# Patient Record
Sex: MALE | Race: WHITE
[De-identification: names, ages, dates, MRNs, and addresses within clinical notes are randomized per-mention and may not be internally consistent; named-entity substitution may affect disease eponyms.]

---

## 2017-09-22 PROBLEM — Z00.00 ENCOUNTER FOR PREVENTIVE HEALTH EXAMINATION: Status: ACTIVE | Noted: 2017-09-22

## 2017-09-27 ENCOUNTER — APPOINTMENT (OUTPATIENT)
Age: 52
End: 2017-09-27

## 2017-09-27 ENCOUNTER — OUTPATIENT (OUTPATIENT)
Dept: OUTPATIENT SERVICES | Facility: HOSPITAL | Age: 52
LOS: 1 days | Discharge: HOME | End: 2017-09-27

## 2017-09-27 VITALS — DIASTOLIC BLOOD PRESSURE: 96 MMHG | SYSTOLIC BLOOD PRESSURE: 158 MMHG

## 2017-09-27 DIAGNOSIS — N17.9 ACUTE KIDNEY FAILURE, UNSPECIFIED: ICD-10-CM

## 2017-09-27 DIAGNOSIS — C90.00 MULTIPLE MYELOMA NOT HAVING ACHIEVED REMISSION: ICD-10-CM

## 2017-09-27 DIAGNOSIS — M54.9 DORSALGIA, UNSPECIFIED: ICD-10-CM

## 2017-09-27 DIAGNOSIS — Z02.9 ENCOUNTER FOR ADMINISTRATIVE EXAMINATIONS, UNSPECIFIED: ICD-10-CM

## 2017-09-27 RX ORDER — LIDOCAINE 5% 700 MG/1
5 PATCH TOPICAL
Qty: 30 | Refills: 0 | Status: ACTIVE | COMMUNITY
Start: 2017-09-27 | End: 1900-01-01

## 2017-10-13 ENCOUNTER — RX RENEWAL (OUTPATIENT)
Age: 52
End: 2017-10-13

## 2017-10-20 ENCOUNTER — RX RENEWAL (OUTPATIENT)
Age: 52
End: 2017-10-20

## 2017-10-20 DIAGNOSIS — G89.3 NEOPLASM RELATED PAIN (ACUTE) (CHRONIC): ICD-10-CM

## 2017-10-20 RX ORDER — OXYCODONE 5 MG/1
5 TABLET ORAL EVERY 4 HOURS
Qty: 30 | Refills: 0 | Status: ACTIVE | COMMUNITY
Start: 2017-10-20 | End: 1900-01-01

## 2017-10-24 ENCOUNTER — INPATIENT (INPATIENT)
Facility: HOSPITAL | Age: 52
LOS: 10 days | Discharge: HOME | End: 2017-11-04
Attending: INTERNAL MEDICINE

## 2017-10-24 DIAGNOSIS — N17.9 ACUTE KIDNEY FAILURE, UNSPECIFIED: ICD-10-CM

## 2017-10-24 DIAGNOSIS — M54.9 DORSALGIA, UNSPECIFIED: ICD-10-CM

## 2017-11-06 DIAGNOSIS — Z87.891 PERSONAL HISTORY OF NICOTINE DEPENDENCE: ICD-10-CM

## 2017-11-06 DIAGNOSIS — D69.59 OTHER SECONDARY THROMBOCYTOPENIA: ICD-10-CM

## 2017-11-06 DIAGNOSIS — D64.9 ANEMIA, UNSPECIFIED: ICD-10-CM

## 2017-11-06 DIAGNOSIS — E87.5 HYPERKALEMIA: ICD-10-CM

## 2017-11-06 DIAGNOSIS — M84.48XA PATHOLOGICAL FRACTURE, OTHER SITE, INITIAL ENCOUNTER FOR FRACTURE: ICD-10-CM

## 2017-11-06 DIAGNOSIS — C90.00 MULTIPLE MYELOMA NOT HAVING ACHIEVED REMISSION: ICD-10-CM

## 2017-11-06 DIAGNOSIS — E83.52 HYPERCALCEMIA: ICD-10-CM

## 2017-11-06 DIAGNOSIS — N18.4 CHRONIC KIDNEY DISEASE, STAGE 4 (SEVERE): ICD-10-CM

## 2017-11-06 DIAGNOSIS — N05.9 UNSPECIFIED NEPHRITIC SYNDROME WITH UNSPECIFIED MORPHOLOGIC CHANGES: ICD-10-CM

## 2017-11-06 DIAGNOSIS — I12.9 HYPERTENSIVE CHRONIC KIDNEY DISEASE WITH STAGE 1 THROUGH STAGE 4 CHRONIC KIDNEY DISEASE, OR UNSPECIFIED CHRONIC KIDNEY DISEASE: ICD-10-CM

## 2017-11-06 DIAGNOSIS — Z51.5 ENCOUNTER FOR PALLIATIVE CARE: ICD-10-CM

## 2017-11-06 DIAGNOSIS — N17.9 ACUTE KIDNEY FAILURE, UNSPECIFIED: ICD-10-CM

## 2017-11-08 ENCOUNTER — RX RENEWAL (OUTPATIENT)
Age: 52
End: 2017-11-08

## 2017-11-08 ENCOUNTER — CLINICAL ADVICE (OUTPATIENT)
Age: 52
End: 2017-11-08

## 2017-11-09 ENCOUNTER — RX RENEWAL (OUTPATIENT)
Age: 52
End: 2017-11-09

## 2017-11-09 RX ORDER — HYDROMORPHONE HYDROCHLORIDE 2 MG/1
2 TABLET ORAL EVERY 6 HOURS
Qty: 120 | Refills: 0 | Status: ACTIVE | COMMUNITY
Start: 2017-11-08 | End: 1900-01-01

## 2017-11-10 ENCOUNTER — RX RENEWAL (OUTPATIENT)
Age: 52
End: 2017-11-10

## 2017-11-15 DIAGNOSIS — M84.48XA PATHOLOGICAL FRACTURE, OTHER SITE, INITIAL ENCOUNTER FOR FRACTURE: ICD-10-CM

## 2017-11-15 DIAGNOSIS — M48.54XA COLLAPSED VERTEBRA, NOT ELSEWHERE CLASSIFIED, THORACIC REGION, INITIAL ENCOUNTER FOR FRACTURE: ICD-10-CM

## 2017-11-15 DIAGNOSIS — R63.4 ABNORMAL WEIGHT LOSS: ICD-10-CM

## 2017-11-15 DIAGNOSIS — M54.9 DORSALGIA, UNSPECIFIED: ICD-10-CM

## 2017-11-28 ENCOUNTER — INPATIENT (INPATIENT)
Facility: HOSPITAL | Age: 52
LOS: 0 days | Discharge: HOME | End: 2017-11-28
Attending: EMERGENCY MEDICINE

## 2017-11-28 DIAGNOSIS — M54.9 DORSALGIA, UNSPECIFIED: ICD-10-CM

## 2017-11-28 DIAGNOSIS — N17.9 ACUTE KIDNEY FAILURE, UNSPECIFIED: ICD-10-CM

## 2017-12-04 DIAGNOSIS — C90.00 MULTIPLE MYELOMA NOT HAVING ACHIEVED REMISSION: ICD-10-CM

## 2017-12-04 DIAGNOSIS — Z92.21 PERSONAL HISTORY OF ANTINEOPLASTIC CHEMOTHERAPY: ICD-10-CM

## 2017-12-04 DIAGNOSIS — G89.29 OTHER CHRONIC PAIN: ICD-10-CM

## 2017-12-04 DIAGNOSIS — D64.9 ANEMIA, UNSPECIFIED: ICD-10-CM

## 2017-12-04 DIAGNOSIS — I10 ESSENTIAL (PRIMARY) HYPERTENSION: ICD-10-CM

## 2017-12-04 DIAGNOSIS — M54.9 DORSALGIA, UNSPECIFIED: ICD-10-CM

## 2017-12-14 ENCOUNTER — INPATIENT (INPATIENT)
Facility: HOSPITAL | Age: 52
LOS: 14 days | Discharge: HOME HEALTH | End: 2017-12-29
Attending: INTERNAL MEDICINE

## 2017-12-14 DIAGNOSIS — N17.9 ACUTE KIDNEY FAILURE, UNSPECIFIED: ICD-10-CM

## 2017-12-14 DIAGNOSIS — M54.9 DORSALGIA, UNSPECIFIED: ICD-10-CM

## 2018-01-02 DIAGNOSIS — K59.00 CONSTIPATION, UNSPECIFIED: ICD-10-CM

## 2018-01-02 DIAGNOSIS — N28.9 DISORDER OF KIDNEY AND URETER, UNSPECIFIED: ICD-10-CM

## 2018-01-02 DIAGNOSIS — R64 CACHEXIA: ICD-10-CM

## 2018-01-02 DIAGNOSIS — D64.9 ANEMIA, UNSPECIFIED: ICD-10-CM

## 2018-01-02 DIAGNOSIS — M48.56XA COLLAPSED VERTEBRA, NOT ELSEWHERE CLASSIFIED, LUMBAR REGION, INITIAL ENCOUNTER FOR FRACTURE: ICD-10-CM

## 2018-01-02 DIAGNOSIS — E87.1 HYPO-OSMOLALITY AND HYPONATREMIA: ICD-10-CM

## 2018-01-02 DIAGNOSIS — G89.3 NEOPLASM RELATED PAIN (ACUTE) (CHRONIC): ICD-10-CM

## 2018-01-02 DIAGNOSIS — N18.3 CHRONIC KIDNEY DISEASE, STAGE 3 (MODERATE): ICD-10-CM

## 2018-01-02 DIAGNOSIS — C90.00 MULTIPLE MYELOMA NOT HAVING ACHIEVED REMISSION: ICD-10-CM

## 2018-01-02 DIAGNOSIS — I45.81 LONG QT SYNDROME: ICD-10-CM

## 2018-01-02 DIAGNOSIS — E87.2 ACIDOSIS: ICD-10-CM

## 2018-01-02 DIAGNOSIS — M62.830 MUSCLE SPASM OF BACK: ICD-10-CM

## 2018-01-02 DIAGNOSIS — C79.00 SECONDARY MALIGNANT NEOPLASM OF UNSPECIFIED KIDNEY AND RENAL PELVIS: ICD-10-CM

## 2018-01-02 DIAGNOSIS — N18.4 CHRONIC KIDNEY DISEASE, STAGE 4 (SEVERE): ICD-10-CM

## 2018-01-02 DIAGNOSIS — E86.0 DEHYDRATION: ICD-10-CM

## 2018-01-02 DIAGNOSIS — T45.1X5A ADVERSE EFFECT OF ANTINEOPLASTIC AND IMMUNOSUPPRESSIVE DRUGS, INITIAL ENCOUNTER: ICD-10-CM

## 2018-01-02 DIAGNOSIS — D64.81 ANEMIA DUE TO ANTINEOPLASTIC CHEMOTHERAPY: ICD-10-CM

## 2018-01-02 DIAGNOSIS — I12.9 HYPERTENSIVE CHRONIC KIDNEY DISEASE WITH STAGE 1 THROUGH STAGE 4 CHRONIC KIDNEY DISEASE, OR UNSPECIFIED CHRONIC KIDNEY DISEASE: ICD-10-CM

## 2018-01-02 DIAGNOSIS — K21.9 GASTRO-ESOPHAGEAL REFLUX DISEASE WITHOUT ESOPHAGITIS: ICD-10-CM

## 2018-01-02 DIAGNOSIS — E83.51 HYPOCALCEMIA: ICD-10-CM

## 2018-01-02 DIAGNOSIS — D63.0 ANEMIA IN NEOPLASTIC DISEASE: ICD-10-CM

## 2018-01-02 DIAGNOSIS — N17.9 ACUTE KIDNEY FAILURE, UNSPECIFIED: ICD-10-CM

## 2018-01-02 DIAGNOSIS — Z51.5 ENCOUNTER FOR PALLIATIVE CARE: ICD-10-CM

## 2018-01-04 DIAGNOSIS — E83.51 HYPOCALCEMIA: ICD-10-CM

## 2018-01-04 DIAGNOSIS — M48.56XA COLLAPSED VERTEBRA, NOT ELSEWHERE CLASSIFIED, LUMBAR REGION, INITIAL ENCOUNTER FOR FRACTURE: ICD-10-CM

## 2018-01-09 ENCOUNTER — OUTPATIENT (OUTPATIENT)
Dept: OUTPATIENT SERVICES | Facility: HOSPITAL | Age: 53
LOS: 1 days | Discharge: HOME | End: 2018-01-09

## 2018-01-09 DIAGNOSIS — C90.02 MULTIPLE MYELOMA IN RELAPSE: ICD-10-CM

## 2018-01-24 ENCOUNTER — INPATIENT (INPATIENT)
Facility: HOSPITAL | Age: 53
LOS: 5 days | Discharge: HOME HEALTH | End: 2018-01-30
Attending: INTERNAL MEDICINE

## 2018-02-02 DIAGNOSIS — N17.9 ACUTE KIDNEY FAILURE, UNSPECIFIED: ICD-10-CM

## 2018-02-02 DIAGNOSIS — G21.9 SECONDARY PARKINSONISM, UNSPECIFIED: ICD-10-CM

## 2018-02-02 DIAGNOSIS — I12.9 HYPERTENSIVE CHRONIC KIDNEY DISEASE WITH STAGE 1 THROUGH STAGE 4 CHRONIC KIDNEY DISEASE, OR UNSPECIFIED CHRONIC KIDNEY DISEASE: ICD-10-CM

## 2018-02-02 DIAGNOSIS — N18.4 CHRONIC KIDNEY DISEASE, STAGE 4 (SEVERE): ICD-10-CM

## 2018-02-02 DIAGNOSIS — M48.54XA COLLAPSED VERTEBRA, NOT ELSEWHERE CLASSIFIED, THORACIC REGION, INITIAL ENCOUNTER FOR FRACTURE: ICD-10-CM

## 2018-02-02 DIAGNOSIS — G89.29 OTHER CHRONIC PAIN: ICD-10-CM

## 2018-02-02 DIAGNOSIS — C90.00 MULTIPLE MYELOMA NOT HAVING ACHIEVED REMISSION: ICD-10-CM

## 2018-02-02 DIAGNOSIS — K21.9 GASTRO-ESOPHAGEAL REFLUX DISEASE WITHOUT ESOPHAGITIS: ICD-10-CM

## 2018-02-02 DIAGNOSIS — M84.48XA PATHOLOGICAL FRACTURE, OTHER SITE, INITIAL ENCOUNTER FOR FRACTURE: ICD-10-CM

## 2018-02-02 DIAGNOSIS — M48.56XD COLLAPSED VERTEBRA, NOT ELSEWHERE CLASSIFIED, LUMBAR REGION, SUBSEQUENT ENCOUNTER FOR FRACTURE WITH ROUTINE HEALING: ICD-10-CM

## 2018-02-02 DIAGNOSIS — D63.8 ANEMIA IN OTHER CHRONIC DISEASES CLASSIFIED ELSEWHERE: ICD-10-CM

## 2018-02-02 DIAGNOSIS — K59.00 CONSTIPATION, UNSPECIFIED: ICD-10-CM

## 2018-02-04 DIAGNOSIS — N17.9 ACUTE KIDNEY FAILURE, UNSPECIFIED: ICD-10-CM

## 2018-02-04 DIAGNOSIS — S22.49XA MULTIPLE FRACTURES OF RIBS, UNSPECIFIED SIDE, INITIAL ENCOUNTER FOR CLOSED FRACTURE: ICD-10-CM

## 2018-02-04 DIAGNOSIS — M54.9 DORSALGIA, UNSPECIFIED: ICD-10-CM

## 2018-02-05 ENCOUNTER — RX RENEWAL (OUTPATIENT)
Age: 53
End: 2018-02-05

## 2018-02-05 RX ORDER — FENTANYL 50 UG/H
50 PATCH, EXTENDED RELEASE TRANSDERMAL
Qty: 10 | Refills: 0 | Status: ACTIVE | COMMUNITY
Start: 2018-02-05 | End: 1900-01-01

## 2018-02-13 DIAGNOSIS — E43 UNSPECIFIED SEVERE PROTEIN-CALORIE MALNUTRITION: ICD-10-CM

## 2018-03-14 ENCOUNTER — EMERGENCY (EMERGENCY)
Facility: HOSPITAL | Age: 53
LOS: 0 days | Discharge: HOME | End: 2018-03-14
Attending: EMERGENCY MEDICINE

## 2018-03-14 VITALS
RESPIRATION RATE: 18 BRPM | DIASTOLIC BLOOD PRESSURE: 63 MMHG | SYSTOLIC BLOOD PRESSURE: 140 MMHG | TEMPERATURE: 96 F | OXYGEN SATURATION: 96 % | HEART RATE: 129 BPM

## 2018-03-14 VITALS
DIASTOLIC BLOOD PRESSURE: 72 MMHG | TEMPERATURE: 97 F | SYSTOLIC BLOOD PRESSURE: 139 MMHG | HEART RATE: 80 BPM | OXYGEN SATURATION: 98 % | RESPIRATION RATE: 18 BRPM

## 2018-03-14 DIAGNOSIS — C90.00 MULTIPLE MYELOMA NOT HAVING ACHIEVED REMISSION: ICD-10-CM

## 2018-03-14 DIAGNOSIS — N18.9 CHRONIC KIDNEY DISEASE, UNSPECIFIED: ICD-10-CM

## 2018-03-14 LAB
ALBUMIN SERPL ELPH-MCNC: 3.6 G/DL — SIGNIFICANT CHANGE UP (ref 3–5.5)
ALP SERPL-CCNC: 50 U/L — SIGNIFICANT CHANGE UP (ref 30–115)
ALT FLD-CCNC: 16 U/L — SIGNIFICANT CHANGE UP (ref 0–41)
ANION GAP SERPL CALC-SCNC: 5 MMOL/L — LOW (ref 7–14)
AST SERPL-CCNC: 15 U/L — SIGNIFICANT CHANGE UP (ref 0–41)
BASOPHILS # BLD AUTO: 0.01 K/UL — SIGNIFICANT CHANGE UP (ref 0–0.2)
BASOPHILS NFR BLD AUTO: 0.2 % — SIGNIFICANT CHANGE UP (ref 0–1)
BILIRUB SERPL-MCNC: 0.6 MG/DL — SIGNIFICANT CHANGE UP (ref 0.2–1.2)
BLD GP AB SCN SERPL QL: SIGNIFICANT CHANGE UP
BUN SERPL-MCNC: 27 MG/DL — HIGH (ref 10–20)
CALCIUM SERPL-MCNC: 8.7 MG/DL — SIGNIFICANT CHANGE UP (ref 8.5–10.1)
CHLORIDE SERPL-SCNC: 109 MMOL/L — SIGNIFICANT CHANGE UP (ref 98–110)
CO2 SERPL-SCNC: 21 MMOL/L — SIGNIFICANT CHANGE UP (ref 17–32)
CREAT SERPL-MCNC: 2.4 MG/DL — HIGH (ref 0.7–1.5)
EOSINOPHIL # BLD AUTO: 0.41 K/UL — SIGNIFICANT CHANGE UP (ref 0–0.7)
EOSINOPHIL NFR BLD AUTO: 7.3 % — SIGNIFICANT CHANGE UP (ref 0–8)
GLUCOSE SERPL-MCNC: 82 MG/DL — SIGNIFICANT CHANGE UP (ref 70–110)
HCT VFR BLD CALC: 21.9 % — LOW (ref 42–52)
HGB BLD-MCNC: 7.3 G/DL — CRITICAL LOW (ref 14–18)
IMM GRANULOCYTES NFR BLD AUTO: 2.5 % — HIGH (ref 0.1–0.3)
LYMPHOCYTES # BLD AUTO: 1.03 K/UL — LOW (ref 1.2–3.4)
LYMPHOCYTES # BLD AUTO: 18.3 % — LOW (ref 20.5–51.1)
MCHC RBC-ENTMCNC: 32.4 PG — HIGH (ref 27–31)
MCHC RBC-ENTMCNC: 33.3 G/DL — SIGNIFICANT CHANGE UP (ref 32–37)
MCV RBC AUTO: 97.3 FL — HIGH (ref 80–94)
MONOCYTES # BLD AUTO: 0.77 K/UL — HIGH (ref 0.1–0.6)
MONOCYTES NFR BLD AUTO: 13.7 % — HIGH (ref 1.7–9.3)
NEUTROPHILS # BLD AUTO: 3.26 K/UL — SIGNIFICANT CHANGE UP (ref 1.4–6.5)
NEUTROPHILS NFR BLD AUTO: 58 % — SIGNIFICANT CHANGE UP (ref 42.2–75.2)
NRBC # BLD: 0 /100 WBCS — SIGNIFICANT CHANGE UP (ref 0–0)
PLATELET # BLD AUTO: 228 K/UL — SIGNIFICANT CHANGE UP (ref 130–400)
POTASSIUM SERPL-MCNC: 4.6 MMOL/L — SIGNIFICANT CHANGE UP (ref 3.5–5)
POTASSIUM SERPL-SCNC: 4.6 MMOL/L — SIGNIFICANT CHANGE UP (ref 3.5–5)
PROT SERPL-MCNC: 6 G/DL — SIGNIFICANT CHANGE UP (ref 6–8)
RBC # BLD: 2.25 M/UL — LOW (ref 4.7–6.1)
RBC # FLD: 14.8 % — HIGH (ref 11.5–14.5)
SODIUM SERPL-SCNC: 135 MMOL/L — SIGNIFICANT CHANGE UP (ref 135–146)
TYPE + AB SCN PNL BLD: SIGNIFICANT CHANGE UP
WBC # BLD: 5.62 K/UL — SIGNIFICANT CHANGE UP (ref 4.8–10.8)
WBC # FLD AUTO: 5.62 K/UL — SIGNIFICANT CHANGE UP (ref 4.8–10.8)

## 2018-03-14 NOTE — ED PROVIDER NOTE - PROGRESS NOTE DETAILS
Attending note:  51 y/o M HX of multiple myeloma gets chemo every Monday with Dr. Valadez, chronic kidney disease, sent in for transfusion. Pt received results today from bloodwork he had done this past Monday with Dr. Valadez showing Hgb of 7.5. Pt states that his baseline Hgb is over 8 and his last transfusion was in January. Pt is otherwise well and denies fever, chills, pain, bloody stools.   On exam: Pt is pale-appearing, nontoxic, S1S2, CTAB, abdomen soft NTND, neuro grossly intact.  Plan: Will transfuse. s/o Dr. Partida

## 2018-03-14 NOTE — ED CDU PROVIDER INITIAL DAY NOTE - CONSTITUTIONAL, MLM
normal... + Pale Apearance. Awake, alert, oriented to person, place, time/situation and in no apparent distress.

## 2018-03-14 NOTE — ED PROVIDER NOTE - OBJECTIVE STATEMENT
51 y/o M, PMHx Multiple Myeloma - currently undergoing chemo weekly (last was on Monday 3/12, & CKD, presents to the ED at the request of his oncologist, Dr. Valadez, as recent bloodwork revealed a Hgb of 7.5. Patient admits to a prior hx of requiring blood transfusions. He denies chest pain, dyspnea, weakness, abdominal pain, nausea, vomiting, and black/bloody stool. He denies any blood thinner use.

## 2018-03-14 NOTE — ED CDU PROVIDER INITIAL DAY NOTE - ATTENDING CONTRIBUTION TO CARE
Pt placed into observation for transfusion of red blood cells. No active bleeding. anemia is secondary to chemo .

## 2018-03-14 NOTE — ED CDU PROVIDER INITIAL DAY NOTE - OBJECTIVE STATEMENT
53 y/o male with PMHX of Multiple Myeloma  on chemo, CKD presenting for blood transfusion. Heme/Onc - Dr Valadez. Pt sent from Dr Valadez's office for one unit of PRBC. Outpatient hgb 7.5, in ED hgb 7.3. Pt was last transfused January 2018. Pt denies any symptoms at this time.

## 2018-03-14 NOTE — ED CDU PROVIDER DISPOSITION NOTE - CLINICAL COURSE
Placed into observation for anemia secondary to chemo requiring transfusion. Pt transfused one unit of prbc and felt better. Pt will follow up with his oncologist this week.

## 2018-03-14 NOTE — ED CDU PROVIDER INITIAL DAY NOTE - MEDICAL DECISION MAKING DETAILS
Pt has a history of Multiple myeloma, under the care of Dr. Valadez, chemo once a week since October 2017, presents with anemia requiring transfusion. No complaints of shortness of breath. Only feels week. On exam S1s2 rrr, lungs clear, abdomen is soft nontender. ext neg for edema.

## 2020-07-21 ENCOUNTER — TRANSCRIPTION ENCOUNTER (OUTPATIENT)
Age: 55
End: 2020-07-21

## 2020-09-25 PROBLEM — C90.00 MULTIPLE MYELOMA NOT HAVING ACHIEVED REMISSION: Chronic | Status: ACTIVE | Noted: 2018-03-14

## 2020-10-19 ENCOUNTER — OUTPATIENT (OUTPATIENT)
Dept: OUTPATIENT SERVICES | Facility: HOSPITAL | Age: 55
LOS: 1 days | Discharge: HOME | End: 2020-10-19

## 2020-10-19 DIAGNOSIS — G56.03 CARPAL TUNNEL SYNDROME, BILATERAL UPPER LIMBS: ICD-10-CM

## 2020-10-19 DIAGNOSIS — M25.649 STIFFNESS OF UNSPECIFIED HAND, NOT ELSEWHERE CLASSIFIED: ICD-10-CM

## 2021-02-15 ENCOUNTER — EMERGENCY (EMERGENCY)
Facility: HOSPITAL | Age: 56
LOS: 0 days | Discharge: HOME | End: 2021-02-15
Attending: EMERGENCY MEDICINE | Admitting: EMERGENCY MEDICINE
Payer: COMMERCIAL

## 2021-02-15 VITALS
TEMPERATURE: 98 F | WEIGHT: 134.92 LBS | OXYGEN SATURATION: 100 % | HEIGHT: 71 IN | HEART RATE: 138 BPM | DIASTOLIC BLOOD PRESSURE: 68 MMHG | RESPIRATION RATE: 17 BRPM | SYSTOLIC BLOOD PRESSURE: 147 MMHG

## 2021-02-15 VITALS
HEART RATE: 98 BPM | OXYGEN SATURATION: 100 % | SYSTOLIC BLOOD PRESSURE: 146 MMHG | DIASTOLIC BLOOD PRESSURE: 81 MMHG | TEMPERATURE: 97 F | RESPIRATION RATE: 20 BRPM

## 2021-02-15 DIAGNOSIS — R79.9 ABNORMAL FINDING OF BLOOD CHEMISTRY, UNSPECIFIED: ICD-10-CM

## 2021-02-15 DIAGNOSIS — R06.02 SHORTNESS OF BREATH: ICD-10-CM

## 2021-02-15 DIAGNOSIS — D64.9 ANEMIA, UNSPECIFIED: ICD-10-CM

## 2021-02-15 LAB
ALBUMIN SERPL ELPH-MCNC: 3.2 G/DL — LOW (ref 3.5–5.2)
ALP SERPL-CCNC: 63 U/L — SIGNIFICANT CHANGE UP (ref 30–115)
ALT FLD-CCNC: 15 U/L — SIGNIFICANT CHANGE UP (ref 0–41)
ANION GAP SERPL CALC-SCNC: 10 MMOL/L — SIGNIFICANT CHANGE UP (ref 7–14)
ANISOCYTOSIS BLD QL: SLIGHT — SIGNIFICANT CHANGE UP
AST SERPL-CCNC: 40 U/L — SIGNIFICANT CHANGE UP (ref 0–41)
BASE EXCESS BLDV CALC-SCNC: -2 MMOL/L — SIGNIFICANT CHANGE UP (ref -2–2)
BASOPHILS # BLD AUTO: 0 K/UL — SIGNIFICANT CHANGE UP (ref 0–0.2)
BASOPHILS NFR BLD AUTO: 0 % — SIGNIFICANT CHANGE UP (ref 0–1)
BILIRUB SERPL-MCNC: 0.3 MG/DL — SIGNIFICANT CHANGE UP (ref 0.2–1.2)
BLD GP AB SCN SERPL QL: SIGNIFICANT CHANGE UP
BUN SERPL-MCNC: 19 MG/DL — SIGNIFICANT CHANGE UP (ref 10–20)
CA-I SERPL-SCNC: 1.15 MMOL/L — SIGNIFICANT CHANGE UP (ref 1.12–1.3)
CALCIUM SERPL-MCNC: 8.2 MG/DL — LOW (ref 8.5–10.1)
CHLORIDE SERPL-SCNC: 101 MMOL/L — SIGNIFICANT CHANGE UP (ref 98–110)
CO2 SERPL-SCNC: 20 MMOL/L — SIGNIFICANT CHANGE UP (ref 17–32)
CREAT SERPL-MCNC: 2 MG/DL — HIGH (ref 0.7–1.5)
DACRYOCYTES BLD QL SMEAR: SLIGHT — SIGNIFICANT CHANGE UP
EOSINOPHIL # BLD AUTO: 0.2 K/UL — SIGNIFICANT CHANGE UP (ref 0–0.7)
EOSINOPHIL NFR BLD AUTO: 3.6 % — SIGNIFICANT CHANGE UP (ref 0–8)
GAS PNL BLDV: 136 MMOL/L — SIGNIFICANT CHANGE UP (ref 136–145)
GAS PNL BLDV: SIGNIFICANT CHANGE UP
GIANT PLATELETS BLD QL SMEAR: PRESENT — SIGNIFICANT CHANGE UP
GLUCOSE SERPL-MCNC: 118 MG/DL — HIGH (ref 70–99)
HCO3 BLDV-SCNC: 23 MMOL/L — SIGNIFICANT CHANGE UP (ref 22–29)
HCT VFR BLD CALC: 20.7 % — LOW (ref 42–52)
HCT VFR BLDA CALC: 17.9 % — LOW (ref 34–44)
HGB BLD CALC-MCNC: 5.8 G/DL — LOW (ref 14–18)
HGB BLD-MCNC: 6.9 G/DL — CRITICAL LOW (ref 14–18)
LACTATE BLDV-MCNC: 0.5 MMOL/L — SIGNIFICANT CHANGE UP (ref 0.5–1.6)
LYMPHOCYTES # BLD AUTO: 1.72 K/UL — SIGNIFICANT CHANGE UP (ref 1.2–3.4)
LYMPHOCYTES # BLD AUTO: 30.3 % — SIGNIFICANT CHANGE UP (ref 20.5–51.1)
MANUAL SMEAR VERIFICATION: SIGNIFICANT CHANGE UP
MCHC RBC-ENTMCNC: 31.9 PG — HIGH (ref 27–31)
MCHC RBC-ENTMCNC: 33.3 G/DL — SIGNIFICANT CHANGE UP (ref 32–37)
MCV RBC AUTO: 95.8 FL — HIGH (ref 80–94)
METAMYELOCYTES # FLD: 3.6 % — HIGH (ref 0–0)
MICROCYTES BLD QL: SLIGHT — SIGNIFICANT CHANGE UP
MONOCYTES # BLD AUTO: 0.35 K/UL — SIGNIFICANT CHANGE UP (ref 0.1–0.6)
MONOCYTES NFR BLD AUTO: 6.2 % — SIGNIFICANT CHANGE UP (ref 1.7–9.3)
MYELOCYTES NFR BLD: 1.8 % — HIGH (ref 0–0)
NEUTROPHILS # BLD AUTO: 3.05 K/UL — SIGNIFICANT CHANGE UP (ref 1.4–6.5)
NEUTROPHILS NFR BLD AUTO: 52.7 % — SIGNIFICANT CHANGE UP (ref 42.2–75.2)
NEUTS BAND # BLD: 0.9 % — SIGNIFICANT CHANGE UP (ref 0–6)
NRBC # BLD: 1 /100 — HIGH (ref 0–0)
NRBC # BLD: SIGNIFICANT CHANGE UP /100 WBCS (ref 0–0)
PCO2 BLDV: 36 MMHG — LOW (ref 41–51)
PH BLDV: 7.4 — SIGNIFICANT CHANGE UP (ref 7.26–7.43)
PLAT MORPH BLD: ABNORMAL
PLATELET # BLD AUTO: 372 K/UL — SIGNIFICANT CHANGE UP (ref 130–400)
PO2 BLDV: 25 MMHG — SIGNIFICANT CHANGE UP (ref 20–40)
POIKILOCYTOSIS BLD QL AUTO: SLIGHT — SIGNIFICANT CHANGE UP
POLYCHROMASIA BLD QL SMEAR: SLIGHT — SIGNIFICANT CHANGE UP
POTASSIUM BLDV-SCNC: 4.2 MMOL/L — SIGNIFICANT CHANGE UP (ref 3.3–5.6)
POTASSIUM SERPL-MCNC: 6.9 MMOL/L — CRITICAL HIGH (ref 3.5–5)
POTASSIUM SERPL-SCNC: 6.9 MMOL/L — CRITICAL HIGH (ref 3.5–5)
PROT SERPL-MCNC: 7.2 G/DL — SIGNIFICANT CHANGE UP (ref 6–8)
RBC # BLD: 2.16 M/UL — LOW (ref 4.7–6.1)
RBC # FLD: 15.2 % — HIGH (ref 11.5–14.5)
RBC BLD AUTO: ABNORMAL
SAO2 % BLDV: 47 % — SIGNIFICANT CHANGE UP
SODIUM SERPL-SCNC: 131 MMOL/L — LOW (ref 135–146)
TOXIC GRANULES BLD QL SMEAR: PRESENT — SIGNIFICANT CHANGE UP
VARIANT LYMPHS # BLD: 0.9 % — SIGNIFICANT CHANGE UP (ref 0–5)
WBC # BLD: 5.69 K/UL — SIGNIFICANT CHANGE UP (ref 4.8–10.8)
WBC # FLD AUTO: 5.69 K/UL — SIGNIFICANT CHANGE UP (ref 4.8–10.8)

## 2021-02-15 PROCEDURE — 99218: CPT

## 2021-02-15 PROCEDURE — 93010 ELECTROCARDIOGRAM REPORT: CPT

## 2021-02-15 NOTE — ED PROVIDER NOTE - CLINICAL SUMMARY MEDICAL DECISION MAKING FREE TEXT BOX
Patient presented for low hb as outpatient. Otherwise on arrival afebrile, HD stable. Obtained labs which showed Hb to 6.9 but otherwise grossly unremarkable including no significant leukocytosis, signs of dehydration/MAC, transaminitis or significant electrolyte abnormalities. Consented for blood transfusion. Consulted Dr. Valadez from heme/onc who agrees with 1U PRBC transfusion and discharge with outpatient follow up. Will place in obs for transfusion and monitoring. Patient agreeable with plan.

## 2021-02-15 NOTE — ED PROVIDER NOTE - NS ED ROS FT
Review of Systems:  	•	CONSTITUTIONAL: no fever, no diaphoresis, no chills  	•	SKIN: no rash  	•	HEMATOLOGIC: no bleeding  	•	ENT: no sore throat  	•	RESPIRATORY: +shortness of breath, no cough  	•	CARDIAC: no chest pain, no palpitations  	•	GI: no abd pain, no nausea, no vomiting, no diarrhea  	•	GENITO-URINARY: no discharge, no dysuria; no hematuria, no increased urinary frequency  	•	MUSCULOSKELETAL: no joint paint, no swelling, no redness  	•	NEUROLOGIC: no weakness, no headache, no paresthesias

## 2021-02-15 NOTE — ED CDU PROVIDER INITIAL DAY NOTE - ATTENDING CONTRIBUTION TO CARE
56 y/o m w/ pmhx of CKD, mulitple myeloma s/p chemo ~ 3 times a month, requires blood transfusions at times presents for blood transfusion. symptoms of shortness of breath, worse with exertion. last transfusion 2018. No fever, chills, n/v, cp,  pleuritic cp, palpitations, diaphoresis, cough, ha/lh/dizziness, numbness/tingling, neck pain/ stiffness, abd pain, diarrhea, constipation, melena/brbpr, urinary symptoms, trauma, edema, calf pain/swelling/erythema, sick contacts, recent travel or rash.    Vital Signs: I have reviewed the initial vital signs. Constitutional: Male pt sitting on stretcher speaking full sentences. EYES: (+) Pale conjunctivae. Integumentary: No rash. (+) Pallor. ENT: MMM NECK: Supple, non-tender, no meningeal signs. Cardiovascular: RRR, radial pulses 2/4 b/l. No JVD. Respiratory: BS present b/l, ctabl, no wheezing or crackles, no accessory muscle use, no stridor. Gastrointestinal: BS present throughout all 4 quadrants, soft, nd, nt, no rebound tenderness or guarding, no cvat. Musculoskeletal: FROM, no edema, no calf pain/swelling/erythema. Neurologic: AAOx3, motor 5/5 and sensation intact throughout upper and lowe ext, CN II-XII intact, No facial droop or slurring of speech. No focal deficits.    Hgb in ed 6.9, consented for blood, witnessed, given to  to scan, k 6.9 hemolyzed, on vbg 4.2, cre baseline at 2.0, previous 2.4 in 2018, pt aware of plan for transfusion, will continue to monitor and reassess.

## 2021-02-15 NOTE — ED CDU PROVIDER DISPOSITION NOTE - NSFOLLOWUPINSTRUCTIONS_ED_ALL_ED_FT
Anemia  Anemia is a condition in which you do not have enough red blood cells or hemoglobin. Hemoglobin is a substance in red blood cells that carries oxygen. When you do not have enough red blood cells or hemoglobin (are anemic), your body cannot get enough oxygen and your organs may not work properly. As a result, you may feel very tired or have other problems.    What are the causes?  Common causes of anemia include:  Excessive bleeding. Anemia can be caused by excessive bleeding inside or outside the body, including bleeding from the intestine or from periods in women.  Poor nutrition.  Long-lasting (chronic) kidney, thyroid, and liver disease.  Bone marrow disorders.  Cancer and treatments for cancer.  HIV (human immunodeficiency virus) and AIDS (acquired immunodeficiency syndrome).  Treatments for HIV and AIDS.  Spleen problems.  Blood disorders.  Infections, medicines, and autoimmune disorders that destroy red blood cells.  What are the signs or symptoms?  Symptoms of this condition include:  Minor weakness.  Dizziness.  Headache.  Feeling heartbeats that are irregular or faster than normal (palpitations).  Shortness of breath, especially with exercise.  Paleness.  Cold sensitivity.  Indigestion.  Nausea.  Difficulty sleeping.  Difficulty concentrating.  Symptoms may occur suddenly or develop slowly. If your anemia is mild, you may not have symptoms.    How is this diagnosed?  This condition is diagnosed based on:  Blood tests.  Your medical history.  A physical exam.  Bone marrow biopsy.  Your health care provider may also check your stool (feces) for blood and may do additional testing to look for the cause of your bleeding.    You may also have other tests, including:  Imaging tests, such as a CT scan or MRI.  Endoscopy.  Colonoscopy.  How is this treated?  Treatment for this condition depends on the cause. If you continue to lose a lot of blood, you may need to be treated at a hospital. Treatment may include:  Taking supplements of iron, vitamin B12, or folic acid.  Taking a hormone medicine (erythropoietin) that can help to stimulate red blood cell growth.  Having a blood transfusion. This may be needed if you lose a lot of blood.  Making changes to your diet.  Having surgery to remove your spleen.  Follow these instructions at home:  Take over-the-counter and prescription medicines only as told by your health care provider.  Take supplements only as told by your health care provider.  Follow any diet instructions that you were given.  Keep all follow-up visits as told by your health care provider. This is important.  Contact a health care provider if:  You develop new bleeding anywhere in the body.  Get help right away if:  You are very weak.  You are short of breath.  You have pain in your abdomen or chest.  You are dizzy or feel faint.  You have trouble concentrating.  You have bloody or black, tarry stools.  You vomit repeatedly or you vomit up blood.  Summary  Anemia is a condition in which you do not have enough red blood cells or enough of a substance in your red blood cells that carries oxygen (hemoglobin).  Symptoms may occur suddenly or develop slowly.  If your anemia is mild, you may not have symptoms.  This condition is diagnosed with blood tests as well as a medical history and physical exam. Other tests may be needed.  Treatment for this condition depends on the cause of the anemia.  This information is not intended to replace advice given to you by your health care provider. Make sure you discuss any questions you have with your health care provider.

## 2021-02-15 NOTE — ED CDU PROVIDER DISPOSITION NOTE - CARE PROVIDER_API CALL
Bela [facility]  HEMATOLOGY  [id number]  [place] [address]  Phone: [phone]  Fax: [phone]  Follow Up Time:

## 2021-02-15 NOTE — ED CDU PROVIDER INITIAL DAY NOTE - OBJECTIVE STATEMENT
Pt is a 56y/o male with a pmhx of Multiple Myeloma presents today for generalized weakness, anemia, requires frequent transfusions. Pt follwoed by Dr. Valadez and has upcoming chemo in 3 days. Pt denies CP, N/V/D, fever, chills.

## 2021-02-15 NOTE — ED CDU PROVIDER DISPOSITION NOTE - ATTENDING CONTRIBUTION TO CARE
54 y/o m w/ pmhx of CKD, mulitple myeloma s/p chemo ~ 3 times a month, requires blood transfusions at times presents for blood transfusion. symptoms of shortness of breath, worse with exertion. last transfusion 2018. No fever, chills, n/v, cp,  pleuritic cp, palpitations, diaphoresis, cough, ha/lh/dizziness, numbness/tingling, neck pain/ stiffness, abd pain, diarrhea, constipation, melena/brbpr, urinary symptoms, trauma, edema, calf pain/swelling/erythema, sick contacts, recent travel or rash.    Vital Signs: I have reviewed the initial vital signs. Constitutional: Male pt sitting on stretcher speaking full sentences. EYES: (+) Pale conjunctivae. Integumentary: No rash. (+) Pallor. ENT: MMM NECK: Supple, non-tender, no meningeal signs. Cardiovascular: RRR, radial pulses 2/4 b/l. No JVD. Respiratory: BS present b/l, ctabl, no wheezing or crackles, no accessory muscle use, no stridor. Gastrointestinal: BS present throughout all 4 quadrants, soft, nd, nt, no rebound tenderness or guarding, no cvat. Musculoskeletal: FROM, no edema, no calf pain/swelling/erythema. Neurologic: AAOx3, motor 5/5 and sensation intact throughout upper and lowe ext, CN II-XII intact, No facial droop or slurring of speech. No focal deficits.    Hgb in ed 6.9, consented for blood, witnessed, given to  to scan, k 6.9 hemolyzed, on vbg 4.2, cre baseline at 2.0, previous 2.4 in 2018, received transfusion, feeling better, eager to leave, no symptoms, aware of signs and symptoms to return for, will follow up with his heme onc.

## 2021-02-15 NOTE — ED CDU PROVIDER DISPOSITION NOTE - CLINICAL COURSE
Hgb in ed 6.9, consented for blood, witnessed, given to  to scan, k 6.9 hemolyzed, on vbg 4.2, cre baseline at 2.0, previous 2.4 in 2018, received transfusion, feeling better, eager to leave, no symptoms, aware of signs and symptoms to return for, will follow up with his heme onc.

## 2021-02-15 NOTE — ED PROVIDER NOTE - PHYSICAL EXAMINATION
CONSTITUTIONAL: Well-developed; well-nourished; in no acute distress, nontoxic appearing  SKIN: skin exam is warm and dry,  HEAD: Normocephalic; atraumatic.  EYES: PERRL, 3 mm bilateral, no nystagmus, EOM intact; conjunctiva and sclera clear.  ENT: MMM  NECK: ROM intact.  CARD: S1, S2 normal, no murmur  RESP: No wheezes, rales or rhonchi. Good air movement bilaterally  ABD: soft; non-distended; non-tender.   EXT: Normal ROM.   NEURO: awake, alert, following commands, oriented, grossly unremarkable. No Focal deficits. GCS 15.   PSYCH: Cooperative, appropriate.

## 2021-02-15 NOTE — ED PROVIDER NOTE - ATTENDING CONTRIBUTION TO CARE
55 year old male, pmhx as documented, presenting for blood transfusion for low hb as outpatient. Patient follows with Dr. Valadez for heme/onc and receives chemo 3x/month. States he has had dyspnea on exertion but states this is typical of his prior times requiring a transfusion. Otherwise denies fevers, cough, pain or active bleeding.    Vital Signs: I have reviewed the initial vital signs.  Constitutional: NAD, well-nourished, appears stated age, no acute distress.  HEENT: Airway patent, moist MM, no erythema/swelling/deformity of oral structures. EOMI, PERRLA.  CV: regular rate, regular rhythm, well-perfused extremities, 2+ b/l DP and radial pulses equal.  Lungs: BCTA, no increased WOB.  ABD: NTND, no guarding or rebound, no pulsatile mass, no hernias.   MSK: Neck supple, nontender, nl ROM, no stepoff. Chest nontender. Back nontender in TLS spine or to b/l bony structures or flanks. Ext nontender, nl rom, no deformity.   INTEG: Skin warm, dry, no rash.  NEURO: A&Ox3, normal strength, nl sensation throughout, normal speech.   PSYCH: Calm, cooperative, normal affect and interaction.    No active bleeding on exam. WIll obtain labs, T+S, consent for blood transfusion, re-eval.

## 2021-02-15 NOTE — ED CDU PROVIDER DISPOSITION NOTE - PATIENT PORTAL LINK FT
You can access the FollowMyHealth Patient Portal offered by [facility] by registering at the following website: [email or web address]. By joining [employer]’s FollowMyHealth portal, you will also be able to view your health information using other applications (apps) compatible with our system.

## 2021-02-15 NOTE — ED PROVIDER NOTE - PROGRESS NOTE DETAILS
CK: spoke with Dr Allyson oliveira/onc, aware of patient, agree with plan for 1 unit. will place in obs.

## 2021-02-15 NOTE — ED PROVIDER NOTE - OBJECTIVE STATEMENT
55 year old male, past medical history MM, who presents for blood transfusion. patient follows with heme/onc, dr montgomery, receives chemo 3x/month, due this thursday. patient reports gradual worsening of shortness of breath, worse with exertion. last transfusion 2018. no f/c, URI symptoms, cough, back pain, CP, abd pain, n/v/d, urinary symptoms, skin changes, bloody stools/dark stools.

## 2021-02-15 NOTE — ED CDU PROVIDER INITIAL DAY NOTE - MEDICAL DECISION MAKING DETAILS
Hgb in ed 6.9, consented for blood, witnessed, given to  to scan, k 6.9 hemolyzed, on vbg 4.2, cre baseline at 2.0, previous 2.4 in 2018, pt aware of plan for transfusion, will continue to monitor and reassess.

## 2021-02-15 NOTE — ED CDU PROVIDER INITIAL DAY NOTE - PHYSICAL EXAMINATION
VITAL SIGNS: I have reviewed nursing notes and confirm.  CONSTITUTIONAL: cachectic, chronically ill-appearing  SKIN: pale  HEAD: Normocephalic; atraumatic.  EYES: conjunctiva and sclera clear.  ENT: No nasal discharge; airway clear.  CARD: S1, S2 normal; no murmurs, gallops, or rubs. Regular rate and rhythm.   RESP: No wheezes, rales or rhonchi.  ABD: Normal bowel sounds; soft; non-distended; non-tender  EXT: Normal ROM.  No clubbing, cyanosis or edema.   NEURO: Alert, oriented, grossly unremarkable

## 2021-02-15 NOTE — ED ADULT NURSE NOTE - OBJECTIVE STATEMENT
patient presents to the ed with complaints of low hgb since january. patient states gets sob when walks but denies other discomfort

## 2021-05-19 ENCOUNTER — EMERGENCY (EMERGENCY)
Facility: HOSPITAL | Age: 56
LOS: 0 days | Discharge: LEFT BEFORE DISCHARGE | End: 2021-05-19
Attending: STUDENT IN AN ORGANIZED HEALTH CARE EDUCATION/TRAINING PROGRAM | Admitting: STUDENT IN AN ORGANIZED HEALTH CARE EDUCATION/TRAINING PROGRAM
Payer: COMMERCIAL

## 2021-05-19 VITALS
WEIGHT: 130.07 LBS | OXYGEN SATURATION: 99 % | SYSTOLIC BLOOD PRESSURE: 121 MMHG | DIASTOLIC BLOOD PRESSURE: 57 MMHG | HEART RATE: 100 BPM | HEIGHT: 71 IN | RESPIRATION RATE: 18 BRPM | TEMPERATURE: 98 F

## 2021-05-19 DIAGNOSIS — D64.9 ANEMIA, UNSPECIFIED: ICD-10-CM

## 2021-05-19 DIAGNOSIS — R53.83 OTHER FATIGUE: ICD-10-CM

## 2021-05-19 DIAGNOSIS — C90.00 MULTIPLE MYELOMA NOT HAVING ACHIEVED REMISSION: ICD-10-CM

## 2021-05-19 DIAGNOSIS — R06.02 SHORTNESS OF BREATH: ICD-10-CM

## 2021-05-19 LAB
ALBUMIN SERPL ELPH-MCNC: 3.1 G/DL — LOW (ref 3.5–5.2)
ALP SERPL-CCNC: 82 U/L — SIGNIFICANT CHANGE UP (ref 30–115)
ALT FLD-CCNC: 42 U/L — HIGH (ref 0–41)
ANION GAP SERPL CALC-SCNC: 12 MMOL/L — SIGNIFICANT CHANGE UP (ref 7–14)
AST SERPL-CCNC: 30 U/L — SIGNIFICANT CHANGE UP (ref 0–41)
BILIRUB SERPL-MCNC: <0.2 MG/DL — SIGNIFICANT CHANGE UP (ref 0.2–1.2)
BLD GP AB SCN SERPL QL: SIGNIFICANT CHANGE UP
BUN SERPL-MCNC: 38 MG/DL — HIGH (ref 10–20)
CALCIUM SERPL-MCNC: 8.8 MG/DL — SIGNIFICANT CHANGE UP (ref 8.5–10.1)
CHLORIDE SERPL-SCNC: 102 MMOL/L — SIGNIFICANT CHANGE UP (ref 98–110)
CO2 SERPL-SCNC: 21 MMOL/L — SIGNIFICANT CHANGE UP (ref 17–32)
CREAT SERPL-MCNC: 2.1 MG/DL — HIGH (ref 0.7–1.5)
DIR ANTIGLOB POLYSPECIFIC INTERPRETATION: SIGNIFICANT CHANGE UP
GLUCOSE SERPL-MCNC: 94 MG/DL — SIGNIFICANT CHANGE UP (ref 70–99)
HCT VFR BLD CALC: 22.6 % — LOW (ref 42–52)
HGB BLD-MCNC: 7.3 G/DL — LOW (ref 14–18)
MCHC RBC-ENTMCNC: 31.6 PG — HIGH (ref 27–31)
MCHC RBC-ENTMCNC: 32.3 G/DL — SIGNIFICANT CHANGE UP (ref 32–37)
MCV RBC AUTO: 97.8 FL — HIGH (ref 80–94)
NRBC # BLD: 0 /100 WBCS — SIGNIFICANT CHANGE UP (ref 0–0)
PLATELET # BLD AUTO: 238 K/UL — SIGNIFICANT CHANGE UP (ref 130–400)
POTASSIUM SERPL-MCNC: 5.1 MMOL/L — HIGH (ref 3.5–5)
POTASSIUM SERPL-SCNC: 5.1 MMOL/L — HIGH (ref 3.5–5)
PROT SERPL-MCNC: 5.9 G/DL — LOW (ref 6–8)
RBC # BLD: 2.31 M/UL — LOW (ref 4.7–6.1)
RBC # FLD: 15.7 % — HIGH (ref 11.5–14.5)
SODIUM SERPL-SCNC: 135 MMOL/L — SIGNIFICANT CHANGE UP (ref 135–146)
WBC # BLD: 4.28 K/UL — LOW (ref 4.8–10.8)
WBC # FLD AUTO: 4.28 K/UL — LOW (ref 4.8–10.8)

## 2021-05-19 PROCEDURE — 99284 EMERGENCY DEPT VISIT MOD MDM: CPT

## 2021-05-19 NOTE — ED PROVIDER NOTE - NS ED ROS FT
CONST: No fever, chills or bodyaches  EYES: No pain, redness, drainage or visual changes.  ENT: No ear pain or discharge, nasal discharge or congestion. No sore throat  CARD: No chest pain, palpitations  RESP: No SOB, cough  GI: No abdominal pain, N/V/D  MS: No joint pain, back pain or extremity pain/injury  SKIN: No rashes  NEURO: No headache, dizziness, paresthesias or LOC

## 2021-05-19 NOTE — ED PROVIDER NOTE - OBJECTIVE STATEMENT
56yo male with PMHx Multiple Myeloma, receiving chemo 3x a month with last chemo 2 days prior, follows with heme/onc Dr. Valadez, sent by office for Hgb 7.5 on outpatient labs done yesterday. Dr. Valadez requesting 2 units prbcs and discharge. Pt reports fatigue, mild SOB, typical of when his Hgb is low. He reports baseline around 8.5-9. He denies syncope, CP, blood in stool, dizziness. No recent illness.

## 2021-05-19 NOTE — ED ADULT NURSE NOTE - OBJECTIVE STATEMENT
Patient states he was sent in by his PMD for low hemoglobin. Patient states he has hx of multiple myeloma, denies any other symptoms.

## 2021-05-19 NOTE — ED ADULT NURSE NOTE - NSSEPSISSUSPECTED_ED_A_ED
Initial Anesthesia Post-op Note    Patient: Alberta Gross  Procedure(s) Performed: LAPAROSCOPIC APPENDECTOMY  Anesthesia type: Anesthesia type not filed in the log.    Vital Last Value   Temperature (!) 38.7 °C (101.6 °F) (08/05/18 2025)   Pulse 101 (08/05/18 2025)   Respiratory Rate 20 (08/05/18 2025)   Non-Invasive   Blood Pressure 141/73 (08/05/18 2025)   Arterial  Blood Pressure     Pulse Oximetry 96 % (08/05/18 2025)     Last 24 I/O:   Intake/Output Summary (Last 24 hours) at 08/05/18 2041  Last data filed at 08/05/18 2015   Gross per 24 hour   Intake                0 ml   Output               50 ml   Net              -50 ml       PATIENT LOCATION: PACU Phase 1  LEVEL OF CONSCIOUSNESS: awake, alert and oriented  RESPIRATORY STATUS: spontaneous ventilation and face mask  CARDIOVASCULAR: blood pressure returned to baseline  HYDRATION: euvolemic    PAIN MANAGEMENT: adequately controlled  NAUSEA: None  AIRWAY PATENCY: patent  POST-OP ASSESSMENT: no complications, patient tolerated procedure well with no complications, no evidence of recall and sufficiently recovered from acute administration of anesthesia effects and able to participate in evaluation  COMPLICATIONS: none  HANDOFF:  Handoff to receiving nurse was performed and questions were answered       No

## 2021-05-19 NOTE — ED PROVIDER NOTE - PATIENT PORTAL LINK FT
You can access the FollowMyHealth Patient Portal offered by [facility] by registering at the following website: [email or web address]. By joining [employer]’s FollowMyHealth portal, you will also be able to view your health information using other applications (apps) compatible with our system.

## 2021-05-19 NOTE — ED PROVIDER NOTE - PHYSICAL EXAMINATION
CONST: Chronically ill-appearing  EYES: PERRL, EOMI, Sclera and pale conjunctiva  CARD: Normal S1 S2; Normal rate and rhythm  RESP: Equal BS B/L, No wheezes, rhonchi or rales. No distress  GI: Soft, non-tender, non-distended.  MS: Normal ROM in all extremities. No midline spinal tenderness.  SKIN: Warm, dry, no acute rashes. Good turgor  NEURO: A&Ox3, No focal deficits. Strength 5/5 with no sensory deficits. Steady gait

## 2021-05-19 NOTE — ED PROVIDER NOTE - PROGRESS NOTE DETAILS
ATTENDING NOTE: 54 y/o M PMH multiple myeloma, followed by Dr. Valadez presents to the ED for 2 units. Pt’s hemoglobin was found to be 7.5 so sent to the ED. Pt only complains of dyspnea on exertion and notes that this is a normal symptom for when he has low hemoglobin. Pt has no fevers, CP, n/v/d, black or bloody stools. CONSTITUTIONAL: WA / WN / NAD. HEAD: NCAT. EYES: PERRL; EOMI; anicteric. ENT: Normal pharynx; mucous membranes pink/moist, no erythema. NECK: Supple; no meningeal signs CARD: RRR; nl S1/S2; no M/R/G. Pulses equal bilaterally. RESP: Respiratory rate and effort are normal; breath sounds clear and equal bilaterally. ABD: Soft, NT ND nl bowel sounds; no masses; no rebound. MSK/EXT: No gross deformities; full range of motion. SKIN: Warm and dry; NEURO: AAOx3, PSYCH: Memory Intact, Normal Affect. Received call from blood bank, pt has antibodies and blood will take longer to be ready. 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 This was explained to pt and he is agitated as he has an appt in [place] at 7pm that he does not want to miss Blood coming from [place] blood bank either later this evening or more likely tomorrow morning. Pt not willing to wait. This was also discussed with his heme/onc Dr. Valadez who stated she is ok with patient leaving to attend [facility] and coming back in the AM. Pt will choose to leave and return in the AM. [facility] bank was notified of this circumstance and said to call in the AM upon patient's re-arrival to ensure blood will be ready. The patient wishes to leave against medical advice.  I have discussed the risks, benefits and alternatives (including the possibility of worsening of disease, pain, permanent disability, and/or death) with the patient and his/her family (if available).  The patient voices understanding of these risks, benefits, and alternatives and still wishes to sign out against medical advice.  The patient is awake, alert, oriented  x 3 and has demonstrated capacity to refuse/direct care.  I have advised the patient that they can and should return immediately should they develop any worse/different/additional symptoms, or if they change their mind and want to continue their care.

## 2021-05-19 NOTE — ED PROVIDER NOTE - CARE PLAN
Principal Discharge DX:	Anemia  Secondary Diagnosis:	Multiple myeloma, remission status unspecified

## 2021-05-20 ENCOUNTER — EMERGENCY (EMERGENCY)
Facility: HOSPITAL | Age: 56
LOS: 0 days | Discharge: HOME | End: 2021-05-20
Attending: STUDENT IN AN ORGANIZED HEALTH CARE EDUCATION/TRAINING PROGRAM | Admitting: STUDENT IN AN ORGANIZED HEALTH CARE EDUCATION/TRAINING PROGRAM
Payer: COMMERCIAL

## 2021-05-20 VITALS
WEIGHT: 132.94 LBS | DIASTOLIC BLOOD PRESSURE: 59 MMHG | RESPIRATION RATE: 20 BRPM | HEIGHT: 71 IN | OXYGEN SATURATION: 98 % | HEART RATE: 112 BPM | SYSTOLIC BLOOD PRESSURE: 117 MMHG | TEMPERATURE: 99 F

## 2021-05-20 VITALS
DIASTOLIC BLOOD PRESSURE: 61 MMHG | OXYGEN SATURATION: 100 % | HEART RATE: 74 BPM | RESPIRATION RATE: 16 BRPM | SYSTOLIC BLOOD PRESSURE: 122 MMHG

## 2021-05-20 DIAGNOSIS — D64.9 ANEMIA, UNSPECIFIED: ICD-10-CM

## 2021-05-20 DIAGNOSIS — C90.00 MULTIPLE MYELOMA NOT HAVING ACHIEVED REMISSION: ICD-10-CM

## 2021-05-20 LAB
ALBUMIN SERPL ELPH-MCNC: 3.1 G/DL — LOW (ref 3.5–5.2)
ALP SERPL-CCNC: 68 U/L — SIGNIFICANT CHANGE UP (ref 30–115)
ALT FLD-CCNC: 33 U/L — SIGNIFICANT CHANGE UP (ref 0–41)
ANION GAP SERPL CALC-SCNC: 13 MMOL/L — SIGNIFICANT CHANGE UP (ref 7–14)
ANISOCYTOSIS BLD QL: SLIGHT — SIGNIFICANT CHANGE UP
APTT BLD: 27.3 SEC — SIGNIFICANT CHANGE UP (ref 27–39.2)
AST SERPL-CCNC: 19 U/L — SIGNIFICANT CHANGE UP (ref 0–41)
BASOPHILS # BLD AUTO: 0 K/UL — SIGNIFICANT CHANGE UP (ref 0–0.2)
BASOPHILS NFR BLD AUTO: 0 % — SIGNIFICANT CHANGE UP (ref 0–1)
BILIRUB SERPL-MCNC: <0.2 MG/DL — SIGNIFICANT CHANGE UP (ref 0.2–1.2)
BLD GP AB SCN SERPL QL: SIGNIFICANT CHANGE UP
BUN SERPL-MCNC: 34 MG/DL — HIGH (ref 10–20)
CALCIUM SERPL-MCNC: 9.1 MG/DL — SIGNIFICANT CHANGE UP (ref 8.5–10.1)
CHLORIDE SERPL-SCNC: 102 MMOL/L — SIGNIFICANT CHANGE UP (ref 98–110)
CO2 SERPL-SCNC: 19 MMOL/L — SIGNIFICANT CHANGE UP (ref 17–32)
CREAT SERPL-MCNC: 1.9 MG/DL — HIGH (ref 0.7–1.5)
DACRYOCYTES BLD QL SMEAR: SLIGHT — SIGNIFICANT CHANGE UP
ELLIPTOCYTES BLD QL SMEAR: SLIGHT — SIGNIFICANT CHANGE UP
EOSINOPHIL # BLD AUTO: 0.79 K/UL — HIGH (ref 0–0.7)
EOSINOPHIL NFR BLD AUTO: 19.3 % — HIGH (ref 0–8)
GIANT PLATELETS BLD QL SMEAR: PRESENT — SIGNIFICANT CHANGE UP
GLUCOSE SERPL-MCNC: 101 MG/DL — HIGH (ref 70–99)
HCT VFR BLD CALC: 22.7 % — LOW (ref 42–52)
HGB BLD-MCNC: 7.2 G/DL — LOW (ref 14–18)
INR BLD: 0.97 RATIO — SIGNIFICANT CHANGE UP (ref 0.65–1.3)
LYMPHOCYTES # BLD AUTO: 1.01 K/UL — LOW (ref 1.2–3.4)
LYMPHOCYTES # BLD AUTO: 24.6 % — SIGNIFICANT CHANGE UP (ref 20.5–51.1)
MANUAL SMEAR VERIFICATION: SIGNIFICANT CHANGE UP
MCHC RBC-ENTMCNC: 31.2 PG — HIGH (ref 27–31)
MCHC RBC-ENTMCNC: 31.7 G/DL — LOW (ref 32–37)
MCV RBC AUTO: 98.3 FL — HIGH (ref 80–94)
MICROCYTES BLD QL: SLIGHT — SIGNIFICANT CHANGE UP
MONOCYTES # BLD AUTO: 0.22 K/UL — SIGNIFICANT CHANGE UP (ref 0.1–0.6)
MONOCYTES NFR BLD AUTO: 5.3 % — SIGNIFICANT CHANGE UP (ref 1.7–9.3)
NEUTROPHILS # BLD AUTO: 2.02 K/UL — SIGNIFICANT CHANGE UP (ref 1.4–6.5)
NEUTROPHILS NFR BLD AUTO: 49.1 % — SIGNIFICANT CHANGE UP (ref 42.2–75.2)
NRBC # BLD: 1 /100 — HIGH (ref 0–0)
NRBC # BLD: SIGNIFICANT CHANGE UP /100 WBCS (ref 0–0)
PLAT MORPH BLD: ABNORMAL
PLATELET # BLD AUTO: 236 K/UL — SIGNIFICANT CHANGE UP (ref 130–400)
POIKILOCYTOSIS BLD QL AUTO: SLIGHT — SIGNIFICANT CHANGE UP
POLYCHROMASIA BLD QL SMEAR: SIGNIFICANT CHANGE UP
POTASSIUM SERPL-MCNC: 4.9 MMOL/L — SIGNIFICANT CHANGE UP (ref 3.5–5)
POTASSIUM SERPL-SCNC: 4.9 MMOL/L — SIGNIFICANT CHANGE UP (ref 3.5–5)
PROT SERPL-MCNC: 6 G/DL — SIGNIFICANT CHANGE UP (ref 6–8)
PROTHROM AB SERPL-ACNC: 11.1 SEC — SIGNIFICANT CHANGE UP (ref 9.95–12.87)
RBC # BLD: 2.31 M/UL — LOW (ref 4.7–6.1)
RBC # FLD: 15.5 % — HIGH (ref 11.5–14.5)
RBC BLD AUTO: ABNORMAL
SODIUM SERPL-SCNC: 134 MMOL/L — LOW (ref 135–146)
VARIANT LYMPHS # BLD: 1.7 % — SIGNIFICANT CHANGE UP (ref 0–5)
WBC # BLD: 4.11 K/UL — LOW (ref 4.8–10.8)
WBC # FLD AUTO: 4.11 K/UL — LOW (ref 4.8–10.8)

## 2021-05-20 PROCEDURE — 86077 PHYS BLOOD BANK SERV XMATCH: CPT

## 2021-05-20 PROCEDURE — 99220: CPT

## 2021-05-20 NOTE — ED CDU PROVIDER DISPOSITION NOTE - CLINICAL COURSE
p patient presented with chronic anemia, hemoglobin 7.2. Pt transfused 2 units, no signs of volume overload or chf, pt to f/u with heme onc as outpt.

## 2021-05-20 NOTE — ED PROVIDER NOTE - PROGRESS NOTE DETAILS
ED Attending HILL Fortune  Hgb 7.2, pt aware, consented for blood transfusion, witnessed, given to  to scan. ED Attending HILL Fortune  pt aware of plan for obs for transfusion, two units ordered at this time, obs team aware of pt.

## 2021-05-20 NOTE — ED CDU PROVIDER INITIAL DAY NOTE - OBJECTIVE STATEMENT
54 yo M with pmhx of multiple myeloma presenting today with anemia for blood transfusion. Patient was found to have hgb 7.5 in Dr. Valadez's office and sent in for 2uPRBCs. Pt was here in ED yesterday similar issue however blood has antibodies and so took a prolonged time to procure, resulting in patient leaving AMA yesterday. Patient denies any changes interim, no chest pain/syncope/shortness of breath/leg swelling/hemoptysis/BRBPR/melena.

## 2021-05-20 NOTE — ED CDU PROVIDER INITIAL DAY NOTE - PROGRESS NOTE DETAILS
received signout from Ruel Swanson - pt was in obs for transfusion of 2 units of blood due to chronic anemia(Multiple myeloma); pt completed 2nd unit without issues; will plan discharge and f/u with Dr. Valadez hematology;

## 2021-05-20 NOTE — ED PROVIDER NOTE - OBJECTIVE STATEMENT
55M hx multiple myeloma on chemo presents with anemia. patient was here yesterday for same complaint, was found to have hgb 7.5 in Dr. Valadez's office and sent in for 2uPRBCs, however blood has antibodies and so took a prolonged time to procure, resulting in patient leaving AMA yesterday before receiving transfusions for an evening appointment. Patient denies any changes interim, no chest pain/syncope/shortness of breath/leg swelling/hemoptysis/BRBPR/melena.

## 2021-05-20 NOTE — ED CDU PROVIDER INITIAL DAY NOTE - NS ED ROS FT
Review of Systems:  	•	CONSTITUTIONAL - no fever, no diaphoresis, no chills  	•	SKIN - no rash  	•	HEMATOLOGIC - no bleeding, no bruising  	•	EYES - no eye pain, no blurry vision  	•	ENT - no congestion  	•	RESPIRATORY - no shortness of breath, no cough  	•	CARDIAC - no chest pain, no palpitations  	•	GI - no abd pain, no nausea, no vomiting, no diarrhea, no constipation  	•	GENITO-URINARY - no dysuria; no hematuria, no increased urinary frequency  	•	MUSCULOSKELETAL - no joint paint, no swelling, no redness  	•	NEUROLOGIC - no weakness, no headache, no paresthesias, no LOC  	•	PSYCH - no anxiety, no depression  	All other ROS are negative except as documented in HPI.

## 2021-05-20 NOTE — ED CDU PROVIDER DISPOSITION NOTE - NSFOLLOWUPINSTRUCTIONS_ED_ALL_ED_FT
Blood Transfusion, Adult, Care After  This sheet gives you information about how to care for yourself after your procedure. Your doctor may also give you more specific instructions. If you have problems or questions, contact your doctor.    Follow these instructions at home:  Image   Take over-the-counter and prescription medicines only as told by your doctor.  Go back to your normal activities as told by your doctor.  Follow instructions from your doctor about how to take care of the area where an IV tube was put into your vein (insertion site). Make sure you:  Wash your hands with soap and water before you change your bandage (dressing). If there is no soap and water, use hand .  Change your bandage as told by your doctor.  Check your IV insertion site every day for signs of infection. Check for:  More redness, swelling, or pain.  More fluid or blood.  Warmth.  Pus or a bad smell.  Contact a doctor if:  You have more redness, swelling, or pain around the IV insertion site..  You have more fluid or blood coming from the IV insertion site.  Your IV insertion site feels warm to the touch.  You have pus or a bad smell coming from the IV insertion site.  Your pee (urine) turns pink, red, or brown.  You feel weak after doing your normal activities.  Get help right away if:  You have signs of a serious allergic or body defense (immune) system reaction, including:  Itchiness.  Hives.  Trouble breathing.  Anxiety.  Pain in your chest or lower back.  Fever, flushing, and chills.  Fast pulse.  Rash.  Watery poop (diarrhea).  Throwing up (vomiting).  Dark pee.  Serious headache.  Dizziness.  Stiff neck.  Yellow color in your face or the white parts of your eyes (jaundice).  Summary  After a blood transfusion, return to your normal activities as told by your doctor.  Every day, check for signs of infection where the IV tube was put into your vein.  Some signs of infection are warm skin, more redness and pain, more fluid or blood, and pus or a bad smell where the needle went in.  Contact your doctor if you feel weak or have any unusual symptoms.  This information is not intended to replace advice given to you by your health care provider. Make sure you discuss any questions you have with your health care provider.      Anemia    Anemia is a condition in which the concentration of red blood cells or hemoglobin in the blood is below normal. Hemoglobin is a substance in red blood cells that carries oxygen to the tissues of the body. Anemia results in not enough oxygen reaching these tissues which can cause symptoms such as weakness, dizziness/lightheadedness, shortness of breath, chest pain, paleness, or nausea.    SEEK IMMEDIATE MEDICAL CARE IF YOU HAVE THE FOLLOWING SYMPTOMS: extreme weakness/chest pain/shortness of breath, black or bloody stools, vomiting blood, fainting, fever, or any signs of dehydration.

## 2021-05-20 NOTE — ED PROVIDER NOTE - CLINICAL SUMMARY MEDICAL DECISION MAKING FREE TEXT BOX
pt presented for anemia, seen in ed yesterday but blood products were not available specific to  pt, pt signed out ama, returned today for transfusion as blood products were held for him, pt aware, results reviewed, obs team aware of pt as discussed with pt.

## 2021-05-20 NOTE — ED PROVIDER NOTE - CARE PLAN
Assessment and plan of treatment:	Plan: labs, consent for blood transfusion.   Principal Discharge DX:	Anemia  Assessment and plan of treatment:	Plan: labs, consent for blood transfusion.

## 2021-05-20 NOTE — ED CDU PROVIDER DISPOSITION NOTE - PATIENT PORTAL LINK FT
You can access the FollowMyHealth Patient Portal offered by [facility] by registering at the following website: [email or web address]. By joining [employer]’s FollowMyHealth portal, you will also be able to view your health information using other applications (apps) compatible with our system.

## 2021-05-20 NOTE — ED CDU PROVIDER INITIAL DAY NOTE - ATTENDING CONTRIBUTION TO CARE
55yr old male placed in edou under transfusion protocol. pt with multiple myeloma on chemo had outpt blood work by Dr. Valadez showing hemoglobin 7.5. pt here with no complaints, denies rectal bleeding. On exam pt no distress, s1s2 ctab soft nt/nd, no calf swelling.

## 2021-05-20 NOTE — ED CDU PROVIDER DISPOSITION NOTE - CARE PROVIDER_API CALL
Celia Valadez  HEMATOLOGY  [address] [place] [address]  Phone: [phone]  Fax: [phone]  Established Patient  Follow Up Time: 1-3 Days

## 2021-05-20 NOTE — ED PROVIDER NOTE - ATTENDING CONTRIBUTION TO CARE
56 y/o m w/ pmhx of Multiple Myeloma, receiving chemo 3x a month with last chemo ~ 3 days ago, follows with heme/onc Dr. Valadez, seen yesterday fo low hgb on outpatient labs and Dr. Valadez requested 2 units prbcs. Associated symptoms include, mild SOB and generalized weakness typical of when his Hgb is low. He reports baseline around 8.5-9. Pt was seen in ED yesterday, hgb was 7.3, had to wait for blood to be brought from another facility due to antibodies, pt did not want to wait so signed out AMA to come back this morning for blood transfusion as blood is present today. No fever, chills, n/v, cp,  pleuritic cp, palpitations, diaphoresis, cough, ha/lh/dizziness, numbness/tingling, neck pain/ stiffness, abd pain, diarrhea, constipation, melena/brbpr, urinary symptoms, trauma, calf pain/swelling/erythema, sick contacts, recent travel or rash.    Vital Signs: I have reviewed the initial vital signs. Constitutional: Male pt sitting on stretcher speaking full sentences. Integumentary: No rash. (+) Pallor (+) EYES: Pale conjunctivae. ENT: MMM NECK: Supple, non-tender, no meningeal signs. Cardiovascular: Tachy, radial pulses 2/4 b/l. No JVD. Respiratory: BS present b/l, ctabl, no wheezing or crackles, gno accessory muscle use, no stridor. Gastrointestinal: BS present throughout all 4 quadrants, soft, nd, nt, no rebound tenderness or guarding, no cvat. Musculoskeletal: FROM, no calf pain/swelling/erythema. Neurologic: AAOx3, motor 5/5 and sensation intact throughout upper and lowe ext, CN II-XII intact, No facial droop or slurring of speech. No focal deficits.

## 2021-05-20 NOTE — ED PROVIDER NOTE - PHYSICAL EXAMINATION
Vital Signs: I have reviewed the initial vital signs.  Constitutional: well-nourished, appears stated age, no acute distress.  HEENT: Airway patent, moist MM, no erythema/swelling/deformity of oral structures. EOMI, PERRLA.  CV: regular rate, regular rhythm, well-perfused extremities, 2+ b/l DP and radial pulses equal.  Lungs: BCTA, no increased WOB.  ABD: NTND, no guarding or rebound, no pulsatile mass, no hernias, no flank pain.   MSK: Neck supple, nontender, nl ROM, no stepoff. Chest nontender. Back nontender in TLS spine or to b/l bony structures. Ext nontender, nl rom, no deformity.   INTEG: Skin warm, dry, no rash.  NEURO: A&Ox3, moving all extremities, normal speech  PSYCH: Calm, cooperative, normal affect and interaction.

## 2021-05-20 NOTE — ED ADULT TRIAGE NOTE - CHIEF COMPLAINT QUOTE
c/o low H/H 7.2, patient states was here for blood transfusion yesterday and was told to come back due to shortage.

## 2021-06-11 ENCOUNTER — EMERGENCY (EMERGENCY)
Facility: HOSPITAL | Age: 56
LOS: 0 days | Discharge: HOME | End: 2021-06-11
Attending: EMERGENCY MEDICINE | Admitting: EMERGENCY MEDICINE
Payer: COMMERCIAL

## 2021-06-11 VITALS
HEART RATE: 84 BPM | SYSTOLIC BLOOD PRESSURE: 141 MMHG | TEMPERATURE: 98 F | RESPIRATION RATE: 18 BRPM | DIASTOLIC BLOOD PRESSURE: 62 MMHG | OXYGEN SATURATION: 99 %

## 2021-06-11 VITALS
TEMPERATURE: 98 F | WEIGHT: 145.06 LBS | OXYGEN SATURATION: 100 % | HEIGHT: 71 IN | HEART RATE: 109 BPM | RESPIRATION RATE: 20 BRPM | SYSTOLIC BLOOD PRESSURE: 125 MMHG | DIASTOLIC BLOOD PRESSURE: 61 MMHG

## 2021-06-11 DIAGNOSIS — S90.822A BLISTER (NONTHERMAL), LEFT FOOT, INITIAL ENCOUNTER: ICD-10-CM

## 2021-06-11 DIAGNOSIS — S90.821A BLISTER (NONTHERMAL), RIGHT FOOT, INITIAL ENCOUNTER: ICD-10-CM

## 2021-06-11 DIAGNOSIS — C90.00 MULTIPLE MYELOMA NOT HAVING ACHIEVED REMISSION: ICD-10-CM

## 2021-06-11 DIAGNOSIS — L03.116 CELLULITIS OF LEFT LOWER LIMB: ICD-10-CM

## 2021-06-11 DIAGNOSIS — L03.115 CELLULITIS OF RIGHT LOWER LIMB: ICD-10-CM

## 2021-06-11 DIAGNOSIS — Z20.822 CONTACT WITH AND (SUSPECTED) EXPOSURE TO COVID-19: ICD-10-CM

## 2021-06-11 DIAGNOSIS — M79.89 OTHER SPECIFIED SOFT TISSUE DISORDERS: ICD-10-CM
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
## 2021-06-11 LAB
ALBUMIN SERPL ELPH-MCNC: 3.3 G/DL — LOW (ref 3.5–5.2)
ALP SERPL-CCNC: 72 U/L — SIGNIFICANT CHANGE UP (ref 30–115)
ALT FLD-CCNC: 14 U/L — SIGNIFICANT CHANGE UP (ref 0–41)
ANION GAP SERPL CALC-SCNC: 13 MMOL/L — SIGNIFICANT CHANGE UP (ref 7–14)
AST SERPL-CCNC: 11 U/L — SIGNIFICANT CHANGE UP (ref 0–41)
BASOPHILS # BLD AUTO: 0.01 K/UL — SIGNIFICANT CHANGE UP (ref 0–0.2)
BASOPHILS NFR BLD AUTO: 0.1 % — SIGNIFICANT CHANGE UP (ref 0–1)
BILIRUB SERPL-MCNC: <0.2 MG/DL — SIGNIFICANT CHANGE UP (ref 0.2–1.2)
BUN SERPL-MCNC: 35 MG/DL — HIGH (ref 10–20)
CALCIUM SERPL-MCNC: 9 MG/DL — SIGNIFICANT CHANGE UP (ref 8.5–10.1)
CHLORIDE SERPL-SCNC: 98 MMOL/L — SIGNIFICANT CHANGE UP (ref 98–110)
CO2 SERPL-SCNC: 23 MMOL/L — SIGNIFICANT CHANGE UP (ref 17–32)
CREAT SERPL-MCNC: 1.9 MG/DL — HIGH (ref 0.7–1.5)
EOSINOPHIL # BLD AUTO: 0 K/UL — SIGNIFICANT CHANGE UP (ref 0–0.7)
EOSINOPHIL NFR BLD AUTO: 0 % — SIGNIFICANT CHANGE UP (ref 0–8)
GLUCOSE SERPL-MCNC: 163 MG/DL — HIGH (ref 70–99)
HCT VFR BLD CALC: 31.4 % — LOW (ref 42–52)
HGB BLD-MCNC: 10.4 G/DL — LOW (ref 14–18)
IMM GRANULOCYTES NFR BLD AUTO: 0.3 % — SIGNIFICANT CHANGE UP (ref 0.1–0.3)
LACTATE SERPL-SCNC: 3.1 MMOL/L — HIGH (ref 0.7–2)
LYMPHOCYTES # BLD AUTO: 1.07 K/UL — LOW (ref 1.2–3.4)
LYMPHOCYTES # BLD AUTO: 10.6 % — LOW (ref 20.5–51.1)
MCHC RBC-ENTMCNC: 31.3 PG — HIGH (ref 27–31)
MCHC RBC-ENTMCNC: 33.1 G/DL — SIGNIFICANT CHANGE UP (ref 32–37)
MCV RBC AUTO: 94.6 FL — HIGH (ref 80–94)
MONOCYTES # BLD AUTO: 0.75 K/UL — HIGH (ref 0.1–0.6)
MONOCYTES NFR BLD AUTO: 7.4 % — SIGNIFICANT CHANGE UP (ref 1.7–9.3)
NEUTROPHILS # BLD AUTO: 8.24 K/UL — HIGH (ref 1.4–6.5)
NEUTROPHILS NFR BLD AUTO: 81.6 % — HIGH (ref 42.2–75.2)
NRBC # BLD: 0 /100 WBCS — SIGNIFICANT CHANGE UP (ref 0–0)
PLATELET # BLD AUTO: 327 K/UL — SIGNIFICANT CHANGE UP (ref 130–400)
POTASSIUM SERPL-MCNC: 5.2 MMOL/L — HIGH (ref 3.5–5)
POTASSIUM SERPL-SCNC: 5.2 MMOL/L — HIGH (ref 3.5–5)
PROT SERPL-MCNC: 5.4 G/DL — LOW (ref 6–8)
RAPID RVP RESULT: SIGNIFICANT CHANGE UP
RBC # BLD: 3.32 M/UL — LOW (ref 4.7–6.1)
RBC # FLD: 15.3 % — HIGH (ref 11.5–14.5)
SARS-COV-2 RNA SPEC QL NAA+PROBE: SIGNIFICANT CHANGE UP
SODIUM SERPL-SCNC: 134 MMOL/L — LOW (ref 135–146)
WBC # BLD: 10.1 K/UL — SIGNIFICANT CHANGE UP (ref 4.8–10.8)
WBC # FLD AUTO: 10.1 K/UL — SIGNIFICANT CHANGE UP (ref 4.8–10.8)

## 2021-06-11 PROCEDURE — 73630 X-RAY EXAM OF FOOT: CPT | Mod: 26,RT

## 2021-06-11 PROCEDURE — 99284 EMERGENCY DEPT VISIT MOD MDM: CPT

## 2021-06-11 PROCEDURE — 93010 ELECTROCARDIOGRAM REPORT: CPT | Mod: NC

## 2021-06-11 RX ORDER — AMPICILLIN SODIUM AND SULBACTAM SODIUM 250; 125 MG/ML; MG/ML
3 INJECTION, POWDER, FOR SUSPENSION INTRAMUSCULAR; INTRAVENOUS ONCE
Refills: 0 | Status: COMPLETED | OUTPATIENT
Start: 2021-06-11 | End: 2021-06-11

## 2021-06-11 RX ADMIN — AMPICILLIN SODIUM AND SULBACTAM SODIUM 200 GRAM(S): 250; 125 INJECTION, POWDER, FOR SUSPENSION INTRAMUSCULAR; INTRAVENOUS at 11:31

## 2021-06-11 NOTE — ED ADULT NURSE NOTE - NSIMPLEMENTINTERV_GEN_ALL_ED
Implemented All Universal Safety Interventions:  [place] to call system. Call bell, personal items and telephone within reach. Instruct patient to call for assistance. Room bathroom lighting operational. Non-slip footwear when patient is off stretcher. Physically safe environment: no spills, clutter or unnecessary equipment. Stretcher in lowest position, wheels locked, appropriate side rails in place.

## 2021-06-11 NOTE — ED PROVIDER NOTE - NSFOLLOWUPINSTRUCTIONS_ED_ALL_ED_FT
Cellulitis    Cellulitis is a skin infection caused by bacteria. This condition occurs most often in the arms and lower legs but can occur anywhere over the body. Symptoms include redness, swelling, warm skin, tenderness, and chills/fever. If you were prescribed an antibiotic medicine, take it as told by your health care provider. Do not stop taking the antibiotic even if you start to feel better.    SEEK IMMEDIATE MEDICAL CARE IF YOU HAVE THE FOLLOWING SYMPTOMS: worsening fever, red streaks coming from affected area, vomiting or diarrhea, or dizziness/lightheadedness.     Blisters, Adult    A blister is a raised bubble of skin filled with liquid. Blisters often develop in an area of the skin that repeatedly rubs or presses against another surface (friction blister). Friction blisters can occur on any part of the body, but they usually develop on the hands or feet. Long-term pressure on the same area of the skin can also lead to areas of hardened skin (calluses).    What are the causes?  A blister can be caused by:    An injury.  A burn.  An allergic reaction.  An infection.  Exposure to irritating chemicals.  Friction, especially in an area with a lot of heat and moisture.    Friction blisters often result from:    Sports.  Repetitive activities.  Using tools and doing other activities without wearing gloves.  Shoes that are too tight or too loose.    What are the signs or symptoms?  A blister is often round and looks like a bump. It may:    Itch.  Be painful to the touch.    Before a blister forms, the skin may:    Become red.  Feel warm.  Itch.  Be painful to the touch.    How is this diagnosed?  A blister is diagnosed with a physical exam.    How is this treated?  Treatment usually involves protecting the area where the blister has formed until the skin has healed. Other treatments may include:    A bandage (dressing) to cover the blister.  Extra padding around and over the blister, so that it does not rub on anything.  Antibiotic ointment.    Most blisters break open, dry up, and go away on their own within 1–2 weeks. Blisters that are very painful may be drained before they break open on their own. If the blister is large or painful, it can be drained by:    Sterilizing a small needle with rubbing alcohol.    Washing your hands with soap and water.    Inserting the needle in the edge of the blister to make a small hole. Some fluid will drain out of the hole. Let the top or roof of the blister stay in place. This helps the skin heal.    Washing the blister with mild soap and water.    Covering the blister with antibiotic ointment, if prescribed by your health care provider, and a dressing.      Some blisters may need to be drained by a health care provider.    Follow these instructions at home:  Protect the area where the blister has formed as told by your health care provider.  Keep your blister clean and dry. This helps to prevent infection.  Do not pop your blister. This can cause infection.  ImageIf you were prescribed an antibiotic, use it as told by your health care provider. Do not stop using the antibiotic even if your condition improves.  Wear different shoes until the blister heals.  Avoid the activity that caused the blister until your blister heals.  Check your blister every day for signs of infection. Check for:    More redness, swelling, or pain.  More fluid or blood.  Warmth.  Pus or a bad smell.  The blister getting better and then getting worse.    How is this prevented?  Taking these steps can help to prevent blisters that are caused by friction. Make sure you:    Wear comfortable shoes that fit well.  Always wear socks with shoes.  Wear extra socks or use tape, bandages, or pads over blister-prone areas as needed. You may also apply petroleum jelly under bandages in blister-prone areas.  Wear protective gear, such as gloves, when participating in sports or activities that can cause blisters.  Wear loose-fitting, moisture-wicking clothes when participating in sports or activities.  Use powders as needed to keep your feet dry.    Contact a health care provider if:  You have more redness, swelling, or pain around your blister.  You have more fluid or blood coming from your blister.  Your blister feels warm to the touch.  You have pus or a bad smell coming from your blister.  You have a fever or chills.  Your blister gets better and then it gets worse.  This information is not intended to replace advice given to you by your health care provider. Make sure you discuss any questions you have with your health care provider.

## 2021-06-11 NOTE — ED PROVIDER NOTE - CLINICAL SUMMARY MEDICAL DECISION MAKING FREE TEXT BOX
in view of VS and WBC, In my opinion, out patient treatment and follow up are appropriate.  Adjustments to foot wear d/w pt.  prompt f/u arranged.

## 2021-06-11 NOTE — ED PROVIDER NOTE - OBJECTIVE STATEMENT
56 yo M  hx of multiple myeloma on chemo c/o infection to feet. Patient had blisters to b/l feet x 1 month. 3-4 days ago blisters popped and wounds started getting red. He saw Dr Valadez yesterday and was told to go to ED for evaluation. No fevers. Recently had duplex to r/o dvt.

## 2021-06-11 NOTE — ED PROVIDER NOTE - PROGRESS NOTE DETAILS
ATTENDING NOTE: 54 y/o M presents with gradual onset swelling of both legs and feet related to decrease in diuretic dose. An o/p doppler was negative for DVT. Pt denies any fever or chills. On exam: (+) significant pedal edema with b/l redness and skin tears of the dorsum of the feet which are symmetric. Pt reports skin alterations are noted to rubbing of feet in his sneakers which have increased since the edema has increased. Diagnostic testing reviewed. I do not believe the Pt is septic at this time. 1 dose of IV ABX given in the ED. In my opinion, Pt may be safely d/c on oral ABX. Discussed with Dr Valadez. Agrees with plan for PO abx, NO Lasix, will f/u as outpatient

## 2021-06-11 NOTE — ED ADULT TRIAGE NOTE - NS ED TRIAGE AVPU SCALE
actual/standing Alert-The patient is alert, awake and responds to voice. The patient is oriented to time, place, and person. The triage nurse is able to obtain subjective information.

## 2021-06-11 NOTE — ED PROVIDER NOTE - PHYSICAL EXAMINATION
Gen: Alert, NAD, well appearing  Head: NC, AT, PERRL, EOMI, normal lids/conjunctiva  ENT: normal hearing  Neck: +supple, no tenderness/meningismus,  Pulm: Bilateral BS, normal resp effort, no wheeze/stridor/retractions  CV: S1S2, tachy  Abd: soft, NT/ND  Mskel: no edema/erythema/cyanosis  Skin: no rash, warm/dry. Feet: + opened blisters noted to dorsum of feet by distal metatarsal with surrounding erythema  Neuro: AAOx3, no sensory/motor deficits Gen: Alert, NAD, well appearing  Head: NC, AT, PERRL, EOMI, normal lids/conjunctiva  ENT: normal hearing  Neck: +supple, no tenderness/meningismus,  Pulm: Bilateral BS, normal resp effort, no wheeze/stridor/retractions  CV: S1S2, tachy  Abd: soft, NT/ND  Mskel: +edema to legs. No cyanosis  Skin: no rash, warm/dry. Feet: + opened blisters noted to dorsum of feet by distal metatarsal with surrounding erythema  Neuro: AAOx3, no sensory/motor deficits

## 2021-06-11 NOTE — ED PROVIDER NOTE - NS ED ROS FT
Review of Systems    Constitutional: (-) fever, (-) chills  Eyes/ENT: (-) blurry vision, (-) epistaxis, (-) sore throat  Cardiovascular: (-) chest pain, (-) syncope  Respiratory: (-) cough, (-) shortness of breath  Gastrointestinal: (-) pain, (-) nausea, (-) vomiting, (-) diarrhea  Musculoskeletal: (-) neck pain, (-) back pain, (-) body aches  Integumentary: (-) rash, (-) edema, (+) infected blisters to feet  Neurological: (-) headache, (-) altered mental status  Psychiatric: (-) hallucinations  Allergic/Immunologic: (-) pruritus
Color consistent with ethnicity/race, warm, dry intact, resilient.

## 2021-06-11 NOTE — ED PROVIDER NOTE - CARE PROVIDER_API CALL
Celai Valadez  HEMATOLOGY  [address] [place] [address]  Phone: [phone]  Fax: [phone]  Follow Up Time: 1-3 Days

## 2021-06-11 NOTE — ED PROVIDER NOTE - PATIENT PORTAL LINK FT
You can access the FollowMyHealth Patient Portal offered by [facility] by registering at the following website: [email or web address]. By joining [employer]’s FollowMyHealth portal, you will also be able to view your health information using other applications (apps) compatible with our system.

## 2022-03-15 NOTE — ED PROVIDER NOTE - PMH
LM on patient VM short refill given, patient needs to call office to set up appt for further refills. Yulisa Duenas on 3/15/2022 at 10:52 AM    
Patient called back, she already have up coming appointment for her annual physical.    Jolynn Hicks on 3/15/2022 at 1:06 PM    
Pending Prescriptions:                       Disp   Refills    lidocaine (XYLOCAINE) 5 % external ointmen*50 g   0        Sig: Apply topically as needed for moderate pain    Routing refill request to provider for review/approval because:  Patient needs to be seen because it has been more than 1 year since last office visit.      
Short refill, due for follow-up visit  
Multiple myeloma, remission status unspecified

## 2023-01-01 ENCOUNTER — NON-APPOINTMENT (OUTPATIENT)
Age: 58
End: 2023-01-01

## 2023-01-01 ENCOUNTER — INPATIENT (INPATIENT)
Facility: HOSPITAL | Age: 58
LOS: 1 days | DRG: 640 | End: 2023-11-12
Attending: INTERNAL MEDICINE | Admitting: STUDENT IN AN ORGANIZED HEALTH CARE EDUCATION/TRAINING PROGRAM
Payer: COMMERCIAL

## 2023-01-01 ENCOUNTER — INPATIENT (INPATIENT)
Facility: HOSPITAL | Age: 58
LOS: 9 days | Discharge: HOME | DRG: 840 | End: 2023-10-26
Attending: STUDENT IN AN ORGANIZED HEALTH CARE EDUCATION/TRAINING PROGRAM | Admitting: STUDENT IN AN ORGANIZED HEALTH CARE EDUCATION/TRAINING PROGRAM
Payer: COMMERCIAL

## 2023-01-01 ENCOUNTER — TRANSCRIPTION ENCOUNTER (OUTPATIENT)
Age: 58
End: 2023-01-01

## 2023-01-01 VITALS
HEIGHT: 71 IN | WEIGHT: 125 LBS | SYSTOLIC BLOOD PRESSURE: 146 MMHG | OXYGEN SATURATION: 98 % | RESPIRATION RATE: 16 BRPM | HEART RATE: 100 BPM | DIASTOLIC BLOOD PRESSURE: 82 MMHG | TEMPERATURE: 98 F

## 2023-01-01 VITALS
TEMPERATURE: 98 F | HEIGHT: 71 IN | DIASTOLIC BLOOD PRESSURE: 78 MMHG | WEIGHT: 100.09 LBS | RESPIRATION RATE: 16 BRPM | SYSTOLIC BLOOD PRESSURE: 109 MMHG | HEART RATE: 101 BPM

## 2023-01-01 VITALS
SYSTOLIC BLOOD PRESSURE: 118 MMHG | DIASTOLIC BLOOD PRESSURE: 66 MMHG | RESPIRATION RATE: 18 BRPM | HEART RATE: 90 BPM | TEMPERATURE: 97 F

## 2023-01-01 DIAGNOSIS — L89.899 PRESSURE ULCER OF OTHER SITE, UNSPECIFIED STAGE: ICD-10-CM

## 2023-01-01 DIAGNOSIS — E83.51 HYPOCALCEMIA: ICD-10-CM

## 2023-01-01 DIAGNOSIS — E42 MARASMIC KWASHIORKOR: ICD-10-CM

## 2023-01-01 DIAGNOSIS — N17.9 ACUTE KIDNEY FAILURE, UNSPECIFIED: ICD-10-CM

## 2023-01-01 DIAGNOSIS — M40.204 UNSPECIFIED KYPHOSIS, THORACIC REGION: ICD-10-CM

## 2023-01-01 DIAGNOSIS — G62.9 POLYNEUROPATHY, UNSPECIFIED: ICD-10-CM

## 2023-01-01 DIAGNOSIS — J18.9 PNEUMONIA, UNSPECIFIED ORGANISM: ICD-10-CM

## 2023-01-01 DIAGNOSIS — C90.00 MULTIPLE MYELOMA NOT HAVING ACHIEVED REMISSION: ICD-10-CM

## 2023-01-01 DIAGNOSIS — I51.81 TAKOTSUBO SYNDROME: ICD-10-CM

## 2023-01-01 DIAGNOSIS — E40 KWASHIORKOR: ICD-10-CM

## 2023-01-01 DIAGNOSIS — E83.42 HYPOMAGNESEMIA: ICD-10-CM

## 2023-01-01 DIAGNOSIS — Z87.891 PERSONAL HISTORY OF NICOTINE DEPENDENCE: ICD-10-CM

## 2023-01-01 DIAGNOSIS — Z43.1 ENCOUNTER FOR ATTENTION TO GASTROSTOMY: ICD-10-CM

## 2023-01-01 DIAGNOSIS — E87.1 HYPO-OSMOLALITY AND HYPONATREMIA: ICD-10-CM

## 2023-01-01 DIAGNOSIS — N18.4 CHRONIC KIDNEY DISEASE, STAGE 4 (SEVERE): ICD-10-CM

## 2023-01-01 DIAGNOSIS — I21.A1 MYOCARDIAL INFARCTION TYPE 2: ICD-10-CM

## 2023-01-01 DIAGNOSIS — K29.70 GASTRITIS, UNSPECIFIED, WITHOUT BLEEDING: ICD-10-CM

## 2023-01-01 DIAGNOSIS — E87.20 ACIDOSIS, UNSPECIFIED: ICD-10-CM

## 2023-01-01 DIAGNOSIS — R62.7 ADULT FAILURE TO THRIVE: ICD-10-CM

## 2023-01-01 DIAGNOSIS — N18.32 CHRONIC KIDNEY DISEASE, STAGE 3B: ICD-10-CM

## 2023-01-01 DIAGNOSIS — Z51.5 ENCOUNTER FOR PALLIATIVE CARE: ICD-10-CM

## 2023-01-01 DIAGNOSIS — G56.03 CARPAL TUNNEL SYNDROM,BILATERAL UPPER LIMBS: ICD-10-CM

## 2023-01-01 DIAGNOSIS — J98.11 ATELECTASIS: ICD-10-CM

## 2023-01-01 DIAGNOSIS — E43 UNSPECIFIED SEVERE PROTEIN-CALORIE MALNUTRITION: ICD-10-CM

## 2023-01-01 DIAGNOSIS — E83.52 HYPERCALCEMIA: ICD-10-CM

## 2023-01-01 DIAGNOSIS — R64 CACHEXIA: ICD-10-CM

## 2023-01-01 DIAGNOSIS — D72.829 ELEVATED WHITE BLOOD CELL COUNT, UNSPECIFIED: ICD-10-CM

## 2023-01-01 DIAGNOSIS — Z66 DO NOT RESUSCITATE: ICD-10-CM

## 2023-01-01 DIAGNOSIS — E53.8 DEFICIENCY OF OTHER SPECIFIED B GROUP VITAMINS: ICD-10-CM

## 2023-01-01 DIAGNOSIS — E46 UNSPECIFIED PROTEIN-CALORIE MALNUTRITION: ICD-10-CM

## 2023-01-01 DIAGNOSIS — R13.10 DYSPHAGIA, UNSPECIFIED: ICD-10-CM

## 2023-01-01 DIAGNOSIS — D63.0 ANEMIA IN NEOPLASTIC DISEASE: ICD-10-CM

## 2023-01-01 DIAGNOSIS — R13.13 DYSPHAGIA, PHARYNGEAL PHASE: ICD-10-CM

## 2023-01-01 DIAGNOSIS — J96.01 ACUTE RESPIRATORY FAILURE WITH HYPOXIA: ICD-10-CM

## 2023-01-01 DIAGNOSIS — G56.23 LESION OF ULNAR NERVE, BILATERAL UPPER LIMBS: ICD-10-CM

## 2023-01-01 DIAGNOSIS — E87.5 HYPERKALEMIA: ICD-10-CM

## 2023-01-01 LAB
% ALBUMIN: 47.2 % — SIGNIFICANT CHANGE UP
% ALBUMIN: 47.2 % — SIGNIFICANT CHANGE UP
% ALBUMIN: 48.1 % — SIGNIFICANT CHANGE UP
% ALBUMIN: 48.1 % — SIGNIFICANT CHANGE UP
% ALPHA 1: 6.7 % — SIGNIFICANT CHANGE UP
% ALPHA 1: 6.7 % — SIGNIFICANT CHANGE UP
% ALPHA 1: 6.9 % — SIGNIFICANT CHANGE UP
% ALPHA 1: 6.9 % — SIGNIFICANT CHANGE UP
% ALPHA 2: 12.9 % — SIGNIFICANT CHANGE UP
% ALPHA 2: 12.9 % — SIGNIFICANT CHANGE UP
% ALPHA 2: 13.3 % — SIGNIFICANT CHANGE UP
% ALPHA 2: 13.3 % — SIGNIFICANT CHANGE UP
% BETA: 10.4 % — SIGNIFICANT CHANGE UP
% BETA: 10.4 % — SIGNIFICANT CHANGE UP
% BETA: 9.4 % — SIGNIFICANT CHANGE UP
% BETA: 9.4 % — SIGNIFICANT CHANGE UP
% GAMMA, URINE: 4.7 % — SIGNIFICANT CHANGE UP
% GAMMA, URINE: 4.7 % — SIGNIFICANT CHANGE UP
% GAMMA: 22.5 % — SIGNIFICANT CHANGE UP
% GAMMA: 22.5 % — SIGNIFICANT CHANGE UP
% GAMMA: 22.6 % — SIGNIFICANT CHANGE UP
% GAMMA: 22.6 % — SIGNIFICANT CHANGE UP
% M SPIKE, URINE: 58.3 % — SIGNIFICANT CHANGE UP
% M SPIKE, URINE: 58.3 % — SIGNIFICANT CHANGE UP
% M SPIKE: SIGNIFICANT CHANGE UP
A1C WITH ESTIMATED AVERAGE GLUCOSE RESULT: 5.2 % — SIGNIFICANT CHANGE UP (ref 4–5.6)
A1C WITH ESTIMATED AVERAGE GLUCOSE RESULT: 5.2 % — SIGNIFICANT CHANGE UP (ref 4–5.6)
ALBUMIN 24H MFR UR ELPH: 16.1 % — SIGNIFICANT CHANGE UP
ALBUMIN 24H MFR UR ELPH: 16.1 % — SIGNIFICANT CHANGE UP
ALBUMIN SERPL ELPH-MCNC: 2.7 G/DL — LOW (ref 3.5–5.2)
ALBUMIN SERPL ELPH-MCNC: 2.7 G/DL — LOW (ref 3.5–5.2)
ALBUMIN SERPL ELPH-MCNC: 2.8 G/DL — LOW (ref 3.5–5.2)
ALBUMIN SERPL ELPH-MCNC: 2.8 G/DL — LOW (ref 3.5–5.2)
ALBUMIN SERPL ELPH-MCNC: 2.9 G/DL — LOW (ref 3.6–5.5)
ALBUMIN SERPL ELPH-MCNC: 3.2 G/DL — LOW (ref 3.5–5.2)
ALBUMIN SERPL ELPH-MCNC: 3.2 G/DL — LOW (ref 3.5–5.2)
ALBUMIN SERPL ELPH-MCNC: 3.3 G/DL — LOW (ref 3.5–5.2)
ALBUMIN SERPL ELPH-MCNC: 3.4 G/DL — LOW (ref 3.5–5.2)
ALBUMIN SERPL ELPH-MCNC: 3.6 G/DL — SIGNIFICANT CHANGE UP (ref 3.5–5.2)
ALBUMIN SERPL ELPH-MCNC: 3.9 G/DL — SIGNIFICANT CHANGE UP (ref 3.5–5.2)
ALBUMIN/GLOB SERPL ELPH: 0.9 RATIO — SIGNIFICANT CHANGE UP
ALP SERPL-CCNC: 52 U/L — SIGNIFICANT CHANGE UP (ref 30–115)
ALP SERPL-CCNC: 52 U/L — SIGNIFICANT CHANGE UP (ref 30–115)
ALP SERPL-CCNC: 54 U/L — SIGNIFICANT CHANGE UP (ref 30–115)
ALP SERPL-CCNC: 57 U/L — SIGNIFICANT CHANGE UP (ref 30–115)
ALP SERPL-CCNC: 59 U/L — SIGNIFICANT CHANGE UP (ref 30–115)
ALP SERPL-CCNC: 59 U/L — SIGNIFICANT CHANGE UP (ref 30–115)
ALP SERPL-CCNC: 60 U/L — SIGNIFICANT CHANGE UP (ref 30–115)
ALP SERPL-CCNC: 68 U/L — SIGNIFICANT CHANGE UP (ref 30–115)
ALPHA1 GLOB 24H MFR UR ELPH: 8.8 % — SIGNIFICANT CHANGE UP
ALPHA1 GLOB 24H MFR UR ELPH: 8.8 % — SIGNIFICANT CHANGE UP
ALPHA1 GLOB SERPL ELPH-MCNC: 0.4 G/DL — SIGNIFICANT CHANGE UP (ref 0.1–0.4)
ALPHA2 GLOB 24H MFR UR ELPH: 5.3 % — SIGNIFICANT CHANGE UP
ALPHA2 GLOB 24H MFR UR ELPH: 5.3 % — SIGNIFICANT CHANGE UP
ALPHA2 GLOB SERPL ELPH-MCNC: 0.8 G/DL — SIGNIFICANT CHANGE UP (ref 0.5–1)
ALT FLD-CCNC: 11 U/L — SIGNIFICANT CHANGE UP (ref 0–41)
ALT FLD-CCNC: 12 U/L — SIGNIFICANT CHANGE UP (ref 0–41)
ALT FLD-CCNC: 13 U/L — SIGNIFICANT CHANGE UP (ref 0–41)
ALT FLD-CCNC: 15 U/L — SIGNIFICANT CHANGE UP (ref 0–41)
ALT FLD-CCNC: 36 U/L — SIGNIFICANT CHANGE UP (ref 0–41)
ALT FLD-CCNC: 36 U/L — SIGNIFICANT CHANGE UP (ref 0–41)
ALT FLD-CCNC: 37 U/L — SIGNIFICANT CHANGE UP (ref 0–41)
ALT FLD-CCNC: 37 U/L — SIGNIFICANT CHANGE UP (ref 0–41)
ALT FLD-CCNC: 40 U/L — SIGNIFICANT CHANGE UP (ref 0–41)
ALT FLD-CCNC: 40 U/L — SIGNIFICANT CHANGE UP (ref 0–41)
ANION GAP SERPL CALC-SCNC: 10 MMOL/L — SIGNIFICANT CHANGE UP (ref 7–14)
ANION GAP SERPL CALC-SCNC: 10 MMOL/L — SIGNIFICANT CHANGE UP (ref 7–14)
ANION GAP SERPL CALC-SCNC: 11 MMOL/L — SIGNIFICANT CHANGE UP (ref 7–14)
ANION GAP SERPL CALC-SCNC: 12 MMOL/L — SIGNIFICANT CHANGE UP (ref 7–14)
ANION GAP SERPL CALC-SCNC: 13 MMOL/L — SIGNIFICANT CHANGE UP (ref 7–14)
ANION GAP SERPL CALC-SCNC: 13 MMOL/L — SIGNIFICANT CHANGE UP (ref 7–14)
ANION GAP SERPL CALC-SCNC: 15 MMOL/L — HIGH (ref 7–14)
ANION GAP SERPL CALC-SCNC: 7 MMOL/L — SIGNIFICANT CHANGE UP (ref 7–14)
ANION GAP SERPL CALC-SCNC: 7 MMOL/L — SIGNIFICANT CHANGE UP (ref 7–14)
ANION GAP SERPL CALC-SCNC: 8 MMOL/L — SIGNIFICANT CHANGE UP (ref 7–14)
ANION GAP SERPL CALC-SCNC: 8 MMOL/L — SIGNIFICANT CHANGE UP (ref 7–14)
ANION GAP SERPL CALC-SCNC: 9 MMOL/L — SIGNIFICANT CHANGE UP (ref 7–14)
APPEARANCE UR: CLEAR — SIGNIFICANT CHANGE UP
APPEARANCE UR: CLEAR — SIGNIFICANT CHANGE UP
APTT BLD: 27.4 SEC — SIGNIFICANT CHANGE UP (ref 27–39.2)
APTT BLD: 27.4 SEC — SIGNIFICANT CHANGE UP (ref 27–39.2)
APTT BLD: 31.3 SEC — SIGNIFICANT CHANGE UP (ref 27–39.2)
APTT BLD: 31.3 SEC — SIGNIFICANT CHANGE UP (ref 27–39.2)
AST SERPL-CCNC: 16 U/L — SIGNIFICANT CHANGE UP (ref 0–41)
AST SERPL-CCNC: 16 U/L — SIGNIFICANT CHANGE UP (ref 0–41)
AST SERPL-CCNC: 17 U/L — SIGNIFICANT CHANGE UP (ref 0–41)
AST SERPL-CCNC: 17 U/L — SIGNIFICANT CHANGE UP (ref 0–41)
AST SERPL-CCNC: 18 U/L — SIGNIFICANT CHANGE UP (ref 0–41)
AST SERPL-CCNC: 18 U/L — SIGNIFICANT CHANGE UP (ref 0–41)
AST SERPL-CCNC: 19 U/L — SIGNIFICANT CHANGE UP (ref 0–41)
AST SERPL-CCNC: 21 U/L — SIGNIFICANT CHANGE UP (ref 0–41)
AST SERPL-CCNC: 23 U/L — SIGNIFICANT CHANGE UP (ref 0–41)
AST SERPL-CCNC: 23 U/L — SIGNIFICANT CHANGE UP (ref 0–41)
AST SERPL-CCNC: 25 U/L — SIGNIFICANT CHANGE UP (ref 0–41)
AST SERPL-CCNC: 25 U/L — SIGNIFICANT CHANGE UP (ref 0–41)
AST SERPL-CCNC: 40 U/L — SIGNIFICANT CHANGE UP (ref 0–41)
AST SERPL-CCNC: 40 U/L — SIGNIFICANT CHANGE UP (ref 0–41)
AST SERPL-CCNC: 56 U/L — HIGH (ref 0–41)
AST SERPL-CCNC: 56 U/L — HIGH (ref 0–41)
AST SERPL-CCNC: 70 U/L — HIGH (ref 0–41)
AST SERPL-CCNC: 70 U/L — HIGH (ref 0–41)
B-GLOBULIN 24H MFR UR ELPH: 65.1 % — SIGNIFICANT CHANGE UP
B-GLOBULIN 24H MFR UR ELPH: 65.1 % — SIGNIFICANT CHANGE UP
B-GLOBULIN SERPL ELPH-MCNC: 0.6 G/DL — SIGNIFICANT CHANGE UP (ref 0.5–1)
B2 MICROGLOB SERPL-MCNC: 11.7 MG/L — HIGH (ref 0.8–2.2)
B2 MICROGLOB SERPL-MCNC: 11.7 MG/L — HIGH (ref 0.8–2.2)
BACTERIA # UR AUTO: NEGATIVE /HPF — SIGNIFICANT CHANGE UP
BACTERIA # UR AUTO: NEGATIVE /HPF — SIGNIFICANT CHANGE UP
BASE EXCESS BLDV CALC-SCNC: -11.4 MMOL/L — LOW (ref -2–3)
BASE EXCESS BLDV CALC-SCNC: -11.4 MMOL/L — LOW (ref -2–3)
BASE EXCESS BLDV CALC-SCNC: 0.4 MMOL/L — SIGNIFICANT CHANGE UP (ref -2–3)
BASOPHILS # BLD AUTO: 0.02 K/UL — SIGNIFICANT CHANGE UP (ref 0–0.2)
BASOPHILS # BLD AUTO: 0.03 K/UL — SIGNIFICANT CHANGE UP (ref 0–0.2)
BASOPHILS # BLD AUTO: 0.04 K/UL — SIGNIFICANT CHANGE UP (ref 0–0.2)
BASOPHILS # BLD AUTO: 0.04 K/UL — SIGNIFICANT CHANGE UP (ref 0–0.2)
BASOPHILS # BLD AUTO: 0.05 K/UL — SIGNIFICANT CHANGE UP (ref 0–0.2)
BASOPHILS # BLD AUTO: 0.05 K/UL — SIGNIFICANT CHANGE UP (ref 0–0.2)
BASOPHILS NFR BLD AUTO: 0.2 % — SIGNIFICANT CHANGE UP (ref 0–1)
BASOPHILS NFR BLD AUTO: 0.3 % — SIGNIFICANT CHANGE UP (ref 0–1)
BASOPHILS NFR BLD AUTO: 0.4 % — SIGNIFICANT CHANGE UP (ref 0–1)
BASOPHILS NFR BLD AUTO: 0.5 % — SIGNIFICANT CHANGE UP (ref 0–1)
BILIRUB SERPL-MCNC: 0.2 MG/DL — SIGNIFICANT CHANGE UP (ref 0.2–1.2)
BILIRUB SERPL-MCNC: 0.3 MG/DL — SIGNIFICANT CHANGE UP (ref 0.2–1.2)
BILIRUB SERPL-MCNC: 0.4 MG/DL — SIGNIFICANT CHANGE UP (ref 0.2–1.2)
BILIRUB SERPL-MCNC: 0.4 MG/DL — SIGNIFICANT CHANGE UP (ref 0.2–1.2)
BILIRUB UR-MCNC: NEGATIVE — SIGNIFICANT CHANGE UP
BILIRUB UR-MCNC: NEGATIVE — SIGNIFICANT CHANGE UP
BLD GP AB SCN SERPL QL: SIGNIFICANT CHANGE UP
BUN SERPL-MCNC: 20 MG/DL — SIGNIFICANT CHANGE UP (ref 10–20)
BUN SERPL-MCNC: 21 MG/DL — HIGH (ref 10–20)
BUN SERPL-MCNC: 22 MG/DL — HIGH (ref 10–20)
BUN SERPL-MCNC: 22 MG/DL — HIGH (ref 10–20)
BUN SERPL-MCNC: 23 MG/DL — HIGH (ref 10–20)
BUN SERPL-MCNC: 26 MG/DL — HIGH (ref 10–20)
BUN SERPL-MCNC: 26 MG/DL — HIGH (ref 10–20)
BUN SERPL-MCNC: 27 MG/DL — HIGH (ref 10–20)
BUN SERPL-MCNC: 27 MG/DL — HIGH (ref 10–20)
BUN SERPL-MCNC: 31 MG/DL — HIGH (ref 10–20)
BUN SERPL-MCNC: 31 MG/DL — HIGH (ref 10–20)
BUN SERPL-MCNC: 45 MG/DL — HIGH (ref 10–20)
BUN SERPL-MCNC: 45 MG/DL — HIGH (ref 10–20)
BUN SERPL-MCNC: 47 MG/DL — HIGH (ref 10–20)
BUN SERPL-MCNC: 47 MG/DL — HIGH (ref 10–20)
BUN SERPL-MCNC: 49 MG/DL — HIGH (ref 10–20)
BUN SERPL-MCNC: 49 MG/DL — HIGH (ref 10–20)
BUN SERPL-MCNC: 55 MG/DL — HIGH (ref 10–20)
BUN SERPL-MCNC: 55 MG/DL — HIGH (ref 10–20)
CA-I SERPL-SCNC: 0.77 MMOL/L — LOW (ref 1.15–1.33)
CA-I SERPL-SCNC: 0.77 MMOL/L — LOW (ref 1.15–1.33)
CA-I SERPL-SCNC: 1.41 MMOL/L — HIGH (ref 1.15–1.33)
CALCIUM SERPL-MCNC: 10 MG/DL — SIGNIFICANT CHANGE UP (ref 8.4–10.5)
CALCIUM SERPL-MCNC: 10 MG/DL — SIGNIFICANT CHANGE UP (ref 8.4–10.5)
CALCIUM SERPL-MCNC: 10.4 MG/DL — SIGNIFICANT CHANGE UP (ref 8.4–10.5)
CALCIUM SERPL-MCNC: 10.8 MG/DL — HIGH (ref 8.4–10.5)
CALCIUM SERPL-MCNC: 11 MG/DL — HIGH (ref 8.4–10.5)
CALCIUM SERPL-MCNC: 12 MG/DL — HIGH (ref 8.4–10.4)
CALCIUM SERPL-MCNC: 12 MG/DL — HIGH (ref 8.4–10.4)
CALCIUM SERPL-MCNC: 6.6 MG/DL — LOW (ref 8.4–10.5)
CALCIUM SERPL-MCNC: 6.8 MG/DL — LOW (ref 8.4–10.5)
CALCIUM SERPL-MCNC: 6.8 MG/DL — LOW (ref 8.4–10.5)
CALCIUM SERPL-MCNC: 6.9 MG/DL — LOW (ref 8.4–10.4)
CALCIUM SERPL-MCNC: 6.9 MG/DL — LOW (ref 8.4–10.4)
CALCIUM SERPL-MCNC: 9.6 MG/DL — SIGNIFICANT CHANGE UP (ref 8.4–10.5)
CALCIUM SERPL-MCNC: 9.6 MG/DL — SIGNIFICANT CHANGE UP (ref 8.4–10.5)
CALCIUM SERPL-MCNC: 9.7 MG/DL — SIGNIFICANT CHANGE UP (ref 8.4–10.5)
CALCIUM SERPL-MCNC: 9.7 MG/DL — SIGNIFICANT CHANGE UP (ref 8.4–10.5)
CALCIUM SERPL-MCNC: 9.8 MG/DL — SIGNIFICANT CHANGE UP (ref 8.4–10.5)
CALCIUM SERPL-MCNC: 9.9 MG/DL — SIGNIFICANT CHANGE UP (ref 8.4–10.5)
CALCIUM SERPL-MCNC: 9.9 MG/DL — SIGNIFICANT CHANGE UP (ref 8.4–10.5)
CAST: 3 /LPF — SIGNIFICANT CHANGE UP (ref 0–4)
CAST: 3 /LPF — SIGNIFICANT CHANGE UP (ref 0–4)
CHLORIDE SERPL-SCNC: 100 MMOL/L — SIGNIFICANT CHANGE UP (ref 98–110)
CHLORIDE SERPL-SCNC: 100 MMOL/L — SIGNIFICANT CHANGE UP (ref 98–110)
CHLORIDE SERPL-SCNC: 101 MMOL/L — SIGNIFICANT CHANGE UP (ref 98–110)
CHLORIDE SERPL-SCNC: 102 MMOL/L — SIGNIFICANT CHANGE UP (ref 98–110)
CHLORIDE SERPL-SCNC: 102 MMOL/L — SIGNIFICANT CHANGE UP (ref 98–110)
CHLORIDE SERPL-SCNC: 103 MMOL/L — SIGNIFICANT CHANGE UP (ref 98–110)
CHLORIDE SERPL-SCNC: 103 MMOL/L — SIGNIFICANT CHANGE UP (ref 98–110)
CHLORIDE SERPL-SCNC: 104 MMOL/L — SIGNIFICANT CHANGE UP (ref 98–110)
CHLORIDE SERPL-SCNC: 104 MMOL/L — SIGNIFICANT CHANGE UP (ref 98–110)
CHLORIDE SERPL-SCNC: 93 MMOL/L — LOW (ref 98–110)
CHLORIDE SERPL-SCNC: 93 MMOL/L — LOW (ref 98–110)
CHLORIDE SERPL-SCNC: 94 MMOL/L — LOW (ref 98–110)
CHLORIDE SERPL-SCNC: 94 MMOL/L — LOW (ref 98–110)
CHLORIDE SERPL-SCNC: 98 MMOL/L — SIGNIFICANT CHANGE UP (ref 98–110)
CHLORIDE SERPL-SCNC: 99 MMOL/L — SIGNIFICANT CHANGE UP (ref 98–110)
CHOLEST SERPL-MCNC: 123 MG/DL — SIGNIFICANT CHANGE UP
CHOLEST SERPL-MCNC: 123 MG/DL — SIGNIFICANT CHANGE UP
CO2 SERPL-SCNC: 19 MMOL/L — SIGNIFICANT CHANGE UP (ref 17–32)
CO2 SERPL-SCNC: 19 MMOL/L — SIGNIFICANT CHANGE UP (ref 17–32)
CO2 SERPL-SCNC: 20 MMOL/L — SIGNIFICANT CHANGE UP (ref 17–32)
CO2 SERPL-SCNC: 20 MMOL/L — SIGNIFICANT CHANGE UP (ref 17–32)
CO2 SERPL-SCNC: 23 MMOL/L — SIGNIFICANT CHANGE UP (ref 17–32)
CO2 SERPL-SCNC: 24 MMOL/L — SIGNIFICANT CHANGE UP (ref 17–32)
CO2 SERPL-SCNC: 25 MMOL/L — SIGNIFICANT CHANGE UP (ref 17–32)
CO2 SERPL-SCNC: 27 MMOL/L — SIGNIFICANT CHANGE UP (ref 17–32)
CO2 SERPL-SCNC: 27 MMOL/L — SIGNIFICANT CHANGE UP (ref 17–32)
CO2 SERPL-SCNC: 28 MMOL/L — SIGNIFICANT CHANGE UP (ref 17–32)
CO2 SERPL-SCNC: 28 MMOL/L — SIGNIFICANT CHANGE UP (ref 17–32)
CO2 SERPL-SCNC: 30 MMOL/L — SIGNIFICANT CHANGE UP (ref 17–32)
CO2 SERPL-SCNC: 30 MMOL/L — SIGNIFICANT CHANGE UP (ref 17–32)
COLOR SPEC: YELLOW — SIGNIFICANT CHANGE UP
COLOR SPEC: YELLOW — SIGNIFICANT CHANGE UP
CREAT ?TM UR-MCNC: 83 MG/DL — SIGNIFICANT CHANGE UP
CREAT ?TM UR-MCNC: 83 MG/DL — SIGNIFICANT CHANGE UP
CREAT SERPL-MCNC: 2 MG/DL — HIGH (ref 0.7–1.5)
CREAT SERPL-MCNC: 2 MG/DL — HIGH (ref 0.7–1.5)
CREAT SERPL-MCNC: 2.1 MG/DL — HIGH (ref 0.7–1.5)
CREAT SERPL-MCNC: 2.2 MG/DL — HIGH (ref 0.7–1.5)
CREAT SERPL-MCNC: 2.3 MG/DL — HIGH (ref 0.7–1.5)
CREAT SERPL-MCNC: 2.4 MG/DL — HIGH (ref 0.7–1.5)
CREAT SERPL-MCNC: 2.4 MG/DL — HIGH (ref 0.7–1.5)
CREAT SERPL-MCNC: 2.7 MG/DL — HIGH (ref 0.7–1.5)
CREAT SERPL-MCNC: 2.8 MG/DL — HIGH (ref 0.7–1.5)
CREAT SERPL-MCNC: 2.8 MG/DL — HIGH (ref 0.7–1.5)
CREAT SERPL-MCNC: 3.3 MG/DL — HIGH (ref 0.7–1.5)
CREAT SERPL-MCNC: 3.3 MG/DL — HIGH (ref 0.7–1.5)
D DIMER BLD IA.RAPID-MCNC: 286 NG/ML DDU — HIGH
D DIMER BLD IA.RAPID-MCNC: 286 NG/ML DDU — HIGH
D DIMER BLD IA.RAPID-MCNC: <150 NG/ML DDU — SIGNIFICANT CHANGE UP
D DIMER BLD IA.RAPID-MCNC: <150 NG/ML DDU — SIGNIFICANT CHANGE UP
DIFF PNL FLD: ABNORMAL
DIFF PNL FLD: ABNORMAL
EGFR: 21 ML/MIN/1.73M2 — LOW
EGFR: 21 ML/MIN/1.73M2 — LOW
EGFR: 25 ML/MIN/1.73M2 — LOW
EGFR: 25 ML/MIN/1.73M2 — LOW
EGFR: 26 ML/MIN/1.73M2 — LOW
EGFR: 30 ML/MIN/1.73M2 — LOW
EGFR: 30 ML/MIN/1.73M2 — LOW
EGFR: 32 ML/MIN/1.73M2 — LOW
EGFR: 34 ML/MIN/1.73M2 — LOW
EGFR: 36 ML/MIN/1.73M2 — LOW
EGFR: 38 ML/MIN/1.73M2 — LOW
EGFR: 38 ML/MIN/1.73M2 — LOW
EOSINOPHIL # BLD AUTO: 0 K/UL — SIGNIFICANT CHANGE UP (ref 0–0.7)
EOSINOPHIL # BLD AUTO: 0 K/UL — SIGNIFICANT CHANGE UP (ref 0–0.7)
EOSINOPHIL # BLD AUTO: 0.08 K/UL — SIGNIFICANT CHANGE UP (ref 0–0.7)
EOSINOPHIL # BLD AUTO: 0.1 K/UL — SIGNIFICANT CHANGE UP (ref 0–0.7)
EOSINOPHIL # BLD AUTO: 0.1 K/UL — SIGNIFICANT CHANGE UP (ref 0–0.7)
EOSINOPHIL # BLD AUTO: 0.43 K/UL — SIGNIFICANT CHANGE UP (ref 0–0.7)
EOSINOPHIL # BLD AUTO: 0.43 K/UL — SIGNIFICANT CHANGE UP (ref 0–0.7)
EOSINOPHIL # BLD AUTO: 0.55 K/UL — SIGNIFICANT CHANGE UP (ref 0–0.7)
EOSINOPHIL # BLD AUTO: 0.55 K/UL — SIGNIFICANT CHANGE UP (ref 0–0.7)
EOSINOPHIL # BLD AUTO: 0.56 K/UL — SIGNIFICANT CHANGE UP (ref 0–0.7)
EOSINOPHIL # BLD AUTO: 0.56 K/UL — SIGNIFICANT CHANGE UP (ref 0–0.7)
EOSINOPHIL # BLD AUTO: 0.58 K/UL — SIGNIFICANT CHANGE UP (ref 0–0.7)
EOSINOPHIL # BLD AUTO: 0.58 K/UL — SIGNIFICANT CHANGE UP (ref 0–0.7)
EOSINOPHIL # BLD AUTO: 0.59 K/UL — SIGNIFICANT CHANGE UP (ref 0–0.7)
EOSINOPHIL # BLD AUTO: 0.59 K/UL — SIGNIFICANT CHANGE UP (ref 0–0.7)
EOSINOPHIL # BLD AUTO: 0.6 K/UL — SIGNIFICANT CHANGE UP (ref 0–0.7)
EOSINOPHIL # BLD AUTO: 0.6 K/UL — SIGNIFICANT CHANGE UP (ref 0–0.7)
EOSINOPHIL # BLD AUTO: 0.65 K/UL — SIGNIFICANT CHANGE UP (ref 0–0.7)
EOSINOPHIL # BLD AUTO: 0.65 K/UL — SIGNIFICANT CHANGE UP (ref 0–0.7)
EOSINOPHIL NFR BLD AUTO: 0 % — SIGNIFICANT CHANGE UP (ref 0–8)
EOSINOPHIL NFR BLD AUTO: 0 % — SIGNIFICANT CHANGE UP (ref 0–8)
EOSINOPHIL NFR BLD AUTO: 0.7 % — SIGNIFICANT CHANGE UP (ref 0–8)
EOSINOPHIL NFR BLD AUTO: 0.8 % — SIGNIFICANT CHANGE UP (ref 0–8)
EOSINOPHIL NFR BLD AUTO: 0.8 % — SIGNIFICANT CHANGE UP (ref 0–8)
EOSINOPHIL NFR BLD AUTO: 4 % — SIGNIFICANT CHANGE UP (ref 0–8)
EOSINOPHIL NFR BLD AUTO: 4 % — SIGNIFICANT CHANGE UP (ref 0–8)
EOSINOPHIL NFR BLD AUTO: 5.1 % — SIGNIFICANT CHANGE UP (ref 0–8)
EOSINOPHIL NFR BLD AUTO: 5.1 % — SIGNIFICANT CHANGE UP (ref 0–8)
EOSINOPHIL NFR BLD AUTO: 6.9 % — SIGNIFICANT CHANGE UP (ref 0–8)
EOSINOPHIL NFR BLD AUTO: 7 % — SIGNIFICANT CHANGE UP (ref 0–8)
EOSINOPHIL NFR BLD AUTO: 7 % — SIGNIFICANT CHANGE UP (ref 0–8)
EOSINOPHIL NFR BLD AUTO: 7.5 % — SIGNIFICANT CHANGE UP (ref 0–8)
EOSINOPHIL NFR BLD AUTO: 7.5 % — SIGNIFICANT CHANGE UP (ref 0–8)
EOSINOPHIL NFR BLD AUTO: 8.2 % — HIGH (ref 0–8)
EOSINOPHIL NFR BLD AUTO: 8.2 % — HIGH (ref 0–8)
ESTIMATED AVERAGE GLUCOSE: 103 MG/DL — SIGNIFICANT CHANGE UP (ref 68–114)
ESTIMATED AVERAGE GLUCOSE: 103 MG/DL — SIGNIFICANT CHANGE UP (ref 68–114)
FERRITIN SERPL-MCNC: 528 NG/ML — HIGH (ref 30–400)
FERRITIN SERPL-MCNC: 528 NG/ML — HIGH (ref 30–400)
FOLATE SERPL-MCNC: 3.3 NG/ML — LOW
FOLATE SERPL-MCNC: 3.3 NG/ML — LOW
GAMMA GLOBULIN: 1.4 G/DL — SIGNIFICANT CHANGE UP (ref 0.6–1.6)
GAS PNL BLDA: SIGNIFICANT CHANGE UP
GAS PNL BLDV: 132 MMOL/L — LOW (ref 136–145)
GAS PNL BLDV: SIGNIFICANT CHANGE UP
GLUCOSE BLDC GLUCOMTR-MCNC: 111 MG/DL — HIGH (ref 70–99)
GLUCOSE BLDC GLUCOMTR-MCNC: 111 MG/DL — HIGH (ref 70–99)
GLUCOSE BLDC GLUCOMTR-MCNC: 112 MG/DL — HIGH (ref 70–99)
GLUCOSE BLDC GLUCOMTR-MCNC: 112 MG/DL — HIGH (ref 70–99)
GLUCOSE BLDC GLUCOMTR-MCNC: 126 MG/DL — HIGH (ref 70–99)
GLUCOSE BLDC GLUCOMTR-MCNC: 126 MG/DL — HIGH (ref 70–99)
GLUCOSE BLDC GLUCOMTR-MCNC: 162 MG/DL — HIGH (ref 70–99)
GLUCOSE BLDC GLUCOMTR-MCNC: 162 MG/DL — HIGH (ref 70–99)
GLUCOSE SERPL-MCNC: 105 MG/DL — HIGH (ref 70–99)
GLUCOSE SERPL-MCNC: 105 MG/DL — HIGH (ref 70–99)
GLUCOSE SERPL-MCNC: 108 MG/DL — HIGH (ref 70–99)
GLUCOSE SERPL-MCNC: 108 MG/DL — HIGH (ref 70–99)
GLUCOSE SERPL-MCNC: 113 MG/DL — HIGH (ref 70–99)
GLUCOSE SERPL-MCNC: 113 MG/DL — HIGH (ref 70–99)
GLUCOSE SERPL-MCNC: 126 MG/DL — HIGH (ref 70–99)
GLUCOSE SERPL-MCNC: 126 MG/DL — HIGH (ref 70–99)
GLUCOSE SERPL-MCNC: 51 MG/DL — CRITICAL LOW (ref 70–99)
GLUCOSE SERPL-MCNC: 51 MG/DL — CRITICAL LOW (ref 70–99)
GLUCOSE SERPL-MCNC: 70 MG/DL — SIGNIFICANT CHANGE UP (ref 70–99)
GLUCOSE SERPL-MCNC: 70 MG/DL — SIGNIFICANT CHANGE UP (ref 70–99)
GLUCOSE SERPL-MCNC: 74 MG/DL — SIGNIFICANT CHANGE UP (ref 70–99)
GLUCOSE SERPL-MCNC: 75 MG/DL — SIGNIFICANT CHANGE UP (ref 70–99)
GLUCOSE SERPL-MCNC: 75 MG/DL — SIGNIFICANT CHANGE UP (ref 70–99)
GLUCOSE SERPL-MCNC: 77 MG/DL — SIGNIFICANT CHANGE UP (ref 70–99)
GLUCOSE SERPL-MCNC: 77 MG/DL — SIGNIFICANT CHANGE UP (ref 70–99)
GLUCOSE SERPL-MCNC: 80 MG/DL — SIGNIFICANT CHANGE UP (ref 70–99)
GLUCOSE SERPL-MCNC: 83 MG/DL — SIGNIFICANT CHANGE UP (ref 70–99)
GLUCOSE SERPL-MCNC: 83 MG/DL — SIGNIFICANT CHANGE UP (ref 70–99)
GLUCOSE SERPL-MCNC: 85 MG/DL — SIGNIFICANT CHANGE UP (ref 70–99)
GLUCOSE SERPL-MCNC: 85 MG/DL — SIGNIFICANT CHANGE UP (ref 70–99)
GLUCOSE SERPL-MCNC: 99 MG/DL — SIGNIFICANT CHANGE UP (ref 70–99)
GLUCOSE SERPL-MCNC: 99 MG/DL — SIGNIFICANT CHANGE UP (ref 70–99)
GLUCOSE UR QL: NEGATIVE MG/DL — SIGNIFICANT CHANGE UP
GLUCOSE UR QL: NEGATIVE MG/DL — SIGNIFICANT CHANGE UP
HCO3 BLDV-SCNC: 16 MMOL/L — LOW (ref 22–29)
HCO3 BLDV-SCNC: 16 MMOL/L — LOW (ref 22–29)
HCO3 BLDV-SCNC: 26 MMOL/L — SIGNIFICANT CHANGE UP (ref 22–29)
HCT VFR BLD CALC: 26.9 % — LOW (ref 42–52)
HCT VFR BLD CALC: 26.9 % — LOW (ref 42–52)
HCT VFR BLD CALC: 27.2 % — LOW (ref 42–52)
HCT VFR BLD CALC: 27.2 % — LOW (ref 42–52)
HCT VFR BLD CALC: 27.8 % — LOW (ref 42–52)
HCT VFR BLD CALC: 27.8 % — LOW (ref 42–52)
HCT VFR BLD CALC: 28.7 % — LOW (ref 42–52)
HCT VFR BLD CALC: 28.7 % — LOW (ref 42–52)
HCT VFR BLD CALC: 28.9 % — LOW (ref 42–52)
HCT VFR BLD CALC: 28.9 % — LOW (ref 42–52)
HCT VFR BLD CALC: 29.3 % — LOW (ref 42–52)
HCT VFR BLD CALC: 29.3 % — LOW (ref 42–52)
HCT VFR BLD CALC: 30.5 % — LOW (ref 42–52)
HCT VFR BLD CALC: 30.6 % — LOW (ref 42–52)
HCT VFR BLD CALC: 30.6 % — LOW (ref 42–52)
HCT VFR BLD CALC: 31.2 % — LOW (ref 42–52)
HCT VFR BLD CALC: 31.6 % — LOW (ref 42–52)
HCT VFR BLD CALC: 32.7 % — LOW (ref 42–52)
HCT VFR BLD CALC: 32.7 % — LOW (ref 42–52)
HCT VFR BLDA CALC: 26 % — LOW (ref 39–51)
HCT VFR BLDA CALC: 26 % — LOW (ref 39–51)
HCT VFR BLDA CALC: 33 % — LOW (ref 39–51)
HCV AB S/CO SERPL IA: 0.04 COI — SIGNIFICANT CHANGE UP
HCV AB S/CO SERPL IA: 0.04 COI — SIGNIFICANT CHANGE UP
HCV AB SERPL-IMP: SIGNIFICANT CHANGE UP
HCV AB SERPL-IMP: SIGNIFICANT CHANGE UP
HDLC SERPL-MCNC: 34 MG/DL — LOW
HDLC SERPL-MCNC: 34 MG/DL — LOW
HGB BLD CALC-MCNC: 11 G/DL — LOW (ref 12.6–17.4)
HGB BLD CALC-MCNC: 8.6 G/DL — LOW (ref 12.6–17.4)
HGB BLD CALC-MCNC: 8.6 G/DL — LOW (ref 12.6–17.4)
HGB BLD-MCNC: 10.1 G/DL — LOW (ref 14–18)
HGB BLD-MCNC: 10.1 G/DL — LOW (ref 14–18)
HGB BLD-MCNC: 10.2 G/DL — LOW (ref 14–18)
HGB BLD-MCNC: 10.2 G/DL — LOW (ref 14–18)
HGB BLD-MCNC: 10.3 G/DL — LOW (ref 14–18)
HGB BLD-MCNC: 10.3 G/DL — LOW (ref 14–18)
HGB BLD-MCNC: 10.4 G/DL — LOW (ref 14–18)
HGB BLD-MCNC: 10.4 G/DL — LOW (ref 14–18)
HGB BLD-MCNC: 10.5 G/DL — LOW (ref 14–18)
HGB BLD-MCNC: 10.5 G/DL — LOW (ref 14–18)
HGB BLD-MCNC: 10.6 G/DL — LOW (ref 14–18)
HGB BLD-MCNC: 10.7 G/DL — LOW (ref 14–18)
HGB BLD-MCNC: 10.7 G/DL — LOW (ref 14–18)
HGB BLD-MCNC: 9 G/DL — LOW (ref 14–18)
HGB BLD-MCNC: 9 G/DL — LOW (ref 14–18)
HGB BLD-MCNC: 9.1 G/DL — LOW (ref 14–18)
HGB BLD-MCNC: 9.1 G/DL — LOW (ref 14–18)
HGB BLD-MCNC: 9.2 G/DL — LOW (ref 14–18)
HGB BLD-MCNC: 9.2 G/DL — LOW (ref 14–18)
HGB BLD-MCNC: 9.4 G/DL — LOW (ref 14–18)
HGB BLD-MCNC: 9.4 G/DL — LOW (ref 14–18)
HGB BLD-MCNC: 9.5 G/DL — LOW (ref 14–18)
HGB BLD-MCNC: 9.5 G/DL — LOW (ref 14–18)
HGB BLD-MCNC: 9.7 G/DL — LOW (ref 14–18)
HGB BLD-MCNC: 9.7 G/DL — LOW (ref 14–18)
HGB BLD-MCNC: 9.9 G/DL — LOW (ref 14–18)
HGB BLD-MCNC: 9.9 G/DL — LOW (ref 14–18)
IGA FLD-MCNC: 9 MG/DL — LOW (ref 84–499)
IGA FLD-MCNC: 9 MG/DL — LOW (ref 84–499)
IGG FLD-MCNC: 1717 MG/DL — HIGH (ref 610–1660)
IGG FLD-MCNC: 1717 MG/DL — HIGH (ref 610–1660)
IGG4 SER-MCNC: <0.4 MG/DL — LOW (ref 1–123)
IGG4 SER-MCNC: <0.4 MG/DL — LOW (ref 1–123)
IGM SERPL-MCNC: 15 MG/DL — LOW (ref 35–242)
IGM SERPL-MCNC: 15 MG/DL — LOW (ref 35–242)
IMM GRANULOCYTES NFR BLD AUTO: 0.5 % — HIGH (ref 0.1–0.3)
IMM GRANULOCYTES NFR BLD AUTO: 0.5 % — HIGH (ref 0.1–0.3)
IMM GRANULOCYTES NFR BLD AUTO: 0.6 % — HIGH (ref 0.1–0.3)
IMM GRANULOCYTES NFR BLD AUTO: 0.6 % — HIGH (ref 0.1–0.3)
IMM GRANULOCYTES NFR BLD AUTO: 0.7 % — HIGH (ref 0.1–0.3)
IMM GRANULOCYTES NFR BLD AUTO: 1 % — HIGH (ref 0.1–0.3)
IMM GRANULOCYTES NFR BLD AUTO: 1 % — HIGH (ref 0.1–0.3)
IMM GRANULOCYTES NFR BLD AUTO: 1.4 % — HIGH (ref 0.1–0.3)
IMM GRANULOCYTES NFR BLD AUTO: 1.6 % — HIGH (ref 0.1–0.3)
IMM GRANULOCYTES NFR BLD AUTO: 1.6 % — HIGH (ref 0.1–0.3)
IMM GRANULOCYTES NFR BLD AUTO: 1.9 % — HIGH (ref 0.1–0.3)
IMM GRANULOCYTES NFR BLD AUTO: 1.9 % — HIGH (ref 0.1–0.3)
INR BLD: 1.17 RATIO — SIGNIFICANT CHANGE UP (ref 0.65–1.3)
INR BLD: 1.17 RATIO — SIGNIFICANT CHANGE UP (ref 0.65–1.3)
INTERPRETATION 24H UR IFE-IMP: SIGNIFICANT CHANGE UP
INTERPRETATION 24H UR IFE-IMP: SIGNIFICANT CHANGE UP
INTERPRETATION SERPL IFE-IMP: SIGNIFICANT CHANGE UP
IRON SATN MFR SERPL: 40 % — SIGNIFICANT CHANGE UP (ref 15–50)
IRON SATN MFR SERPL: 40 % — SIGNIFICANT CHANGE UP (ref 15–50)
IRON SATN MFR SERPL: 57 UG/DL — SIGNIFICANT CHANGE UP (ref 35–150)
IRON SATN MFR SERPL: 57 UG/DL — SIGNIFICANT CHANGE UP (ref 35–150)
KAPPA LC SER QL IFE: 0.36 MG/DL — SIGNIFICANT CHANGE UP (ref 0.33–1.94)
KAPPA LC SER QL IFE: 0.36 MG/DL — SIGNIFICANT CHANGE UP (ref 0.33–1.94)
KAPPA LC SER QL IFE: 0.42 MG/DL — SIGNIFICANT CHANGE UP (ref 0.33–1.94)
KAPPA LC SER QL IFE: 0.42 MG/DL — SIGNIFICANT CHANGE UP (ref 0.33–1.94)
KAPPA/LAMBDA FREE LIGHT CHAIN RATIO, SERUM: 0 RATIO — LOW (ref 0.26–1.65)
KETONES UR-MCNC: NEGATIVE MG/DL — SIGNIFICANT CHANGE UP
KETONES UR-MCNC: NEGATIVE MG/DL — SIGNIFICANT CHANGE UP
LACTATE BLDV-MCNC: 1.1 MMOL/L — SIGNIFICANT CHANGE UP (ref 0.5–2)
LACTATE BLDV-MCNC: 2.1 MMOL/L — HIGH (ref 0.5–2)
LACTATE BLDV-MCNC: 2.1 MMOL/L — HIGH (ref 0.5–2)
LACTATE SERPL-SCNC: 3.3 MMOL/L — HIGH (ref 0.7–2)
LACTATE SERPL-SCNC: 3.3 MMOL/L — HIGH (ref 0.7–2)
LAMBDA LC SER QL IFE: 766.66 MG/DL — HIGH (ref 0.57–2.63)
LAMBDA LC SER QL IFE: 766.66 MG/DL — HIGH (ref 0.57–2.63)
LAMBDA LC SER QL IFE: 779.62 MG/DL — HIGH (ref 0.57–2.63)
LAMBDA LC SER QL IFE: 779.62 MG/DL — HIGH (ref 0.57–2.63)
LEUKOCYTE ESTERASE UR-ACNC: NEGATIVE — SIGNIFICANT CHANGE UP
LEUKOCYTE ESTERASE UR-ACNC: NEGATIVE — SIGNIFICANT CHANGE UP
LIDOCAIN IGE QN: 25 U/L — SIGNIFICANT CHANGE UP (ref 7–60)
LIPID PNL WITH DIRECT LDL SERPL: 77 MG/DL — SIGNIFICANT CHANGE UP
LIPID PNL WITH DIRECT LDL SERPL: 77 MG/DL — SIGNIFICANT CHANGE UP
LYMPHOCYTES # BLD AUTO: 1.19 K/UL — LOW (ref 1.2–3.4)
LYMPHOCYTES # BLD AUTO: 1.19 K/UL — LOW (ref 1.2–3.4)
LYMPHOCYTES # BLD AUTO: 1.58 K/UL — SIGNIFICANT CHANGE UP (ref 1.2–3.4)
LYMPHOCYTES # BLD AUTO: 1.58 K/UL — SIGNIFICANT CHANGE UP (ref 1.2–3.4)
LYMPHOCYTES # BLD AUTO: 1.7 K/UL — SIGNIFICANT CHANGE UP (ref 1.2–3.4)
LYMPHOCYTES # BLD AUTO: 12.7 % — LOW (ref 20.5–51.1)
LYMPHOCYTES # BLD AUTO: 12.7 % — LOW (ref 20.5–51.1)
LYMPHOCYTES # BLD AUTO: 14.2 % — LOW (ref 20.5–51.1)
LYMPHOCYTES # BLD AUTO: 2.14 K/UL — SIGNIFICANT CHANGE UP (ref 1.2–3.4)
LYMPHOCYTES # BLD AUTO: 2.14 K/UL — SIGNIFICANT CHANGE UP (ref 1.2–3.4)
LYMPHOCYTES # BLD AUTO: 2.31 K/UL — SIGNIFICANT CHANGE UP (ref 1.2–3.4)
LYMPHOCYTES # BLD AUTO: 2.31 K/UL — SIGNIFICANT CHANGE UP (ref 1.2–3.4)
LYMPHOCYTES # BLD AUTO: 2.32 K/UL — SIGNIFICANT CHANGE UP (ref 1.2–3.4)
LYMPHOCYTES # BLD AUTO: 2.32 K/UL — SIGNIFICANT CHANGE UP (ref 1.2–3.4)
LYMPHOCYTES # BLD AUTO: 2.36 K/UL — SIGNIFICANT CHANGE UP (ref 1.2–3.4)
LYMPHOCYTES # BLD AUTO: 2.36 K/UL — SIGNIFICANT CHANGE UP (ref 1.2–3.4)
LYMPHOCYTES # BLD AUTO: 2.41 K/UL — SIGNIFICANT CHANGE UP (ref 1.2–3.4)
LYMPHOCYTES # BLD AUTO: 2.41 K/UL — SIGNIFICANT CHANGE UP (ref 1.2–3.4)
LYMPHOCYTES # BLD AUTO: 2.44 K/UL — SIGNIFICANT CHANGE UP (ref 1.2–3.4)
LYMPHOCYTES # BLD AUTO: 2.44 K/UL — SIGNIFICANT CHANGE UP (ref 1.2–3.4)
LYMPHOCYTES # BLD AUTO: 2.63 K/UL — SIGNIFICANT CHANGE UP (ref 1.2–3.4)
LYMPHOCYTES # BLD AUTO: 2.63 K/UL — SIGNIFICANT CHANGE UP (ref 1.2–3.4)
LYMPHOCYTES # BLD AUTO: 21.4 % — SIGNIFICANT CHANGE UP (ref 20.5–51.1)
LYMPHOCYTES # BLD AUTO: 21.4 % — SIGNIFICANT CHANGE UP (ref 20.5–51.1)
LYMPHOCYTES # BLD AUTO: 22.4 % — SIGNIFICANT CHANGE UP (ref 20.5–51.1)
LYMPHOCYTES # BLD AUTO: 22.4 % — SIGNIFICANT CHANGE UP (ref 20.5–51.1)
LYMPHOCYTES # BLD AUTO: 27.9 % — SIGNIFICANT CHANGE UP (ref 20.5–51.1)
LYMPHOCYTES # BLD AUTO: 27.9 % — SIGNIFICANT CHANGE UP (ref 20.5–51.1)
LYMPHOCYTES # BLD AUTO: 28 % — SIGNIFICANT CHANGE UP (ref 20.5–51.1)
LYMPHOCYTES # BLD AUTO: 28 % — SIGNIFICANT CHANGE UP (ref 20.5–51.1)
LYMPHOCYTES # BLD AUTO: 28.8 % — SIGNIFICANT CHANGE UP (ref 20.5–51.1)
LYMPHOCYTES # BLD AUTO: 28.8 % — SIGNIFICANT CHANGE UP (ref 20.5–51.1)
LYMPHOCYTES # BLD AUTO: 30.2 % — SIGNIFICANT CHANGE UP (ref 20.5–51.1)
LYMPHOCYTES # BLD AUTO: 30.2 % — SIGNIFICANT CHANGE UP (ref 20.5–51.1)
LYMPHOCYTES # BLD AUTO: 30.3 % — SIGNIFICANT CHANGE UP (ref 20.5–51.1)
LYMPHOCYTES # BLD AUTO: 30.3 % — SIGNIFICANT CHANGE UP (ref 20.5–51.1)
LYMPHOCYTES # BLD AUTO: 6.4 % — LOW (ref 20.5–51.1)
LYMPHOCYTES # BLD AUTO: 6.4 % — LOW (ref 20.5–51.1)
M PROTEIN 24H UR ELPH-MRATE: PRESENT
M PROTEIN 24H UR ELPH-MRATE: PRESENT
M-SPIKE: SIGNIFICANT CHANGE UP (ref 0–0)
MAGNESIUM SERPL-MCNC: 1.7 MG/DL — LOW (ref 1.8–2.4)
MAGNESIUM SERPL-MCNC: 1.7 MG/DL — LOW (ref 1.8–2.4)
MAGNESIUM SERPL-MCNC: 1.8 MG/DL — SIGNIFICANT CHANGE UP (ref 1.8–2.4)
MAGNESIUM SERPL-MCNC: 1.9 MG/DL — SIGNIFICANT CHANGE UP (ref 1.8–2.4)
MAGNESIUM SERPL-MCNC: 2 MG/DL — SIGNIFICANT CHANGE UP (ref 1.8–2.4)
MAGNESIUM SERPL-MCNC: 2.1 MG/DL — SIGNIFICANT CHANGE UP (ref 1.8–2.4)
MAGNESIUM SERPL-MCNC: 2.2 MG/DL — SIGNIFICANT CHANGE UP (ref 1.8–2.4)
MAGNESIUM SERPL-MCNC: 2.2 MG/DL — SIGNIFICANT CHANGE UP (ref 1.8–2.4)
MCHC RBC-ENTMCNC: 30.8 PG — SIGNIFICANT CHANGE UP (ref 27–31)
MCHC RBC-ENTMCNC: 30.8 PG — SIGNIFICANT CHANGE UP (ref 27–31)
MCHC RBC-ENTMCNC: 31.2 PG — HIGH (ref 27–31)
MCHC RBC-ENTMCNC: 31.2 PG — HIGH (ref 27–31)
MCHC RBC-ENTMCNC: 31.6 PG — HIGH (ref 27–31)
MCHC RBC-ENTMCNC: 31.6 PG — HIGH (ref 27–31)
MCHC RBC-ENTMCNC: 31.7 PG — HIGH (ref 27–31)
MCHC RBC-ENTMCNC: 31.8 PG — HIGH (ref 27–31)
MCHC RBC-ENTMCNC: 32.2 PG — HIGH (ref 27–31)
MCHC RBC-ENTMCNC: 32.2 PG — HIGH (ref 27–31)
MCHC RBC-ENTMCNC: 32.4 G/DL — SIGNIFICANT CHANGE UP (ref 32–37)
MCHC RBC-ENTMCNC: 32.4 G/DL — SIGNIFICANT CHANGE UP (ref 32–37)
MCHC RBC-ENTMCNC: 32.4 PG — HIGH (ref 27–31)
MCHC RBC-ENTMCNC: 32.4 PG — HIGH (ref 27–31)
MCHC RBC-ENTMCNC: 32.5 G/DL — SIGNIFICANT CHANGE UP (ref 32–37)
MCHC RBC-ENTMCNC: 32.5 G/DL — SIGNIFICANT CHANGE UP (ref 32–37)
MCHC RBC-ENTMCNC: 32.7 G/DL — SIGNIFICANT CHANGE UP (ref 32–37)
MCHC RBC-ENTMCNC: 32.8 G/DL — SIGNIFICANT CHANGE UP (ref 32–37)
MCHC RBC-ENTMCNC: 32.8 G/DL — SIGNIFICANT CHANGE UP (ref 32–37)
MCHC RBC-ENTMCNC: 33.1 G/DL — SIGNIFICANT CHANGE UP (ref 32–37)
MCHC RBC-ENTMCNC: 33.1 G/DL — SIGNIFICANT CHANGE UP (ref 32–37)
MCHC RBC-ENTMCNC: 33.2 G/DL — SIGNIFICANT CHANGE UP (ref 32–37)
MCHC RBC-ENTMCNC: 33.2 G/DL — SIGNIFICANT CHANGE UP (ref 32–37)
MCHC RBC-ENTMCNC: 33.3 G/DL — SIGNIFICANT CHANGE UP (ref 32–37)
MCHC RBC-ENTMCNC: 33.3 G/DL — SIGNIFICANT CHANGE UP (ref 32–37)
MCHC RBC-ENTMCNC: 33.5 G/DL — SIGNIFICANT CHANGE UP (ref 32–37)
MCHC RBC-ENTMCNC: 33.6 G/DL — SIGNIFICANT CHANGE UP (ref 32–37)
MCHC RBC-ENTMCNC: 33.6 G/DL — SIGNIFICANT CHANGE UP (ref 32–37)
MCHC RBC-ENTMCNC: 33.7 G/DL — SIGNIFICANT CHANGE UP (ref 32–37)
MCHC RBC-ENTMCNC: 33.7 G/DL — SIGNIFICANT CHANGE UP (ref 32–37)
MCHC RBC-ENTMCNC: 33.8 G/DL — SIGNIFICANT CHANGE UP (ref 32–37)
MCHC RBC-ENTMCNC: 33.8 G/DL — SIGNIFICANT CHANGE UP (ref 32–37)
MCV RBC AUTO: 94.4 FL — HIGH (ref 80–94)
MCV RBC AUTO: 94.4 FL — HIGH (ref 80–94)
MCV RBC AUTO: 94.8 FL — HIGH (ref 80–94)
MCV RBC AUTO: 94.8 FL — HIGH (ref 80–94)
MCV RBC AUTO: 94.9 FL — HIGH (ref 80–94)
MCV RBC AUTO: 95 FL — HIGH (ref 80–94)
MCV RBC AUTO: 95 FL — HIGH (ref 80–94)
MCV RBC AUTO: 95.1 FL — HIGH (ref 80–94)
MCV RBC AUTO: 95.1 FL — HIGH (ref 80–94)
MCV RBC AUTO: 95.3 FL — HIGH (ref 80–94)
MCV RBC AUTO: 95.3 FL — HIGH (ref 80–94)
MCV RBC AUTO: 95.6 FL — HIGH (ref 80–94)
MCV RBC AUTO: 95.6 FL — HIGH (ref 80–94)
MCV RBC AUTO: 95.8 FL — HIGH (ref 80–94)
MCV RBC AUTO: 95.9 FL — HIGH (ref 80–94)
MCV RBC AUTO: 95.9 FL — HIGH (ref 80–94)
MCV RBC AUTO: 96.9 FL — HIGH (ref 80–94)
MCV RBC AUTO: 96.9 FL — HIGH (ref 80–94)
MCV RBC AUTO: 97.2 FL — HIGH (ref 80–94)
MCV RBC AUTO: 97.2 FL — HIGH (ref 80–94)
MCV RBC AUTO: 97.3 FL — HIGH (ref 80–94)
MCV RBC AUTO: 97.3 FL — HIGH (ref 80–94)
MCV RBC AUTO: 98 FL — HIGH (ref 80–94)
MCV RBC AUTO: 98 FL — HIGH (ref 80–94)
MONOCYTES # BLD AUTO: 0.8 K/UL — HIGH (ref 0.1–0.6)
MONOCYTES # BLD AUTO: 0.8 K/UL — HIGH (ref 0.1–0.6)
MONOCYTES # BLD AUTO: 0.93 K/UL — HIGH (ref 0.1–0.6)
MONOCYTES # BLD AUTO: 0.93 K/UL — HIGH (ref 0.1–0.6)
MONOCYTES # BLD AUTO: 1 K/UL — HIGH (ref 0.1–0.6)
MONOCYTES # BLD AUTO: 1 K/UL — HIGH (ref 0.1–0.6)
MONOCYTES # BLD AUTO: 1.02 K/UL — HIGH (ref 0.1–0.6)
MONOCYTES # BLD AUTO: 1.03 K/UL — HIGH (ref 0.1–0.6)
MONOCYTES # BLD AUTO: 1.03 K/UL — HIGH (ref 0.1–0.6)
MONOCYTES # BLD AUTO: 1.05 K/UL — HIGH (ref 0.1–0.6)
MONOCYTES # BLD AUTO: 1.05 K/UL — HIGH (ref 0.1–0.6)
MONOCYTES # BLD AUTO: 1.09 K/UL — HIGH (ref 0.1–0.6)
MONOCYTES # BLD AUTO: 1.09 K/UL — HIGH (ref 0.1–0.6)
MONOCYTES # BLD AUTO: 1.1 K/UL — HIGH (ref 0.1–0.6)
MONOCYTES # BLD AUTO: 1.1 K/UL — HIGH (ref 0.1–0.6)
MONOCYTES # BLD AUTO: 1.11 K/UL — HIGH (ref 0.1–0.6)
MONOCYTES # BLD AUTO: 1.11 K/UL — HIGH (ref 0.1–0.6)
MONOCYTES # BLD AUTO: 1.26 K/UL — HIGH (ref 0.1–0.6)
MONOCYTES # BLD AUTO: 1.26 K/UL — HIGH (ref 0.1–0.6)
MONOCYTES NFR BLD AUTO: 10.1 % — HIGH (ref 1.7–9.3)
MONOCYTES NFR BLD AUTO: 11.2 % — HIGH (ref 1.7–9.3)
MONOCYTES NFR BLD AUTO: 11.2 % — HIGH (ref 1.7–9.3)
MONOCYTES NFR BLD AUTO: 11.3 % — HIGH (ref 1.7–9.3)
MONOCYTES NFR BLD AUTO: 11.3 % — HIGH (ref 1.7–9.3)
MONOCYTES NFR BLD AUTO: 11.6 % — HIGH (ref 1.7–9.3)
MONOCYTES NFR BLD AUTO: 11.6 % — HIGH (ref 1.7–9.3)
MONOCYTES NFR BLD AUTO: 12.2 % — HIGH (ref 1.7–9.3)
MONOCYTES NFR BLD AUTO: 12.2 % — HIGH (ref 1.7–9.3)
MONOCYTES NFR BLD AUTO: 12.4 % — HIGH (ref 1.7–9.3)
MONOCYTES NFR BLD AUTO: 12.4 % — HIGH (ref 1.7–9.3)
MONOCYTES NFR BLD AUTO: 6.8 % — SIGNIFICANT CHANGE UP (ref 1.7–9.3)
MONOCYTES NFR BLD AUTO: 6.8 % — SIGNIFICANT CHANGE UP (ref 1.7–9.3)
MONOCYTES NFR BLD AUTO: 8.5 % — SIGNIFICANT CHANGE UP (ref 1.7–9.3)
MONOCYTES NFR BLD AUTO: 8.9 % — SIGNIFICANT CHANGE UP (ref 1.7–9.3)
MONOCYTES NFR BLD AUTO: 8.9 % — SIGNIFICANT CHANGE UP (ref 1.7–9.3)
NEUTROPHILS # BLD AUTO: 15.64 K/UL — HIGH (ref 1.4–6.5)
NEUTROPHILS # BLD AUTO: 15.64 K/UL — HIGH (ref 1.4–6.5)
NEUTROPHILS # BLD AUTO: 3.47 K/UL — SIGNIFICANT CHANGE UP (ref 1.4–6.5)
NEUTROPHILS # BLD AUTO: 3.47 K/UL — SIGNIFICANT CHANGE UP (ref 1.4–6.5)
NEUTROPHILS # BLD AUTO: 3.98 K/UL — SIGNIFICANT CHANGE UP (ref 1.4–6.5)
NEUTROPHILS # BLD AUTO: 3.98 K/UL — SIGNIFICANT CHANGE UP (ref 1.4–6.5)
NEUTROPHILS # BLD AUTO: 4.02 K/UL — SIGNIFICANT CHANGE UP (ref 1.4–6.5)
NEUTROPHILS # BLD AUTO: 4.02 K/UL — SIGNIFICANT CHANGE UP (ref 1.4–6.5)
NEUTROPHILS # BLD AUTO: 4.36 K/UL — SIGNIFICANT CHANGE UP (ref 1.4–6.5)
NEUTROPHILS # BLD AUTO: 4.36 K/UL — SIGNIFICANT CHANGE UP (ref 1.4–6.5)
NEUTROPHILS # BLD AUTO: 4.9 K/UL — SIGNIFICANT CHANGE UP (ref 1.4–6.5)
NEUTROPHILS # BLD AUTO: 4.9 K/UL — SIGNIFICANT CHANGE UP (ref 1.4–6.5)
NEUTROPHILS # BLD AUTO: 6.53 K/UL — HIGH (ref 1.4–6.5)
NEUTROPHILS # BLD AUTO: 6.53 K/UL — HIGH (ref 1.4–6.5)
NEUTROPHILS # BLD AUTO: 6.93 K/UL — HIGH (ref 1.4–6.5)
NEUTROPHILS # BLD AUTO: 6.93 K/UL — HIGH (ref 1.4–6.5)
NEUTROPHILS # BLD AUTO: 9.07 K/UL — HIGH (ref 1.4–6.5)
NEUTROPHILS # BLD AUTO: 9.45 K/UL — HIGH (ref 1.4–6.5)
NEUTROPHILS # BLD AUTO: 9.45 K/UL — HIGH (ref 1.4–6.5)
NEUTROPHILS NFR BLD AUTO: 48.9 % — SIGNIFICANT CHANGE UP (ref 42.2–75.2)
NEUTROPHILS NFR BLD AUTO: 48.9 % — SIGNIFICANT CHANGE UP (ref 42.2–75.2)
NEUTROPHILS NFR BLD AUTO: 49.4 % — SIGNIFICANT CHANGE UP (ref 42.2–75.2)
NEUTROPHILS NFR BLD AUTO: 49.4 % — SIGNIFICANT CHANGE UP (ref 42.2–75.2)
NEUTROPHILS NFR BLD AUTO: 50.2 % — SIGNIFICANT CHANGE UP (ref 42.2–75.2)
NEUTROPHILS NFR BLD AUTO: 50.2 % — SIGNIFICANT CHANGE UP (ref 42.2–75.2)
NEUTROPHILS NFR BLD AUTO: 51.7 % — SIGNIFICANT CHANGE UP (ref 42.2–75.2)
NEUTROPHILS NFR BLD AUTO: 51.7 % — SIGNIFICANT CHANGE UP (ref 42.2–75.2)
NEUTROPHILS NFR BLD AUTO: 52.1 % — SIGNIFICANT CHANGE UP (ref 42.2–75.2)
NEUTROPHILS NFR BLD AUTO: 52.1 % — SIGNIFICANT CHANGE UP (ref 42.2–75.2)
NEUTROPHILS NFR BLD AUTO: 60.5 % — SIGNIFICANT CHANGE UP (ref 42.2–75.2)
NEUTROPHILS NFR BLD AUTO: 60.5 % — SIGNIFICANT CHANGE UP (ref 42.2–75.2)
NEUTROPHILS NFR BLD AUTO: 63.7 % — SIGNIFICANT CHANGE UP (ref 42.2–75.2)
NEUTROPHILS NFR BLD AUTO: 63.7 % — SIGNIFICANT CHANGE UP (ref 42.2–75.2)
NEUTROPHILS NFR BLD AUTO: 75.7 % — HIGH (ref 42.2–75.2)
NEUTROPHILS NFR BLD AUTO: 76 % — HIGH (ref 42.2–75.2)
NEUTROPHILS NFR BLD AUTO: 76 % — HIGH (ref 42.2–75.2)
NEUTROPHILS NFR BLD AUTO: 84.7 % — HIGH (ref 42.2–75.2)
NEUTROPHILS NFR BLD AUTO: 84.7 % — HIGH (ref 42.2–75.2)
NITRITE UR-MCNC: NEGATIVE — SIGNIFICANT CHANGE UP
NITRITE UR-MCNC: NEGATIVE — SIGNIFICANT CHANGE UP
NON HDL CHOLESTEROL: 89 MG/DL — SIGNIFICANT CHANGE UP
NON HDL CHOLESTEROL: 89 MG/DL — SIGNIFICANT CHANGE UP
NRBC # BLD: 0 /100 WBCS — SIGNIFICANT CHANGE UP (ref 0–0)
NT-PROBNP SERPL-SCNC: 5119 PG/ML — HIGH (ref 0–300)
PCO2 BLDV: 39 MMHG — LOW (ref 42–55)
PCO2 BLDV: 39 MMHG — LOW (ref 42–55)
PCO2 BLDV: 48 MMHG — SIGNIFICANT CHANGE UP (ref 42–55)
PH BLDV: 7.21 — LOW (ref 7.32–7.43)
PH BLDV: 7.21 — LOW (ref 7.32–7.43)
PH BLDV: 7.35 — SIGNIFICANT CHANGE UP (ref 7.32–7.43)
PH UR: 5.5 — SIGNIFICANT CHANGE UP (ref 5–8)
PH UR: 5.5 — SIGNIFICANT CHANGE UP (ref 5–8)
PHOSPHATE SERPL-MCNC: 3.1 MG/DL — SIGNIFICANT CHANGE UP (ref 2.1–4.9)
PHOSPHATE SERPL-MCNC: 3.1 MG/DL — SIGNIFICANT CHANGE UP (ref 2.1–4.9)
PHOSPHATE SERPL-MCNC: 3.2 MG/DL — SIGNIFICANT CHANGE UP (ref 2.1–4.9)
PHOSPHATE SERPL-MCNC: 3.2 MG/DL — SIGNIFICANT CHANGE UP (ref 2.1–4.9)
PHOSPHATE SERPL-MCNC: 3.6 MG/DL — SIGNIFICANT CHANGE UP (ref 2.1–4.9)
PHOSPHATE SERPL-MCNC: 3.6 MG/DL — SIGNIFICANT CHANGE UP (ref 2.1–4.9)
PHOSPHATE SERPL-MCNC: 3.7 MG/DL — SIGNIFICANT CHANGE UP (ref 2.1–4.9)
PHOSPHATE SERPL-MCNC: 3.8 MG/DL — SIGNIFICANT CHANGE UP (ref 2.1–4.9)
PHOSPHATE SERPL-MCNC: 3.8 MG/DL — SIGNIFICANT CHANGE UP (ref 2.1–4.9)
PHOSPHATE SERPL-MCNC: 6.4 MG/DL — HIGH (ref 2.1–4.9)
PHOSPHATE SERPL-MCNC: 6.4 MG/DL — HIGH (ref 2.1–4.9)
PLATELET # BLD AUTO: 159 K/UL — SIGNIFICANT CHANGE UP (ref 130–400)
PLATELET # BLD AUTO: 159 K/UL — SIGNIFICANT CHANGE UP (ref 130–400)
PLATELET # BLD AUTO: 212 K/UL — SIGNIFICANT CHANGE UP (ref 130–400)
PLATELET # BLD AUTO: 212 K/UL — SIGNIFICANT CHANGE UP (ref 130–400)
PLATELET # BLD AUTO: 230 K/UL — SIGNIFICANT CHANGE UP (ref 130–400)
PLATELET # BLD AUTO: 230 K/UL — SIGNIFICANT CHANGE UP (ref 130–400)
PLATELET # BLD AUTO: 237 K/UL — SIGNIFICANT CHANGE UP (ref 130–400)
PLATELET # BLD AUTO: 237 K/UL — SIGNIFICANT CHANGE UP (ref 130–400)
PLATELET # BLD AUTO: 242 K/UL — SIGNIFICANT CHANGE UP (ref 130–400)
PLATELET # BLD AUTO: 242 K/UL — SIGNIFICANT CHANGE UP (ref 130–400)
PLATELET # BLD AUTO: 248 K/UL — SIGNIFICANT CHANGE UP (ref 130–400)
PLATELET # BLD AUTO: 248 K/UL — SIGNIFICANT CHANGE UP (ref 130–400)
PLATELET # BLD AUTO: 256 K/UL — SIGNIFICANT CHANGE UP (ref 130–400)
PLATELET # BLD AUTO: 256 K/UL — SIGNIFICANT CHANGE UP (ref 130–400)
PLATELET # BLD AUTO: 266 K/UL — SIGNIFICANT CHANGE UP (ref 130–400)
PLATELET # BLD AUTO: 270 K/UL — SIGNIFICANT CHANGE UP (ref 130–400)
PLATELET # BLD AUTO: 274 K/UL — SIGNIFICANT CHANGE UP (ref 130–400)
PLATELET # BLD AUTO: 274 K/UL — SIGNIFICANT CHANGE UP (ref 130–400)
PLATELET # BLD AUTO: 277 K/UL — SIGNIFICANT CHANGE UP (ref 130–400)
PLATELET # BLD AUTO: 277 K/UL — SIGNIFICANT CHANGE UP (ref 130–400)
PLATELET # BLD AUTO: 284 K/UL — SIGNIFICANT CHANGE UP (ref 130–400)
PLATELET # BLD AUTO: 284 K/UL — SIGNIFICANT CHANGE UP (ref 130–400)
PLATELET # BLD AUTO: 294 K/UL — SIGNIFICANT CHANGE UP (ref 130–400)
PLATELET # BLD AUTO: 294 K/UL — SIGNIFICANT CHANGE UP (ref 130–400)
PMV BLD: 10.1 FL — SIGNIFICANT CHANGE UP (ref 7.4–10.4)
PMV BLD: 10.2 FL — SIGNIFICANT CHANGE UP (ref 7.4–10.4)
PMV BLD: 10.2 FL — SIGNIFICANT CHANGE UP (ref 7.4–10.4)
PMV BLD: 10.3 FL — SIGNIFICANT CHANGE UP (ref 7.4–10.4)
PMV BLD: 10.4 FL — SIGNIFICANT CHANGE UP (ref 7.4–10.4)
PMV BLD: 11.2 FL — HIGH (ref 7.4–10.4)
PMV BLD: 11.2 FL — HIGH (ref 7.4–10.4)
PMV BLD: 11.6 FL — HIGH (ref 7.4–10.4)
PMV BLD: 11.6 FL — HIGH (ref 7.4–10.4)
PMV BLD: 9.6 FL — SIGNIFICANT CHANGE UP (ref 7.4–10.4)
PMV BLD: 9.6 FL — SIGNIFICANT CHANGE UP (ref 7.4–10.4)
PMV BLD: 9.7 FL — SIGNIFICANT CHANGE UP (ref 7.4–10.4)
PMV BLD: 9.7 FL — SIGNIFICANT CHANGE UP (ref 7.4–10.4)
PMV BLD: 9.8 FL — SIGNIFICANT CHANGE UP (ref 7.4–10.4)
PMV BLD: 9.8 FL — SIGNIFICANT CHANGE UP (ref 7.4–10.4)
PMV BLD: 9.9 FL — SIGNIFICANT CHANGE UP (ref 7.4–10.4)
PMV BLD: 9.9 FL — SIGNIFICANT CHANGE UP (ref 7.4–10.4)
PO2 BLDV: 24 MMHG — SIGNIFICANT CHANGE UP
PO2 BLDV: 36 MMHG — SIGNIFICANT CHANGE UP
PO2 BLDV: 36 MMHG — SIGNIFICANT CHANGE UP
POTASSIUM BLDV-SCNC: 4.2 MMOL/L — SIGNIFICANT CHANGE UP (ref 3.5–5.1)
POTASSIUM BLDV-SCNC: 4.2 MMOL/L — SIGNIFICANT CHANGE UP (ref 3.5–5.1)
POTASSIUM BLDV-SCNC: 5.9 MMOL/L — HIGH (ref 3.5–5.1)
POTASSIUM SERPL-MCNC: 4.2 MMOL/L — SIGNIFICANT CHANGE UP (ref 3.5–5)
POTASSIUM SERPL-MCNC: 4.2 MMOL/L — SIGNIFICANT CHANGE UP (ref 3.5–5)
POTASSIUM SERPL-MCNC: 4.4 MMOL/L — SIGNIFICANT CHANGE UP (ref 3.5–5)
POTASSIUM SERPL-MCNC: 4.5 MMOL/L — SIGNIFICANT CHANGE UP (ref 3.5–5)
POTASSIUM SERPL-MCNC: 4.5 MMOL/L — SIGNIFICANT CHANGE UP (ref 3.5–5)
POTASSIUM SERPL-MCNC: 4.6 MMOL/L — SIGNIFICANT CHANGE UP (ref 3.5–5)
POTASSIUM SERPL-MCNC: 4.8 MMOL/L — SIGNIFICANT CHANGE UP (ref 3.5–5)
POTASSIUM SERPL-MCNC: 4.9 MMOL/L — SIGNIFICANT CHANGE UP (ref 3.5–5)
POTASSIUM SERPL-MCNC: 4.9 MMOL/L — SIGNIFICANT CHANGE UP (ref 3.5–5)
POTASSIUM SERPL-MCNC: 5.1 MMOL/L — HIGH (ref 3.5–5)
POTASSIUM SERPL-MCNC: 5.3 MMOL/L — HIGH (ref 3.5–5)
POTASSIUM SERPL-MCNC: 5.3 MMOL/L — HIGH (ref 3.5–5)
POTASSIUM SERPL-MCNC: 5.5 MMOL/L — HIGH (ref 3.5–5)
POTASSIUM SERPL-MCNC: 5.5 MMOL/L — HIGH (ref 3.5–5)
POTASSIUM SERPL-SCNC: 4.2 MMOL/L — SIGNIFICANT CHANGE UP (ref 3.5–5)
POTASSIUM SERPL-SCNC: 4.2 MMOL/L — SIGNIFICANT CHANGE UP (ref 3.5–5)
POTASSIUM SERPL-SCNC: 4.4 MMOL/L — SIGNIFICANT CHANGE UP (ref 3.5–5)
POTASSIUM SERPL-SCNC: 4.5 MMOL/L — SIGNIFICANT CHANGE UP (ref 3.5–5)
POTASSIUM SERPL-SCNC: 4.5 MMOL/L — SIGNIFICANT CHANGE UP (ref 3.5–5)
POTASSIUM SERPL-SCNC: 4.6 MMOL/L — SIGNIFICANT CHANGE UP (ref 3.5–5)
POTASSIUM SERPL-SCNC: 4.8 MMOL/L — SIGNIFICANT CHANGE UP (ref 3.5–5)
POTASSIUM SERPL-SCNC: 4.9 MMOL/L — SIGNIFICANT CHANGE UP (ref 3.5–5)
POTASSIUM SERPL-SCNC: 4.9 MMOL/L — SIGNIFICANT CHANGE UP (ref 3.5–5)
POTASSIUM SERPL-SCNC: 5.1 MMOL/L — HIGH (ref 3.5–5)
POTASSIUM SERPL-SCNC: 5.3 MMOL/L — HIGH (ref 3.5–5)
POTASSIUM SERPL-SCNC: 5.3 MMOL/L — HIGH (ref 3.5–5)
POTASSIUM SERPL-SCNC: 5.5 MMOL/L — HIGH (ref 3.5–5)
POTASSIUM SERPL-SCNC: 5.5 MMOL/L — HIGH (ref 3.5–5)
PROT ?TM UR-MCNC: 219 MG/DLG/24H — SIGNIFICANT CHANGE UP
PROT ?TM UR-MCNC: 219 MG/DLG/24H — SIGNIFICANT CHANGE UP
PROT ?TM UR-MCNC: 243 MG/DL — HIGH (ref 0–12)
PROT PATTERN 24H UR ELPH-IMP: SIGNIFICANT CHANGE UP
PROT PATTERN 24H UR ELPH-IMP: SIGNIFICANT CHANGE UP
PROT PATTERN SERPL ELPH-IMP: SIGNIFICANT CHANGE UP
PROT SERPL-MCNC: 5.9 G/DL — LOW (ref 6–8)
PROT SERPL-MCNC: 6 G/DL — SIGNIFICANT CHANGE UP (ref 6–8)
PROT SERPL-MCNC: 6 G/DL — SIGNIFICANT CHANGE UP (ref 6–8)
PROT SERPL-MCNC: 6 G/DL — SIGNIFICANT CHANGE UP (ref 6–8.3)
PROT SERPL-MCNC: 6.1 G/DL — SIGNIFICANT CHANGE UP (ref 6–8.3)
PROT SERPL-MCNC: 6.2 G/DL — SIGNIFICANT CHANGE UP (ref 6–8)
PROT SERPL-MCNC: 6.2 G/DL — SIGNIFICANT CHANGE UP (ref 6–8)
PROT SERPL-MCNC: 6.4 G/DL — SIGNIFICANT CHANGE UP (ref 6–8)
PROT SERPL-MCNC: 6.4 G/DL — SIGNIFICANT CHANGE UP (ref 6–8)
PROT SERPL-MCNC: 6.5 G/DL — SIGNIFICANT CHANGE UP (ref 6–8)
PROT SERPL-MCNC: 6.5 G/DL — SIGNIFICANT CHANGE UP (ref 6–8)
PROT SERPL-MCNC: 6.8 G/DL — SIGNIFICANT CHANGE UP (ref 6–8)
PROT SERPL-MCNC: 6.9 G/DL — SIGNIFICANT CHANGE UP (ref 6–8)
PROT SERPL-MCNC: 6.9 G/DL — SIGNIFICANT CHANGE UP (ref 6–8)
PROT SERPL-MCNC: 7.1 G/DL — SIGNIFICANT CHANGE UP (ref 6–8)
PROT SERPL-MCNC: 7.1 G/DL — SIGNIFICANT CHANGE UP (ref 6–8)
PROT SERPL-MCNC: 7.3 G/DL — SIGNIFICANT CHANGE UP (ref 6–8)
PROT SERPL-MCNC: 7.3 G/DL — SIGNIFICANT CHANGE UP (ref 6–8)
PROT UR-MCNC: 100 MG/DL
PROT UR-MCNC: 100 MG/DL
PROT/CREAT UR-RTO: 2.6 RATIO — HIGH (ref 0–0.2)
PROT/CREAT UR-RTO: 2.6 RATIO — HIGH (ref 0–0.2)
PROTHROM AB SERPL-ACNC: 13.4 SEC — HIGH (ref 9.95–12.87)
PROTHROM AB SERPL-ACNC: 13.4 SEC — HIGH (ref 9.95–12.87)
PTH-INTACT FLD-MCNC: 5 PG/ML — LOW (ref 15–65)
PTH-INTACT FLD-MCNC: 5 PG/ML — LOW (ref 15–65)
RBC # BLD: 2.83 M/UL — LOW (ref 4.7–6.1)
RBC # BLD: 2.83 M/UL — LOW (ref 4.7–6.1)
RBC # BLD: 2.84 M/UL — LOW (ref 4.7–6.1)
RBC # BLD: 2.84 M/UL — LOW (ref 4.7–6.1)
RBC # BLD: 2.87 M/UL — LOW (ref 4.7–6.1)
RBC # BLD: 2.87 M/UL — LOW (ref 4.7–6.1)
RBC # BLD: 2.99 M/UL — LOW (ref 4.7–6.1)
RBC # BLD: 2.99 M/UL — LOW (ref 4.7–6.1)
RBC # BLD: 3.01 M/UL — LOW (ref 4.7–6.1)
RBC # BLD: 3.01 M/UL — LOW (ref 4.7–6.1)
RBC # BLD: 3.06 M/UL — LOW (ref 4.7–6.1)
RBC # BLD: 3.06 M/UL — LOW (ref 4.7–6.1)
RBC # BLD: 3.18 M/UL — LOW (ref 4.7–6.1)
RBC # BLD: 3.18 M/UL — LOW (ref 4.7–6.1)
RBC # BLD: 3.2 M/UL — LOW (ref 4.7–6.1)
RBC # BLD: 3.2 M/UL — LOW (ref 4.7–6.1)
RBC # BLD: 3.21 M/UL — LOW (ref 4.7–6.1)
RBC # BLD: 3.26 M/UL — LOW (ref 4.7–6.1)
RBC # BLD: 3.26 M/UL — LOW (ref 4.7–6.1)
RBC # BLD: 3.29 M/UL — LOW (ref 4.7–6.1)
RBC # BLD: 3.29 M/UL — LOW (ref 4.7–6.1)
RBC # BLD: 3.3 M/UL — LOW (ref 4.7–6.1)
RBC # BLD: 3.3 M/UL — LOW (ref 4.7–6.1)
RBC # BLD: 3.33 M/UL — LOW (ref 4.7–6.1)
RBC # BLD: 3.36 M/UL — LOW (ref 4.7–6.1)
RBC # BLD: 3.36 M/UL — LOW (ref 4.7–6.1)
RBC # FLD: 12.4 % — SIGNIFICANT CHANGE UP (ref 11.5–14.5)
RBC # FLD: 12.5 % — SIGNIFICANT CHANGE UP (ref 11.5–14.5)
RBC # FLD: 12.6 % — SIGNIFICANT CHANGE UP (ref 11.5–14.5)
RBC # FLD: 12.7 % — SIGNIFICANT CHANGE UP (ref 11.5–14.5)
RBC # FLD: 12.8 % — SIGNIFICANT CHANGE UP (ref 11.5–14.5)
RBC # FLD: 12.8 % — SIGNIFICANT CHANGE UP (ref 11.5–14.5)
RBC # FLD: 12.9 % — SIGNIFICANT CHANGE UP (ref 11.5–14.5)
RBC # FLD: 12.9 % — SIGNIFICANT CHANGE UP (ref 11.5–14.5)
RBC # FLD: 13.1 % — SIGNIFICANT CHANGE UP (ref 11.5–14.5)
RBC # FLD: 13.1 % — SIGNIFICANT CHANGE UP (ref 11.5–14.5)
RBC # FLD: 13.2 % — SIGNIFICANT CHANGE UP (ref 11.5–14.5)
RBC # FLD: 13.2 % — SIGNIFICANT CHANGE UP (ref 11.5–14.5)
RBC CASTS # UR COMP ASSIST: 2 /HPF — SIGNIFICANT CHANGE UP (ref 0–4)
RBC CASTS # UR COMP ASSIST: 2 /HPF — SIGNIFICANT CHANGE UP (ref 0–4)
RETICS #: 39.1 K/UL — SIGNIFICANT CHANGE UP (ref 25–125)
RETICS #: 39.1 K/UL — SIGNIFICANT CHANGE UP (ref 25–125)
RETICS/RBC NFR: 1.2 % — SIGNIFICANT CHANGE UP (ref 0.5–1.5)
RETICS/RBC NFR: 1.2 % — SIGNIFICANT CHANGE UP (ref 0.5–1.5)
SAO2 % BLDV: 34.1 % — SIGNIFICANT CHANGE UP
SAO2 % BLDV: 60 % — SIGNIFICANT CHANGE UP
SAO2 % BLDV: 60 % — SIGNIFICANT CHANGE UP
SODIUM SERPL-SCNC: 127 MMOL/L — LOW (ref 135–146)
SODIUM SERPL-SCNC: 127 MMOL/L — LOW (ref 135–146)
SODIUM SERPL-SCNC: 129 MMOL/L — LOW (ref 135–146)
SODIUM SERPL-SCNC: 131 MMOL/L — LOW (ref 135–146)
SODIUM SERPL-SCNC: 131 MMOL/L — LOW (ref 135–146)
SODIUM SERPL-SCNC: 134 MMOL/L — LOW (ref 135–146)
SODIUM SERPL-SCNC: 134 MMOL/L — LOW (ref 135–146)
SODIUM SERPL-SCNC: 135 MMOL/L — SIGNIFICANT CHANGE UP (ref 135–146)
SODIUM SERPL-SCNC: 136 MMOL/L — SIGNIFICANT CHANGE UP (ref 135–146)
SODIUM SERPL-SCNC: 137 MMOL/L — SIGNIFICANT CHANGE UP (ref 135–146)
SODIUM SERPL-SCNC: 138 MMOL/L — SIGNIFICANT CHANGE UP (ref 135–146)
SODIUM SERPL-SCNC: 139 MMOL/L — SIGNIFICANT CHANGE UP (ref 135–146)
SODIUM SERPL-SCNC: 139 MMOL/L — SIGNIFICANT CHANGE UP (ref 135–146)
SP GR SPEC: 1.02 — SIGNIFICANT CHANGE UP (ref 1–1.03)
SP GR SPEC: 1.02 — SIGNIFICANT CHANGE UP (ref 1–1.03)
SQUAMOUS # UR AUTO: 0 /HPF — SIGNIFICANT CHANGE UP (ref 0–5)
SQUAMOUS # UR AUTO: 0 /HPF — SIGNIFICANT CHANGE UP (ref 0–5)
TIBC SERPL-MCNC: 143 UG/DL — LOW (ref 220–430)
TIBC SERPL-MCNC: 143 UG/DL — LOW (ref 220–430)
TRIGL SERPL-MCNC: 62 MG/DL — SIGNIFICANT CHANGE UP
TRIGL SERPL-MCNC: 62 MG/DL — SIGNIFICANT CHANGE UP
TROPONIN T SERPL-MCNC: 0.06 NG/ML — CRITICAL HIGH
TROPONIN T SERPL-MCNC: 0.07 NG/ML — CRITICAL HIGH
TROPONIN T SERPL-MCNC: 0.98 NG/ML — CRITICAL HIGH
TROPONIN T SERPL-MCNC: 0.98 NG/ML — CRITICAL HIGH
TROPONIN T SERPL-MCNC: 1.02 NG/ML — CRITICAL HIGH
TROPONIN T SERPL-MCNC: 1.02 NG/ML — CRITICAL HIGH
TROPONIN T SERPL-MCNC: 1.06 NG/ML — CRITICAL HIGH
TROPONIN T SERPL-MCNC: 1.06 NG/ML — CRITICAL HIGH
UIBC SERPL-MCNC: 86 UG/DL — LOW (ref 110–370)
UIBC SERPL-MCNC: 86 UG/DL — LOW (ref 110–370)
URATE SERPL-MCNC: 9.9 MG/DL — HIGH (ref 3.4–8.8)
URATE SERPL-MCNC: 9.9 MG/DL — HIGH (ref 3.4–8.8)
UROBILINOGEN FLD QL: 0.2 MG/DL — SIGNIFICANT CHANGE UP (ref 0.2–1)
UROBILINOGEN FLD QL: 0.2 MG/DL — SIGNIFICANT CHANGE UP (ref 0.2–1)
VIT B12 SERPL-MCNC: 1058 PG/ML — SIGNIFICANT CHANGE UP (ref 232–1245)
VIT B12 SERPL-MCNC: 1058 PG/ML — SIGNIFICANT CHANGE UP (ref 232–1245)
WBC # BLD: 10.21 K/UL — SIGNIFICANT CHANGE UP (ref 4.8–10.8)
WBC # BLD: 10.21 K/UL — SIGNIFICANT CHANGE UP (ref 4.8–10.8)
WBC # BLD: 10.27 K/UL — SIGNIFICANT CHANGE UP (ref 4.8–10.8)
WBC # BLD: 10.27 K/UL — SIGNIFICANT CHANGE UP (ref 4.8–10.8)
WBC # BLD: 10.31 K/UL — SIGNIFICANT CHANGE UP (ref 4.8–10.8)
WBC # BLD: 10.31 K/UL — SIGNIFICANT CHANGE UP (ref 4.8–10.8)
WBC # BLD: 10.78 K/UL — SIGNIFICANT CHANGE UP (ref 4.8–10.8)
WBC # BLD: 10.78 K/UL — SIGNIFICANT CHANGE UP (ref 4.8–10.8)
WBC # BLD: 10.86 K/UL — HIGH (ref 4.8–10.8)
WBC # BLD: 10.86 K/UL — HIGH (ref 4.8–10.8)
WBC # BLD: 11.97 K/UL — HIGH (ref 4.8–10.8)
WBC # BLD: 12.43 K/UL — HIGH (ref 4.8–10.8)
WBC # BLD: 12.43 K/UL — HIGH (ref 4.8–10.8)
WBC # BLD: 18.48 K/UL — HIGH (ref 4.8–10.8)
WBC # BLD: 18.48 K/UL — HIGH (ref 4.8–10.8)
WBC # BLD: 7.09 K/UL — SIGNIFICANT CHANGE UP (ref 4.8–10.8)
WBC # BLD: 7.09 K/UL — SIGNIFICANT CHANGE UP (ref 4.8–10.8)
WBC # BLD: 8.01 K/UL — SIGNIFICANT CHANGE UP (ref 4.8–10.8)
WBC # BLD: 8.01 K/UL — SIGNIFICANT CHANGE UP (ref 4.8–10.8)
WBC # BLD: 8.06 K/UL — SIGNIFICANT CHANGE UP (ref 4.8–10.8)
WBC # BLD: 8.06 K/UL — SIGNIFICANT CHANGE UP (ref 4.8–10.8)
WBC # BLD: 8.43 K/UL — SIGNIFICANT CHANGE UP (ref 4.8–10.8)
WBC # BLD: 8.43 K/UL — SIGNIFICANT CHANGE UP (ref 4.8–10.8)
WBC # BLD: 9.34 K/UL — SIGNIFICANT CHANGE UP (ref 4.8–10.8)
WBC # BLD: 9.34 K/UL — SIGNIFICANT CHANGE UP (ref 4.8–10.8)
WBC # BLD: 9.41 K/UL — SIGNIFICANT CHANGE UP (ref 4.8–10.8)
WBC # BLD: 9.41 K/UL — SIGNIFICANT CHANGE UP (ref 4.8–10.8)
WBC # FLD AUTO: 10.21 K/UL — SIGNIFICANT CHANGE UP (ref 4.8–10.8)
WBC # FLD AUTO: 10.21 K/UL — SIGNIFICANT CHANGE UP (ref 4.8–10.8)
WBC # FLD AUTO: 10.27 K/UL — SIGNIFICANT CHANGE UP (ref 4.8–10.8)
WBC # FLD AUTO: 10.27 K/UL — SIGNIFICANT CHANGE UP (ref 4.8–10.8)
WBC # FLD AUTO: 10.31 K/UL — SIGNIFICANT CHANGE UP (ref 4.8–10.8)
WBC # FLD AUTO: 10.31 K/UL — SIGNIFICANT CHANGE UP (ref 4.8–10.8)
WBC # FLD AUTO: 10.78 K/UL — SIGNIFICANT CHANGE UP (ref 4.8–10.8)
WBC # FLD AUTO: 10.78 K/UL — SIGNIFICANT CHANGE UP (ref 4.8–10.8)
WBC # FLD AUTO: 10.86 K/UL — HIGH (ref 4.8–10.8)
WBC # FLD AUTO: 10.86 K/UL — HIGH (ref 4.8–10.8)
WBC # FLD AUTO: 11.97 K/UL — HIGH (ref 4.8–10.8)
WBC # FLD AUTO: 12.43 K/UL — HIGH (ref 4.8–10.8)
WBC # FLD AUTO: 12.43 K/UL — HIGH (ref 4.8–10.8)
WBC # FLD AUTO: 18.48 K/UL — HIGH (ref 4.8–10.8)
WBC # FLD AUTO: 18.48 K/UL — HIGH (ref 4.8–10.8)
WBC # FLD AUTO: 7.09 K/UL — SIGNIFICANT CHANGE UP (ref 4.8–10.8)
WBC # FLD AUTO: 7.09 K/UL — SIGNIFICANT CHANGE UP (ref 4.8–10.8)
WBC # FLD AUTO: 8.01 K/UL — SIGNIFICANT CHANGE UP (ref 4.8–10.8)
WBC # FLD AUTO: 8.01 K/UL — SIGNIFICANT CHANGE UP (ref 4.8–10.8)
WBC # FLD AUTO: 8.06 K/UL — SIGNIFICANT CHANGE UP (ref 4.8–10.8)
WBC # FLD AUTO: 8.06 K/UL — SIGNIFICANT CHANGE UP (ref 4.8–10.8)
WBC # FLD AUTO: 8.43 K/UL — SIGNIFICANT CHANGE UP (ref 4.8–10.8)
WBC # FLD AUTO: 8.43 K/UL — SIGNIFICANT CHANGE UP (ref 4.8–10.8)
WBC # FLD AUTO: 9.34 K/UL — SIGNIFICANT CHANGE UP (ref 4.8–10.8)
WBC # FLD AUTO: 9.34 K/UL — SIGNIFICANT CHANGE UP (ref 4.8–10.8)
WBC # FLD AUTO: 9.41 K/UL — SIGNIFICANT CHANGE UP (ref 4.8–10.8)
WBC # FLD AUTO: 9.41 K/UL — SIGNIFICANT CHANGE UP (ref 4.8–10.8)
WBC UR QL: 0 /HPF — SIGNIFICANT CHANGE UP (ref 0–5)
WBC UR QL: 0 /HPF — SIGNIFICANT CHANGE UP (ref 0–5)

## 2023-01-01 PROCEDURE — 84165 PROTEIN E-PHORESIS SERUM: CPT

## 2023-01-01 PROCEDURE — 80053 COMPREHEN METABOLIC PANEL: CPT

## 2023-01-01 PROCEDURE — 85025 COMPLETE CBC W/AUTO DIFF WBC: CPT

## 2023-01-01 PROCEDURE — 99232 SBSQ HOSP IP/OBS MODERATE 35: CPT

## 2023-01-01 PROCEDURE — 97110 THERAPEUTIC EXERCISES: CPT | Mod: GP

## 2023-01-01 PROCEDURE — 85027 COMPLETE CBC AUTOMATED: CPT

## 2023-01-01 PROCEDURE — 99231 SBSQ HOSP IP/OBS SF/LOW 25: CPT

## 2023-01-01 PROCEDURE — 83735 ASSAY OF MAGNESIUM: CPT

## 2023-01-01 PROCEDURE — 93010 ELECTROCARDIOGRAM REPORT: CPT

## 2023-01-01 PROCEDURE — 93010 ELECTROCARDIOGRAM REPORT: CPT | Mod: 77

## 2023-01-01 PROCEDURE — 86901 BLOOD TYPING SEROLOGIC RH(D): CPT

## 2023-01-01 PROCEDURE — 85379 FIBRIN DEGRADATION QUANT: CPT

## 2023-01-01 PROCEDURE — 97116 GAIT TRAINING THERAPY: CPT | Mod: GP

## 2023-01-01 PROCEDURE — 99291 CRITICAL CARE FIRST HOUR: CPT

## 2023-01-01 PROCEDURE — 84155 ASSAY OF PROTEIN SERUM: CPT

## 2023-01-01 PROCEDURE — 82310 ASSAY OF CALCIUM: CPT

## 2023-01-01 PROCEDURE — 99233 SBSQ HOSP IP/OBS HIGH 50: CPT

## 2023-01-01 PROCEDURE — 36415 COLL VENOUS BLD VENIPUNCTURE: CPT

## 2023-01-01 PROCEDURE — 93010 ELECTROCARDIOGRAM REPORT: CPT | Mod: 76

## 2023-01-01 PROCEDURE — 85045 AUTOMATED RETICULOCYTE COUNT: CPT

## 2023-01-01 PROCEDURE — 82232 ASSAY OF BETA-2 PROTEIN: CPT

## 2023-01-01 PROCEDURE — 99232 SBSQ HOSP IP/OBS MODERATE 35: CPT | Mod: GC

## 2023-01-01 PROCEDURE — 83540 ASSAY OF IRON: CPT

## 2023-01-01 PROCEDURE — 80061 LIPID PANEL: CPT

## 2023-01-01 PROCEDURE — 83605 ASSAY OF LACTIC ACID: CPT

## 2023-01-01 PROCEDURE — 86334 IMMUNOFIX E-PHORESIS SERUM: CPT

## 2023-01-01 PROCEDURE — 71045 X-RAY EXAM CHEST 1 VIEW: CPT | Mod: 26

## 2023-01-01 PROCEDURE — 97162 PT EVAL MOD COMPLEX 30 MIN: CPT | Mod: GP

## 2023-01-01 PROCEDURE — 99239 HOSP IP/OBS DSCHRG MGMT >30: CPT

## 2023-01-01 PROCEDURE — 84132 ASSAY OF SERUM POTASSIUM: CPT

## 2023-01-01 PROCEDURE — 84295 ASSAY OF SERUM SODIUM: CPT

## 2023-01-01 PROCEDURE — 93306 TTE W/DOPPLER COMPLETE: CPT | Mod: 26

## 2023-01-01 PROCEDURE — 82607 VITAMIN B-12: CPT

## 2023-01-01 PROCEDURE — 85730 THROMBOPLASTIN TIME PARTIAL: CPT

## 2023-01-01 PROCEDURE — 82962 GLUCOSE BLOOD TEST: CPT

## 2023-01-01 PROCEDURE — 74176 CT ABD & PELVIS W/O CONTRAST: CPT | Mod: 26,MA

## 2023-01-01 PROCEDURE — 86803 HEPATITIS C AB TEST: CPT

## 2023-01-01 PROCEDURE — 99285 EMERGENCY DEPT VISIT HI MDM: CPT

## 2023-01-01 PROCEDURE — 80048 BASIC METABOLIC PNL TOTAL CA: CPT

## 2023-01-01 PROCEDURE — 83036 HEMOGLOBIN GLYCOSYLATED A1C: CPT

## 2023-01-01 PROCEDURE — 84100 ASSAY OF PHOSPHORUS: CPT

## 2023-01-01 PROCEDURE — 84166 PROTEIN E-PHORESIS/URINE/CSF: CPT

## 2023-01-01 PROCEDURE — 82787 IGG 1 2 3 OR 4 EACH: CPT

## 2023-01-01 PROCEDURE — 85014 HEMATOCRIT: CPT

## 2023-01-01 PROCEDURE — 83970 ASSAY OF PARATHORMONE: CPT

## 2023-01-01 PROCEDURE — 94640 AIRWAY INHALATION TREATMENT: CPT

## 2023-01-01 PROCEDURE — 84484 ASSAY OF TROPONIN QUANT: CPT

## 2023-01-01 PROCEDURE — 74230 X-RAY XM SWLNG FUNCJ C+: CPT

## 2023-01-01 PROCEDURE — 82728 ASSAY OF FERRITIN: CPT

## 2023-01-01 PROCEDURE — 71045 X-RAY EXAM CHEST 1 VIEW: CPT

## 2023-01-01 PROCEDURE — 99222 1ST HOSP IP/OBS MODERATE 55: CPT

## 2023-01-01 PROCEDURE — D0220: CPT

## 2023-01-01 PROCEDURE — 93005 ELECTROCARDIOGRAM TRACING: CPT

## 2023-01-01 PROCEDURE — 82784 ASSAY IGA/IGD/IGG/IGM EACH: CPT

## 2023-01-01 PROCEDURE — 86850 RBC ANTIBODY SCREEN: CPT

## 2023-01-01 PROCEDURE — 84156 ASSAY OF PROTEIN URINE: CPT

## 2023-01-01 PROCEDURE — 84145 PROCALCITONIN (PCT): CPT

## 2023-01-01 PROCEDURE — 31575 DIAGNOSTIC LARYNGOSCOPY: CPT

## 2023-01-01 PROCEDURE — 83521 IG LIGHT CHAINS FREE EACH: CPT

## 2023-01-01 PROCEDURE — 84550 ASSAY OF BLOOD/URIC ACID: CPT

## 2023-01-01 PROCEDURE — 92611 MOTION FLUOROSCOPY/SWALLOW: CPT | Mod: GN

## 2023-01-01 PROCEDURE — 82746 ASSAY OF FOLIC ACID SERUM: CPT

## 2023-01-01 PROCEDURE — 82803 BLOOD GASES ANY COMBINATION: CPT

## 2023-01-01 PROCEDURE — 82330 ASSAY OF CALCIUM: CPT

## 2023-01-01 PROCEDURE — 93306 TTE W/DOPPLER COMPLETE: CPT

## 2023-01-01 PROCEDURE — 99223 1ST HOSP IP/OBS HIGH 75: CPT

## 2023-01-01 PROCEDURE — 92610 EVALUATE SWALLOWING FUNCTION: CPT | Mod: GN

## 2023-01-01 PROCEDURE — D0140: CPT

## 2023-01-01 PROCEDURE — 85610 PROTHROMBIN TIME: CPT

## 2023-01-01 PROCEDURE — 86900 BLOOD TYPING SEROLOGIC ABO: CPT

## 2023-01-01 PROCEDURE — 74230 X-RAY XM SWLNG FUNCJ C+: CPT | Mod: 26

## 2023-01-01 PROCEDURE — 99221 1ST HOSP IP/OBS SF/LOW 40: CPT | Mod: 25

## 2023-01-01 PROCEDURE — 87040 BLOOD CULTURE FOR BACTERIA: CPT

## 2023-01-01 PROCEDURE — 85018 HEMOGLOBIN: CPT

## 2023-01-01 PROCEDURE — 81001 URINALYSIS AUTO W/SCOPE: CPT

## 2023-01-01 PROCEDURE — 83550 IRON BINDING TEST: CPT

## 2023-01-01 PROCEDURE — 82570 ASSAY OF URINE CREATININE: CPT

## 2023-01-01 PROCEDURE — 99221 1ST HOSP IP/OBS SF/LOW 40: CPT

## 2023-01-01 RX ORDER — IPRATROPIUM/ALBUTEROL SULFATE 18-103MCG
3 AEROSOL WITH ADAPTER (GRAM) INHALATION EVERY 6 HOURS
Refills: 0 | Status: DISCONTINUED | OUTPATIENT
Start: 2023-01-01 | End: 2023-01-01

## 2023-01-01 RX ORDER — MELOXICAM 15 MG/1
15 TABLET ORAL AT BEDTIME
Refills: 0 | Status: ACTIVE | COMMUNITY

## 2023-01-01 RX ORDER — POLYETHYLENE GLYCOL 3350 17 G/17G
17 POWDER, FOR SOLUTION ORAL DAILY
Refills: 0 | Status: DISCONTINUED | OUTPATIENT
Start: 2023-01-01 | End: 2023-01-01

## 2023-01-01 RX ORDER — ONDANSETRON 8 MG/1
4 TABLET, FILM COATED ORAL EVERY 8 HOURS
Refills: 0 | Status: DISCONTINUED | OUTPATIENT
Start: 2023-01-01 | End: 2023-01-01

## 2023-01-01 RX ORDER — SODIUM CHLORIDE 9 MG/ML
1000 INJECTION, SOLUTION INTRAVENOUS
Refills: 0 | Status: DISCONTINUED | OUTPATIENT
Start: 2023-01-01 | End: 2023-01-01

## 2023-01-01 RX ORDER — DENOSUMAB 60 MG/ML
120 INJECTION SUBCUTANEOUS ONCE
Refills: 0 | Status: COMPLETED | OUTPATIENT
Start: 2023-01-01 | End: 2023-01-01

## 2023-01-01 RX ORDER — AMPICILLIN SODIUM AND SULBACTAM SODIUM 250; 125 MG/ML; MG/ML
1.5 INJECTION, POWDER, FOR SUSPENSION INTRAMUSCULAR; INTRAVENOUS EVERY 12 HOURS
Refills: 0 | Status: DISCONTINUED | OUTPATIENT
Start: 2023-01-01 | End: 2023-01-01

## 2023-01-01 RX ORDER — MORPHINE SULFATE 50 MG/1
4 CAPSULE, EXTENDED RELEASE ORAL ONCE
Refills: 0 | Status: DISCONTINUED | OUTPATIENT
Start: 2023-01-01 | End: 2023-01-01

## 2023-01-01 RX ORDER — METHOCARBAMOL 500 MG/1
500 TABLET, FILM COATED ORAL THREE TIMES A DAY
Refills: 0 | Status: DISCONTINUED | OUTPATIENT
Start: 2023-01-01 | End: 2023-01-01

## 2023-01-01 RX ORDER — FOLIC ACID 0.8 MG
1 TABLET ORAL DAILY
Refills: 0 | Status: DISCONTINUED | OUTPATIENT
Start: 2023-01-01 | End: 2023-01-01

## 2023-01-01 RX ORDER — ALBUTEROL 90 UG/1
1 AEROSOL, METERED ORAL EVERY 4 HOURS
Refills: 0 | Status: DISCONTINUED | OUTPATIENT
Start: 2023-01-01 | End: 2023-01-01

## 2023-01-01 RX ORDER — SODIUM CHLORIDE 9 MG/ML
1000 INJECTION, SOLUTION INTRAVENOUS ONCE
Refills: 0 | Status: COMPLETED | OUTPATIENT
Start: 2023-01-01 | End: 2023-01-01

## 2023-01-01 RX ORDER — MORPHINE SULFATE 50 MG/1
4 CAPSULE, EXTENDED RELEASE ORAL EVERY 6 HOURS
Refills: 0 | Status: DISCONTINUED | OUTPATIENT
Start: 2023-01-01 | End: 2023-01-01

## 2023-01-01 RX ORDER — METHOCARBAMOL 500 MG/1
2 TABLET, FILM COATED ORAL
Refills: 0 | DISCHARGE

## 2023-01-01 RX ORDER — CEFEPIME 1 G/1
1000 INJECTION, POWDER, FOR SOLUTION INTRAMUSCULAR; INTRAVENOUS EVERY 12 HOURS
Refills: 0 | Status: DISCONTINUED | OUTPATIENT
Start: 2023-01-01 | End: 2023-01-01

## 2023-01-01 RX ORDER — VANCOMYCIN HCL 1 G
750 VIAL (EA) INTRAVENOUS ONCE
Refills: 0 | Status: DISCONTINUED | OUTPATIENT
Start: 2023-01-01 | End: 2023-01-01

## 2023-01-01 RX ORDER — ZOLEDRONIC ACID 5 MG/100ML
4 INJECTION, SOLUTION INTRAVENOUS ONCE
Refills: 0 | Status: DISCONTINUED | OUTPATIENT
Start: 2023-01-01 | End: 2023-01-01

## 2023-01-01 RX ORDER — METHOCARBAMOL 500 MG/1
1 TABLET, FILM COATED ORAL
Qty: 90 | Refills: 0
Start: 2023-01-01 | End: 2023-11-24

## 2023-01-01 RX ORDER — AMPICILLIN SODIUM AND SULBACTAM SODIUM 250; 125 MG/ML; MG/ML
INJECTION, POWDER, FOR SUSPENSION INTRAMUSCULAR; INTRAVENOUS
Refills: 0 | Status: DISCONTINUED | OUTPATIENT
Start: 2023-01-01 | End: 2023-01-01

## 2023-01-01 RX ORDER — PREGABALIN 50 MG/1
50 CAPSULE ORAL 3 TIMES DAILY
Refills: 0 | Status: ACTIVE | COMMUNITY

## 2023-01-01 RX ORDER — PANTOPRAZOLE SODIUM 20 MG/1
40 TABLET, DELAYED RELEASE ORAL EVERY 12 HOURS
Refills: 0 | Status: DISCONTINUED | OUTPATIENT
Start: 2023-01-01 | End: 2023-01-01

## 2023-01-01 RX ORDER — HEPARIN SODIUM 5000 [USP'U]/ML
5000 INJECTION INTRAVENOUS; SUBCUTANEOUS EVERY 12 HOURS
Refills: 0 | Status: DISCONTINUED | OUTPATIENT
Start: 2023-01-01 | End: 2023-01-01

## 2023-01-01 RX ORDER — SODIUM CHLORIDE 9 MG/ML
1000 INJECTION INTRAMUSCULAR; INTRAVENOUS; SUBCUTANEOUS
Refills: 0 | Status: DISCONTINUED | OUTPATIENT
Start: 2023-01-01 | End: 2023-01-01

## 2023-01-01 RX ORDER — PAMIDRONATE DISODIUM 9 MG/ML
60 INJECTION, SOLUTION INTRAVENOUS ONCE
Refills: 0 | Status: DISCONTINUED | OUTPATIENT
Start: 2023-01-01 | End: 2023-01-01

## 2023-01-01 RX ORDER — FOLIC ACID 0.8 MG
1 TABLET ORAL
Qty: 60 | Refills: 0
Start: 2023-01-01 | End: 2023-12-24

## 2023-01-01 RX ORDER — METHOCARBAMOL 500 MG/1
500 TABLET, FILM COATED ORAL EVERY 8 HOURS
Refills: 0 | Status: DISCONTINUED | OUTPATIENT
Start: 2023-01-01 | End: 2023-01-01

## 2023-01-01 RX ORDER — SODIUM CHLORIDE 9 MG/ML
4 INJECTION INTRAMUSCULAR; INTRAVENOUS; SUBCUTANEOUS EVERY 12 HOURS
Refills: 0 | Status: DISCONTINUED | OUTPATIENT
Start: 2023-01-01 | End: 2023-01-01

## 2023-01-01 RX ORDER — HYDROMORPHONE HYDROCHLORIDE 2 MG/ML
0.2 INJECTION INTRAMUSCULAR; INTRAVENOUS; SUBCUTANEOUS ONCE
Refills: 0 | Status: DISCONTINUED | OUTPATIENT
Start: 2023-01-01 | End: 2023-01-01

## 2023-01-01 RX ORDER — ONDANSETRON 8 MG/1
4 TABLET, FILM COATED ORAL ONCE
Refills: 0 | Status: COMPLETED | OUTPATIENT
Start: 2023-01-01 | End: 2023-01-01

## 2023-01-01 RX ORDER — SENNA PLUS 8.6 MG/1
2 TABLET ORAL AT BEDTIME
Refills: 0 | Status: DISCONTINUED | OUTPATIENT
Start: 2023-01-01 | End: 2023-01-01

## 2023-01-01 RX ORDER — FENTANYL CITRATE 50 UG/ML
1 INJECTION INTRAVENOUS
Refills: 0 | DISCHARGE

## 2023-01-01 RX ORDER — AMPICILLIN SODIUM AND SULBACTAM SODIUM 250; 125 MG/ML; MG/ML
1.5 INJECTION, POWDER, FOR SUSPENSION INTRAMUSCULAR; INTRAVENOUS ONCE
Refills: 0 | Status: COMPLETED | OUTPATIENT
Start: 2023-01-01 | End: 2023-01-01

## 2023-01-01 RX ORDER — FOLIC ACID 0.8 MG
1 TABLET ORAL
Refills: 0 | DISCHARGE

## 2023-01-01 RX ORDER — MAGNESIUM SULFATE 500 MG/ML
1 VIAL (ML) INJECTION ONCE
Refills: 0 | Status: DISCONTINUED | OUTPATIENT
Start: 2023-01-01 | End: 2023-01-01

## 2023-01-01 RX ORDER — LANOLIN ALCOHOL/MO/W.PET/CERES
3 CREAM (GRAM) TOPICAL AT BEDTIME
Refills: 0 | Status: DISCONTINUED | OUTPATIENT
Start: 2023-01-01 | End: 2023-01-01

## 2023-01-01 RX ORDER — SODIUM ZIRCONIUM CYCLOSILICATE 10 G/10G
10 POWDER, FOR SUSPENSION ORAL ONCE
Refills: 0 | Status: DISCONTINUED | OUTPATIENT
Start: 2023-01-01 | End: 2023-01-01

## 2023-01-01 RX ORDER — IPRATROPIUM/ALBUTEROL SULFATE 18-103MCG
3 AEROSOL WITH ADAPTER (GRAM) INHALATION EVERY 4 HOURS
Refills: 0 | Status: DISCONTINUED | OUTPATIENT
Start: 2023-01-01 | End: 2023-01-01

## 2023-01-01 RX ORDER — OXYCODONE HYDROCHLORIDE 5 MG/1
5 TABLET ORAL EVERY 8 HOURS
Refills: 0 | Status: DISCONTINUED | OUTPATIENT
Start: 2023-01-01 | End: 2023-01-01

## 2023-01-01 RX ORDER — ACETAMINOPHEN 500 MG
1 TABLET ORAL
Refills: 0 | DISCHARGE

## 2023-01-01 RX ORDER — LACTULOSE 10 G/15ML
20 SOLUTION ORAL
Refills: 0 | Status: COMPLETED | OUTPATIENT
Start: 2023-01-01 | End: 2023-01-01

## 2023-01-01 RX ORDER — PANTOPRAZOLE SODIUM 20 MG/1
40 TABLET, DELAYED RELEASE ORAL
Refills: 0 | Status: DISCONTINUED | OUTPATIENT
Start: 2023-01-01 | End: 2023-01-01

## 2023-01-01 RX ORDER — ACETAMINOPHEN 500 MG
675 TABLET ORAL ONCE
Refills: 0 | Status: DISCONTINUED | OUTPATIENT
Start: 2023-01-01 | End: 2023-01-01

## 2023-01-01 RX ORDER — CEFEPIME 1 G/1
2000 INJECTION, POWDER, FOR SOLUTION INTRAMUSCULAR; INTRAVENOUS EVERY 8 HOURS
Refills: 0 | Status: DISCONTINUED | OUTPATIENT
Start: 2023-01-01 | End: 2023-01-01

## 2023-01-01 RX ORDER — FIDAXOMICIN 200 MG/5ML
200 GRANULE, FOR SUSPENSION ORAL
Refills: 0 | Status: DISCONTINUED | OUTPATIENT
Start: 2023-01-01 | End: 2023-01-01

## 2023-01-01 RX ORDER — MAGNESIUM SULFATE 500 MG/ML
2 VIAL (ML) INJECTION ONCE
Refills: 0 | Status: COMPLETED | OUTPATIENT
Start: 2023-01-01 | End: 2023-01-01

## 2023-01-01 RX ORDER — DOCUSATE SODIUM 100 MG
1 CAPSULE ORAL
Refills: 0 | DISCHARGE

## 2023-01-01 RX ORDER — NOREPINEPHRINE BITARTRATE/D5W 8 MG/250ML
0.05 PLASTIC BAG, INJECTION (ML) INTRAVENOUS
Qty: 8 | Refills: 0 | Status: DISCONTINUED | OUTPATIENT
Start: 2023-01-01 | End: 2023-01-01

## 2023-01-01 RX ORDER — ACETAMINOPHEN 500 MG
650 TABLET ORAL EVERY 6 HOURS
Refills: 0 | Status: DISCONTINUED | OUTPATIENT
Start: 2023-01-01 | End: 2023-01-01

## 2023-01-01 RX ORDER — OXYCODONE HYDROCHLORIDE 5 MG/1
1 TABLET ORAL
Refills: 0 | DISCHARGE

## 2023-01-01 RX ORDER — GLYCERIN ADULT
1 SUPPOSITORY, RECTAL RECTAL ONCE
Refills: 0 | Status: COMPLETED | OUTPATIENT
Start: 2023-01-01 | End: 2023-01-01

## 2023-01-01 RX ORDER — IOHEXOL 300 MG/ML
30 INJECTION, SOLUTION INTRAVENOUS ONCE
Refills: 0 | Status: COMPLETED | OUTPATIENT
Start: 2023-01-01 | End: 2023-01-01

## 2023-01-01 RX ORDER — MORPHINE SULFATE 50 MG/1
2 CAPSULE, EXTENDED RELEASE ORAL ONCE
Refills: 0 | Status: DISCONTINUED | OUTPATIENT
Start: 2023-01-01 | End: 2023-01-01

## 2023-01-01 RX ORDER — PIPERACILLIN AND TAZOBACTAM 4; .5 G/20ML; G/20ML
2.25 INJECTION, POWDER, LYOPHILIZED, FOR SOLUTION INTRAVENOUS EVERY 6 HOURS
Refills: 0 | Status: DISCONTINUED | OUTPATIENT
Start: 2023-01-01 | End: 2023-01-01

## 2023-01-01 RX ORDER — SENNA PLUS 8.6 MG/1
2 TABLET ORAL
Qty: 60 | Refills: 0
Start: 2023-01-01 | End: 2023-11-24

## 2023-01-01 RX ORDER — ACETAMINOPHEN 500 MG
2 TABLET ORAL
Qty: 240 | Refills: 0
Start: 2023-01-01 | End: 2023-11-24

## 2023-01-01 RX ORDER — ACETYLCYSTEINE 200 MG/ML
4 VIAL (ML) MISCELLANEOUS
Refills: 0 | Status: DISCONTINUED | OUTPATIENT
Start: 2023-01-01 | End: 2023-01-01

## 2023-01-01 RX ORDER — SODIUM CHLORIDE 9 MG/ML
1000 INJECTION, SOLUTION INTRAVENOUS
Refills: 0 | Status: COMPLETED | OUTPATIENT
Start: 2023-01-01 | End: 2023-01-01

## 2023-01-01 RX ORDER — ACETAMINOPHEN 500 MG
975 TABLET ORAL EVERY 6 HOURS
Refills: 0 | Status: DISCONTINUED | OUTPATIENT
Start: 2023-01-01 | End: 2023-01-01

## 2023-01-01 RX ORDER — CHLORHEXIDINE GLUCONATE 213 G/1000ML
1 SOLUTION TOPICAL DAILY
Refills: 0 | Status: DISCONTINUED | OUTPATIENT
Start: 2023-01-01 | End: 2023-01-01

## 2023-01-01 RX ADMIN — HYDROMORPHONE HYDROCHLORIDE 0.2 MILLIGRAM(S): 2 INJECTION INTRAMUSCULAR; INTRAVENOUS; SUBCUTANEOUS at 01:44

## 2023-01-01 RX ADMIN — Medication 4 MILLILITER(S): at 05:52

## 2023-01-01 RX ADMIN — HEPARIN SODIUM 5000 UNIT(S): 5000 INJECTION INTRAVENOUS; SUBCUTANEOUS at 17:55

## 2023-01-01 RX ADMIN — MORPHINE SULFATE 4 MILLIGRAM(S): 50 CAPSULE, EXTENDED RELEASE ORAL at 15:52

## 2023-01-01 RX ADMIN — HEPARIN SODIUM 5000 UNIT(S): 5000 INJECTION INTRAVENOUS; SUBCUTANEOUS at 06:09

## 2023-01-01 RX ADMIN — Medication 975 MILLIGRAM(S): at 13:05

## 2023-01-01 RX ADMIN — SODIUM CHLORIDE 75 MILLILITER(S): 9 INJECTION, SOLUTION INTRAVENOUS at 15:40

## 2023-01-01 RX ADMIN — SODIUM CHLORIDE 70 MILLILITER(S): 9 INJECTION INTRAMUSCULAR; INTRAVENOUS; SUBCUTANEOUS at 01:50

## 2023-01-01 RX ADMIN — HEPARIN SODIUM 5000 UNIT(S): 5000 INJECTION INTRAVENOUS; SUBCUTANEOUS at 05:27

## 2023-01-01 RX ADMIN — POLYETHYLENE GLYCOL 3350 17 GRAM(S): 17 POWDER, FOR SOLUTION ORAL at 12:08

## 2023-01-01 RX ADMIN — OXYCODONE HYDROCHLORIDE 5 MILLIGRAM(S): 5 TABLET ORAL at 21:18

## 2023-01-01 RX ADMIN — METHOCARBAMOL 500 MILLIGRAM(S): 500 TABLET, FILM COATED ORAL at 22:04

## 2023-01-01 RX ADMIN — Medication 3 MILLILITER(S): at 03:09

## 2023-01-01 RX ADMIN — CHLORHEXIDINE GLUCONATE 1 APPLICATION(S): 213 SOLUTION TOPICAL at 13:05

## 2023-01-01 RX ADMIN — OXYCODONE HYDROCHLORIDE 5 MILLIGRAM(S): 5 TABLET ORAL at 06:08

## 2023-01-01 RX ADMIN — Medication 600 MILLIGRAM(S): at 18:20

## 2023-01-01 RX ADMIN — HEPARIN SODIUM 5000 UNIT(S): 5000 INJECTION INTRAVENOUS; SUBCUTANEOUS at 17:35

## 2023-01-01 RX ADMIN — Medication 975 MILLIGRAM(S): at 00:45

## 2023-01-01 RX ADMIN — Medication 975 MILLIGRAM(S): at 23:11

## 2023-01-01 RX ADMIN — Medication 1 MILLIGRAM(S): at 12:26

## 2023-01-01 RX ADMIN — Medication 1 MILLIGRAM(S): at 12:32

## 2023-01-01 RX ADMIN — Medication 3 MILLILITER(S): at 19:00

## 2023-01-01 RX ADMIN — HEPARIN SODIUM 5000 UNIT(S): 5000 INJECTION INTRAVENOUS; SUBCUTANEOUS at 17:50

## 2023-01-01 RX ADMIN — HEPARIN SODIUM 5000 UNIT(S): 5000 INJECTION INTRAVENOUS; SUBCUTANEOUS at 05:52

## 2023-01-01 RX ADMIN — HEPARIN SODIUM 5000 UNIT(S): 5000 INJECTION INTRAVENOUS; SUBCUTANEOUS at 06:30

## 2023-01-01 RX ADMIN — SODIUM CHLORIDE 70 MILLILITER(S): 9 INJECTION INTRAMUSCULAR; INTRAVENOUS; SUBCUTANEOUS at 06:29

## 2023-01-01 RX ADMIN — CHLORHEXIDINE GLUCONATE 1 APPLICATION(S): 213 SOLUTION TOPICAL at 12:28

## 2023-01-01 RX ADMIN — OXYCODONE HYDROCHLORIDE 5 MILLIGRAM(S): 5 TABLET ORAL at 22:14

## 2023-01-01 RX ADMIN — LACTULOSE 20 GRAM(S): 10 SOLUTION ORAL at 17:55

## 2023-01-01 RX ADMIN — OXYCODONE HYDROCHLORIDE 5 MILLIGRAM(S): 5 TABLET ORAL at 22:48

## 2023-01-01 RX ADMIN — MORPHINE SULFATE 4 MILLIGRAM(S): 50 CAPSULE, EXTENDED RELEASE ORAL at 20:05

## 2023-01-01 RX ADMIN — MORPHINE SULFATE 4 MILLIGRAM(S): 50 CAPSULE, EXTENDED RELEASE ORAL at 22:04

## 2023-01-01 RX ADMIN — METHOCARBAMOL 500 MILLIGRAM(S): 500 TABLET, FILM COATED ORAL at 05:48

## 2023-01-01 RX ADMIN — IOHEXOL 30 MILLILITER(S): 300 INJECTION, SOLUTION INTRAVENOUS at 15:57

## 2023-01-01 RX ADMIN — METHOCARBAMOL 500 MILLIGRAM(S): 500 TABLET, FILM COATED ORAL at 05:31

## 2023-01-01 RX ADMIN — Medication 975 MILLIGRAM(S): at 12:44

## 2023-01-01 RX ADMIN — CHLORHEXIDINE GLUCONATE 1 APPLICATION(S): 213 SOLUTION TOPICAL at 12:36

## 2023-01-01 RX ADMIN — METHOCARBAMOL 500 MILLIGRAM(S): 500 TABLET, FILM COATED ORAL at 06:29

## 2023-01-01 RX ADMIN — OXYCODONE HYDROCHLORIDE 5 MILLIGRAM(S): 5 TABLET ORAL at 23:45

## 2023-01-01 RX ADMIN — SODIUM CHLORIDE 1000 MILLILITER(S): 9 INJECTION, SOLUTION INTRAVENOUS at 15:30

## 2023-01-01 RX ADMIN — OXYCODONE HYDROCHLORIDE 5 MILLIGRAM(S): 5 TABLET ORAL at 18:22

## 2023-01-01 RX ADMIN — METHOCARBAMOL 500 MILLIGRAM(S): 500 TABLET, FILM COATED ORAL at 21:52

## 2023-01-01 RX ADMIN — Medication 975 MILLIGRAM(S): at 17:35

## 2023-01-01 RX ADMIN — Medication 975 MILLIGRAM(S): at 17:40

## 2023-01-01 RX ADMIN — AMPICILLIN SODIUM AND SULBACTAM SODIUM 100 GRAM(S): 250; 125 INJECTION, POWDER, FOR SUSPENSION INTRAMUSCULAR; INTRAVENOUS at 18:20

## 2023-01-01 RX ADMIN — HEPARIN SODIUM 5000 UNIT(S): 5000 INJECTION INTRAVENOUS; SUBCUTANEOUS at 05:33

## 2023-01-01 RX ADMIN — OXYCODONE HYDROCHLORIDE 5 MILLIGRAM(S): 5 TABLET ORAL at 22:04

## 2023-01-01 RX ADMIN — HEPARIN SODIUM 5000 UNIT(S): 5000 INJECTION INTRAVENOUS; SUBCUTANEOUS at 17:51

## 2023-01-01 RX ADMIN — METHOCARBAMOL 500 MILLIGRAM(S): 500 TABLET, FILM COATED ORAL at 21:06

## 2023-01-01 RX ADMIN — Medication 25 GRAM(S): at 10:22

## 2023-01-01 RX ADMIN — OXYCODONE HYDROCHLORIDE 5 MILLIGRAM(S): 5 TABLET ORAL at 14:04

## 2023-01-01 RX ADMIN — Medication 975 MILLIGRAM(S): at 12:05

## 2023-01-01 RX ADMIN — PANTOPRAZOLE SODIUM 40 MILLIGRAM(S): 20 TABLET, DELAYED RELEASE ORAL at 18:19

## 2023-01-01 RX ADMIN — METHOCARBAMOL 500 MILLIGRAM(S): 500 TABLET, FILM COATED ORAL at 14:03

## 2023-01-01 RX ADMIN — OXYCODONE HYDROCHLORIDE 5 MILLIGRAM(S): 5 TABLET ORAL at 05:27

## 2023-01-01 RX ADMIN — Medication 975 MILLIGRAM(S): at 01:00

## 2023-01-01 RX ADMIN — DENOSUMAB 120 MILLIGRAM(S): 60 INJECTION SUBCUTANEOUS at 15:36

## 2023-01-01 RX ADMIN — METHOCARBAMOL 500 MILLIGRAM(S): 500 TABLET, FILM COATED ORAL at 21:17

## 2023-01-01 RX ADMIN — ONDANSETRON 4 MILLIGRAM(S): 8 TABLET, FILM COATED ORAL at 16:49

## 2023-01-01 RX ADMIN — CHLORHEXIDINE GLUCONATE 1 APPLICATION(S): 213 SOLUTION TOPICAL at 12:45

## 2023-01-01 RX ADMIN — HEPARIN SODIUM 5000 UNIT(S): 5000 INJECTION INTRAVENOUS; SUBCUTANEOUS at 06:37

## 2023-01-01 RX ADMIN — POLYETHYLENE GLYCOL 3350 17 GRAM(S): 17 POWDER, FOR SOLUTION ORAL at 13:54

## 2023-01-01 RX ADMIN — HEPARIN SODIUM 5000 UNIT(S): 5000 INJECTION INTRAVENOUS; SUBCUTANEOUS at 05:55

## 2023-01-01 RX ADMIN — HEPARIN SODIUM 5000 UNIT(S): 5000 INJECTION INTRAVENOUS; SUBCUTANEOUS at 17:24

## 2023-01-01 RX ADMIN — Medication 975 MILLIGRAM(S): at 05:33

## 2023-01-01 RX ADMIN — OXYCODONE HYDROCHLORIDE 5 MILLIGRAM(S): 5 TABLET ORAL at 17:36

## 2023-01-01 RX ADMIN — OXYCODONE HYDROCHLORIDE 5 MILLIGRAM(S): 5 TABLET ORAL at 06:07

## 2023-01-01 RX ADMIN — SODIUM CHLORIDE 75 MILLILITER(S): 9 INJECTION, SOLUTION INTRAVENOUS at 07:55

## 2023-01-01 RX ADMIN — LACTULOSE 20 GRAM(S): 10 SOLUTION ORAL at 14:29

## 2023-01-01 RX ADMIN — METHOCARBAMOL 500 MILLIGRAM(S): 500 TABLET, FILM COATED ORAL at 21:38

## 2023-01-01 RX ADMIN — Medication 3 MILLILITER(S): at 07:51

## 2023-01-01 RX ADMIN — METHOCARBAMOL 500 MILLIGRAM(S): 500 TABLET, FILM COATED ORAL at 12:58

## 2023-01-01 RX ADMIN — CHLORHEXIDINE GLUCONATE 1 APPLICATION(S): 213 SOLUTION TOPICAL at 12:54

## 2023-01-01 RX ADMIN — HEPARIN SODIUM 5000 UNIT(S): 5000 INJECTION INTRAVENOUS; SUBCUTANEOUS at 17:02

## 2023-01-01 RX ADMIN — PIPERACILLIN AND TAZOBACTAM 200 GRAM(S): 4; .5 INJECTION, POWDER, LYOPHILIZED, FOR SOLUTION INTRAVENOUS at 12:35

## 2023-01-01 RX ADMIN — Medication 1 MILLIGRAM(S): at 11:08

## 2023-01-01 RX ADMIN — HEPARIN SODIUM 5000 UNIT(S): 5000 INJECTION INTRAVENOUS; SUBCUTANEOUS at 05:13

## 2023-01-01 RX ADMIN — HEPARIN SODIUM 5000 UNIT(S): 5000 INJECTION INTRAVENOUS; SUBCUTANEOUS at 06:34

## 2023-01-01 RX ADMIN — Medication 650 MILLIGRAM(S): at 12:21

## 2023-01-01 RX ADMIN — PIPERACILLIN AND TAZOBACTAM 200 GRAM(S): 4; .5 INJECTION, POWDER, LYOPHILIZED, FOR SOLUTION INTRAVENOUS at 07:03

## 2023-01-01 RX ADMIN — MORPHINE SULFATE 4 MILLIGRAM(S): 50 CAPSULE, EXTENDED RELEASE ORAL at 15:59

## 2023-01-01 RX ADMIN — Medication 3 MILLILITER(S): at 12:03

## 2023-01-01 RX ADMIN — Medication 975 MILLIGRAM(S): at 18:01

## 2023-01-01 RX ADMIN — METHOCARBAMOL 500 MILLIGRAM(S): 500 TABLET, FILM COATED ORAL at 05:33

## 2023-01-01 RX ADMIN — MORPHINE SULFATE 2 MILLIGRAM(S): 50 CAPSULE, EXTENDED RELEASE ORAL at 18:25

## 2023-01-01 RX ADMIN — Medication 975 MILLIGRAM(S): at 17:55

## 2023-01-01 RX ADMIN — AMPICILLIN SODIUM AND SULBACTAM SODIUM 100 GRAM(S): 250; 125 INJECTION, POWDER, FOR SUSPENSION INTRAMUSCULAR; INTRAVENOUS at 13:53

## 2023-01-01 RX ADMIN — Medication 975 MILLIGRAM(S): at 00:33

## 2023-01-01 RX ADMIN — LACTULOSE 20 GRAM(S): 10 SOLUTION ORAL at 12:58

## 2023-01-01 RX ADMIN — OXYCODONE HYDROCHLORIDE 5 MILLIGRAM(S): 5 TABLET ORAL at 20:42

## 2023-01-01 RX ADMIN — Medication 1 MILLIGRAM(S): at 12:40

## 2023-01-01 RX ADMIN — HEPARIN SODIUM 5000 UNIT(S): 5000 INJECTION INTRAVENOUS; SUBCUTANEOUS at 05:44

## 2023-01-01 RX ADMIN — HEPARIN SODIUM 5000 UNIT(S): 5000 INJECTION INTRAVENOUS; SUBCUTANEOUS at 05:23

## 2023-01-01 RX ADMIN — METHOCARBAMOL 500 MILLIGRAM(S): 500 TABLET, FILM COATED ORAL at 12:40

## 2023-01-01 RX ADMIN — Medication 1 MILLIGRAM(S): at 12:07

## 2023-01-01 RX ADMIN — Medication 1 MILLIGRAM(S): at 12:53

## 2023-01-01 RX ADMIN — POLYETHYLENE GLYCOL 3350 17 GRAM(S): 17 POWDER, FOR SOLUTION ORAL at 12:45

## 2023-01-01 RX ADMIN — Medication 3 MILLILITER(S): at 06:02

## 2023-01-01 RX ADMIN — SODIUM CHLORIDE 75 MILLILITER(S): 9 INJECTION, SOLUTION INTRAVENOUS at 22:12

## 2023-01-01 RX ADMIN — OXYCODONE HYDROCHLORIDE 5 MILLIGRAM(S): 5 TABLET ORAL at 21:39

## 2023-01-01 RX ADMIN — METHOCARBAMOL 500 MILLIGRAM(S): 500 TABLET, FILM COATED ORAL at 13:06

## 2023-01-01 RX ADMIN — OXYCODONE HYDROCHLORIDE 5 MILLIGRAM(S): 5 TABLET ORAL at 21:35

## 2023-01-01 RX ADMIN — OXYCODONE HYDROCHLORIDE 5 MILLIGRAM(S): 5 TABLET ORAL at 17:49

## 2023-01-01 RX ADMIN — SENNA PLUS 2 TABLET(S): 8.6 TABLET ORAL at 21:52

## 2023-01-01 RX ADMIN — Medication 975 MILLIGRAM(S): at 06:30

## 2023-01-01 RX ADMIN — HEPARIN SODIUM 5000 UNIT(S): 5000 INJECTION INTRAVENOUS; SUBCUTANEOUS at 18:14

## 2023-01-01 RX ADMIN — Medication 1 MILLIGRAM(S): at 12:45

## 2023-01-01 RX ADMIN — OXYCODONE HYDROCHLORIDE 5 MILLIGRAM(S): 5 TABLET ORAL at 21:05

## 2023-01-01 RX ADMIN — Medication 975 MILLIGRAM(S): at 17:02

## 2023-01-01 RX ADMIN — HEPARIN SODIUM 5000 UNIT(S): 5000 INJECTION INTRAVENOUS; SUBCUTANEOUS at 17:40

## 2023-01-01 RX ADMIN — Medication 3 MILLILITER(S): at 00:25

## 2023-01-01 RX ADMIN — Medication 1 MILLIGRAM(S): at 13:06

## 2023-01-01 RX ADMIN — MORPHINE SULFATE 2 MILLIGRAM(S): 50 CAPSULE, EXTENDED RELEASE ORAL at 17:50

## 2023-01-01 RX ADMIN — CHLORHEXIDINE GLUCONATE 1 APPLICATION(S): 213 SOLUTION TOPICAL at 12:05

## 2023-05-24 NOTE — ED ADULT NURSE NOTE - NSIMPLEMENTINTERV_GEN_ALL_ED
Implemented All Universal Safety Interventions:  [place] to call system. Call bell, personal items and telephone within reach. Instruct patient to call for assistance. Room bathroom lighting operational. Non-slip footwear when patient is off stretcher. Physically safe environment: no spills, clutter or unnecessary equipment. Stretcher in lowest position, wheels locked, appropriate side rails in place. What Type Of Note Output Would You Prefer (Optional)?: Standard Output Hpi Title: Evaluation of Skin Lesions How Severe Are Your Spot(S)?: mild Have Your Spot(S) Been Treated In The Past?: has not been treated Additional History: Patient would like a full skin examination today.

## 2023-09-18 NOTE — ED ADULT NURSE NOTE - NS ED NURSE LEVEL OF CONSCIOUSNESS MENTAL STATUS
Alert/Awake Tetracycline Counseling: Patient counseled regarding possible photosensitivity and increased risk for sunburn.  Patient instructed to avoid sunlight, if possible.  When exposed to sunlight, patients should wear protective clothing, sunglasses, and sunscreen.  The patient was instructed to call the office immediately if the following severe adverse effects occur:  hearing changes, easy bruising/bleeding, severe headache, or vision changes.  The patient verbalized understanding of the proper use and possible adverse effects of tetracycline.  All of the patient's questions and concerns were addressed. Patient understands to avoid pregnancy while on therapy due to potential birth defects.

## 2023-10-09 PROBLEM — G56.23 LESIONS OF BOTH ULNAR NERVES: Status: RESOLVED | Noted: 2023-01-01 | Resolved: 2023-01-01

## 2023-10-09 PROBLEM — G62.9 POLYNEUROPATHY: Status: ACTIVE | Noted: 2023-01-01

## 2023-10-09 PROBLEM — Z87.891 FORMER SMOKER: Status: ACTIVE | Noted: 2023-01-01

## 2023-10-09 PROBLEM — G56.03 BILATERAL CARPAL TUNNEL SYNDROME: Status: RESOLVED | Noted: 2023-01-01 | Resolved: 2023-01-01

## 2023-10-16 NOTE — ED ADULT TRIAGE NOTE - CHIEF COMPLAINT QUOTE
Pt w/ mult myeloma, c/o diarrhea since Friday, poor appetite, generalized weakness since Friday. Pt w/ mult myeloma, c/o diarrhea since Friday, poor appetite, generalized weakness since Friday. Triage FS=99.

## 2023-10-16 NOTE — ED ADULT NURSE REASSESSMENT NOTE - NS ED NURSE REASSESS COMMENT FT1
Assumed care from previous day shift nurse Leandra c/o generalized weakness , pt AO x 4 , denies any pain , no SOB , call light within reached , awaiting to go to CT

## 2023-10-16 NOTE — ED PROVIDER NOTE - ATTENDING CONTRIBUTION TO CARE
57-year-old male past medical history of multiple myeloma off of treatment for the last year and a half, CKD anemia presents with generalized weakness fatigue malaise symptoms since Friday.  Patient states on Friday he had 6 hours of watery diarrhea.  Since then does has decreased p.o. appetite and nausea.  Has diffuse abdominal pain associated sharp constant nonradiating.  No vomiting.  No fever.  No recent travel sick contacts or recent antibiotics.  No chest pain shortness of breath.  Reports dyspnea on exertion.  Patient concerned he might be anemic has required transfusion in the past.  Denies bright red blood per rectum or melena.  Patient also concerned about his kidney function, follows with Dr. Ministerio Garcia.  No urinary symptoms.  Patient states he feels dehydrated.  No prior abdominal surgeries.    On exam, AFVSS, cachectic frail appears older than stated age, well appearing, No acute distress, NCAT, EOMI, PERRLA, dry MM, Neck supple, LCTAB, RRR nl s1s2 No mrg, Abdomen Soft mild diffuse tenderness to palpation, no rebound or rigidity, no CVA tenderness, ND, AAOx3, No Focal Deficits, No LE edema or calf TTP,    A/P; concern for colitis MAC and anemia gastroenteritis, rule out ACS CHF will do labs EKG chest x-ray CT abdomen pelvis IV fluids Zofran pain control reeval

## 2023-10-16 NOTE — ED PROVIDER NOTE - PHYSICAL EXAMINATION
CONSTITUTIONAL: NAD, cachectic  SKIN: Warm dry  HEAD: NCAT  EYES: NL inspection  ENT: MMM  NECK: Supple; non tender.  CARD: RRR  RESP: CTAB  ABD: soft, mild tender diffusely, no R/G  EXT: no pedal edema  NEURO: Grossly unremarkable  PSYCH: Cooperative, appropriate.

## 2023-10-16 NOTE — ED PROVIDER NOTE - CARE PLAN
1 Principal Discharge DX:	Abdominal pain  Secondary Diagnosis:	MAC (acute kidney injury)  Secondary Diagnosis:	Multiple myeloma  Secondary Diagnosis:	Elevated troponin

## 2023-10-16 NOTE — ED PROVIDER NOTE - CLINICAL SUMMARY MEDICAL DECISION MAKING FREE TEXT BOX
pt found to have fabian dehydration, elevated troponin. no acute surg pathology on CT scan, admit to medicine for further treatment

## 2023-10-16 NOTE — ED PROVIDER NOTE - OBJECTIVE STATEMENT
57-year-old male past medical history of multiple myeloma diagnosed in 2017 not on any chemotherapy or radiation coming in with complaints of general malaise and weakness.  Patient reports that on Friday he had multiple episodes of diarrhea, followed by abdominal pain on Sunday.  Since then he is felt dehydrated with weakness and fatigue.  Associated with decreased p.o. appetite, mild CP and nausea. Denies any fever, chills, vomiting,  changes in urination.

## 2023-10-17 NOTE — H&P ADULT - ATTENDING COMMENTS
*In the event of discrepancy, my note supersedes the resident note.    Date seen: 10/16/23    Agree with HPI above.   Labs and radiology as above.   ROS negative except per HPI.    PHYSICAL EXAM:  GENERAL: NAD, lying in bed comfortably, cachectic   HEAD:  Atraumatic, Normocephalic  EYES: EOMI, PERRLA, conjunctiva and sclera clear  ENT: Moist mucous membranes  NECK: Supple, No JVD  CHEST/LUNG: Clear to auscultation bilaterally; No rales, rhonchi, wheezing, or rubs. Unlabored respirations  HEART: Regular rate and rhythm; No murmurs, rubs, or gallops  ABDOMEN: Bowel sounds present; Soft, Nontender, Nondistended. No hepatomegally  EXTREMITIES:  2+ Peripheral Pulses, brisk capillary refill. No clubbing, cyanosis, or edema  NERVOUS SYSTEM:  Alert & Oriented X3, speech clear. No deficits   MSK: FROM all 4 extremities, full and equal strength  SKIN: rash with hyperpigmentation noted on left sided ribs     Assessment/Plan:  57yo M w/ pmhx of multiple myeloma diagnosed in 2017 not on any chemotherapy or radiation and CKD 3B coming in with complaints of Left flank pain. Found to have MAC on CKD and elevated troponins.     #MAC on CKD 3B   - Cr 2.3 (baseline 1.9-2.1)   - recent diarrhea, patient states he ate burger sahra prior to diarrhea   - NS 70 cc/hr   -Trend Cr  - nephro consult Dr. Garcia     #elevated troponemia  #dyspnea on exertion   - reports SOB w/ exertion (no CP at rest or exertion) but this can be due to his untreated multiple myeloma  - EKG shows lead III and aVF t-wave inversions  - trend trops, serial ecgs; get TTE; get lipid profile/a1c/TSH  -consider cardio eval    #Multiple myeloma  #Anemia likely due MM  -not on treatment, lost to follow up   - oncology consult Dr. Celia Valadez     #Left flank pain likely musculoskeletal  -increases on movement, walking, changing positions  -pain control with oxycodone PRN and Tylenol; try robaxin    -PT eval    #Diarrhea - resolved  -get gi pcr if diarrhea recurs     DVT ppx: heparin subq   Dispo: from home; PT eval, nephro eval, repeat ecg/trend trop/TTE

## 2023-10-17 NOTE — H&P ADULT - ASSESSMENT
58-year-old male past medical history of multiple myeloma diagnosed in 2017 not on any chemotherapy or radiation coming in with complaints of Left flank pain.    #Left flank pain likely musculoskeletal  -increases on movement, walking, changing positions    #Multiple myeloma  #Anemia likely due MM  -not on treatment   -has seen Dr. Celia Valadez but lost to follow up.      #Mild leukocytosis   -afebrile  -monitor off abx    #Diarrhea - resolved  #Nausea and loss of apetite likely due to MM  -f/u with nutrition  -get stool pcr and gi pcr if diarrhea present     #MAC ontop of CKD vs CKD progression  -follows with Dr. Garcia  -baseline Cr around 2.0, now 2.3  -s/p fluids in the ed  -will c/w fluids  -Trend Cr   58-year-old male past medical history of multiple myeloma diagnosed in 2017 not on any chemotherapy or radiation coming in with complaints of Left flank pain.    #Left flank pain likely musculoskeletal  -increases on movement, walking, changing positions  -start robaxin  -pain control with oxycodone PRN and Tylenol   -PT follow up    #MAC ontop of CKD vs CKD progression  -follows with Dr. Garcia  -baseline Cr around 2.0, now 2.3  -s/p fluids in the ed  -will c/w fluids  -Trend Cr    #Troponin elevated  -EKG shows lead III and aVF t-wave inversions  -no chest pain  -no cardiac hx  -f/u tte  -f/u AM troponin and AM ekg  -consider cardio eval  -f/u a1c am and lipid profile    #Mild leukocytosis   -afebrile  -monitor off abx    #Multiple myeloma  #Anemia likely due MM  -not on treatment   -has seen Dr. Celia Valadez but lost to follow up. Consult is in    #Diarrhea - resolved  #Nausea and loss of apetite likely due to MM  -f/u with nutrition  -get stool pcr and gi pcr if diarrhea present

## 2023-10-17 NOTE — H&P ADULT - NSHPPHYSICALEXAM_GEN_ALL_CORE
PHYSICAL EXAM:  GENERAL: NAD, speaks in full sentences, no signs of respiratory distress  PSYCH: AAOx3  NECK: Supple, No JVD  CHEST/LUNG: Clear to auscultation bilaterally; No wheeze; No crackles; No accessory muscles used  HEART: Regular rate and rhythm; No murmurs;   ABDOMEN: Soft, Nontender, Nondistended; Bowel sounds present; No guarding  EXTREMITIES: No cyanosis or edema  NEUROLOGY: non-focal  SKIN: Rash on right chest, no tenderness

## 2023-10-17 NOTE — ED ADULT NURSE REASSESSMENT NOTE - NS ED NURSE REASSESS COMMENT FT1
transported pt to ED bed 4 , report given to new primary nurse Thania , pt AO x 4 , IVL intact , all belongings sent to unit with pt

## 2023-10-17 NOTE — H&P ADULT - NSHPREVIEWOFSYSTEMS_GEN_ALL_CORE
REVIEW OF SYSTEMS:    CONSTITUTIONAL: In moderate pain  EYES/ENT: No visual changes;  No vertigo or throat pain   NECK: No pain or stiffness  RESPIRATORY: No cough, wheezing, hemoptysis; No shortness of breath  CARDIOVASCULAR: No chest pain or palpitations  GASTROINTESTINAL: Left Flank pain. No nausea, vomiting, or hematemesis; No diarrhea or constipation. No melena or hematochezia.  GENITOURINARY: No dysuria, frequency or hematuria  NEUROLOGICAL: No numbness or weakness  SKIN: Right chest rash

## 2023-10-17 NOTE — H&P ADULT - NSHPLABSRESULTS_GEN_ALL_CORE
.  LABS:                         10.6   11.97 )-----------( 266      ( 16 Oct 2023 15:43 )             31.6     10-16    136  |  101  |  23<H>  ----------------------------<  80  5.1<H>   |  23  |  2.3<H>    Ca    10.8<H>      16 Oct 2023 15:43    TPro  6.8  /  Alb  3.9  /  TBili  0.3  /  DBili  x   /  AST  21  /  ALT  13  /  AlkPhos  68  10-16      Urinalysis Basic - ( 16 Oct 2023 15:43 )    Color: x / Appearance: x / SG: x / pH: x  Gluc: 80 mg/dL / Ketone: x  / Bili: x / Urobili: x   Blood: x / Protein: x / Nitrite: x   Leuk Esterase: x / RBC: x / WBC x   Sq Epi: x / Non Sq Epi: x / Bacteria: x            RADIOLOGY, EKG & ADDITIONAL TESTS: Reviewed.

## 2023-10-17 NOTE — H&P ADULT - HISTORY OF PRESENT ILLNESS
58-year-old male past medical history of multiple myeloma diagnosed in 2017 not on any chemotherapy or radiation coming in with complaints of Left flank pain. The pain started 2 days prior to admission, scale 10/10, on feels the pain while moving, walking, or changing positions, when he stays still there is no pain at all. Patient also has nausea and loss of apetitie for the past few days. He reports that on last week he had several episodes of diarrhea which resolved after taking imodium.    In the ED vitals were stable.  wbc showed mild leukocytosis  troponin were 0.06  EKG shows new III and aVF t wave inversions  CT scan with oral contrast shows no obstruction. It shows multiple lytic lesions of the spine and pelvis, consistent with known   history of multiple myeloma.

## 2023-10-17 NOTE — PROGRESS NOTE ADULT - ASSESSMENT
57 y/o man with PMH of multiple myeloma diagnosed in 2017 and not on any chemotherapy or radiation and CKD 3B presented with Left flank pain. He was found to have MAC on CKD and elevated troponins and proBNP.     1. MAC on CKD 3B   (baseline 1.9-2.1)  likely prerenal from diarrhea and now resolved   hypercalcemia now improved  no hydronephrosis on CT scan  avoid nephrotoxic meds (NO morphine)    2. Dyspnea on exertion - unclear etiology at this time  pulse ox 99% on RA -> check pulse ox on exertion and ddimer  elevated troponins to 0.07 -> check another one to trend  proBNP 5K  lower chest of CT scan: atelectasis, trace left pleural effusion  ECHO: EF 61%  EKG reviewed by me and appears unchanged from previous EKG (flat T wave in 2011)  telemetry - no events -> continue for now    3. Multiple myeloma  Anemia likely due MM  not on treatment but used to see Dr. Celia Valadez - consult if pt is interested in pursuing therapy    4. Lower abdominal and low back spasms - worse with movement - not at rest  possibly musculoskeletal  tylenol, robaxin prn and monitor  oxycodone prn moderate pain    5. DVT ppx: heparin sq    full code      PROGRESS NOTE HANDOFF    Pending: repeat troponin and ddimer, monitor PO intake and for diarrhea (stop IVF if PO intake is sufficient), pulse ox on exertion  pt informed of the plan of care  Disposition: from home

## 2023-10-18 NOTE — CONSULT NOTE ADULT - SUBJECTIVE AND OBJECTIVE BOX
NEPHROLOGY CONSULTATION NOTE        PAST MEDICAL & SURGICAL HISTORY:  Multiple myeloma, remission status unspecified      No significant past surgical history        Allergies:  No Known Allergies    Home Medications Reviewed    SOCIAL HISTORY:  Denies ETOH,Smoking,   FAMILY HISTORY:  No pertinent family history in first degree relatives          REVIEW OF SYSTEMS:  CONSTITUTIONAL: No weakness, fevers or chills  EYES/ENT: No visual changes;  No vertigo or throat pain   NECK: No pain or stiffness  RESPIRATORY: No cough, wheezing, hemoptysis; No shortness of breath  CARDIOVASCULAR: No chest pain or palpitations.  GASTROINTESTINAL: No abdominal or epigastric pain. No nausea, vomiting, or hematemesis; No diarrhea or constipation. No melena or hematochezia.  GENITOURINARY: No dysuria, frequency, foamy urine, urinary urgency, incontinence or hematuria  NEUROLOGICAL: No numbness or weakness  SKIN: No itching, burning, rashes, or lesions   VASCULAR: No bilateral lower extremity edema.   All other review of systems is negative unless indicated above.    PHYSICAL EXAM:  Constitutional: NAD  HEENT: anicteric sclera, oropharynx clear, MMM  Neck: No JVD  Respiratory: CTAB, no wheezes, rales or rhonchi  Cardiovascular: S1, S2, RRR  Gastrointestinal: BS+, soft, NT/ND  Extremities: No cyanosis or clubbing. No peripheral edema  Neurological: A/O x 3, no focal deficits  Psychiatric: Normal mood, normal affect  : No CVA tenderness. No mcdonnell.   Skin: No rashes  Vascular Access: NEPHROLOGY CONSULTATION NOTE    58-year-old male past medical history of multiple myeloma diagnosed in 2017 not on any chemotherapy or radiation coming in with complaints of Left flank pain. The pain started 2 days prior to admission, scale 10/10, on feels the pain while moving, walking, or changing positions, when he stays still there is no pain at all. Patient also has nausea and loss of apetitie for the past few days. He reports that on last week he had several episodes of diarrhea which resolved after taking imodium.      renal:  pt presents with diarrhea, dehydration and truncal musculoskeletal pain.  Found to have abnormal renal function, thus renal consulted.  Last Cr 1.9 (6/2021).  No voiding complaints.  Took few nsaids for pain, not on any other meds.  Denies swelling.  Poor po intake due to dental issues       PAST MEDICAL & SURGICAL HISTORY:  Multiple myeloma, remission status unspecified      No significant past surgical history        Allergies:  No Known Allergies    Home Medications Reviewed    SOCIAL HISTORY:  Denies ETOH,Smoking,   FAMILY HISTORY:  No pertinent family history in first degree relatives          REVIEW OF SYSTEMS:  as above  All other review of systems is negative unless indicated above.    PHYSICAL EXAM:  Constitutional: frail  HEENT: poor dentition  Neck: No JVD  Respiratory: CTAB, no wheezes, rales or rhonchi  Cardiovascular: S1, S2, RRR  Gastrointestinal: BS+, soft, NT/ND  Extremities: No cyanosis or clubbing. No peripheral edema  Neurological: A/O x 3, no focal deficits  Psychiatric: Normal mood, normal affect  : No CVA tenderness. No mcdonnell.   Skin: No rashes    Hospital Medications:   MEDICATIONS  (STANDING):  heparin   Injectable 5000 Unit(s) SubCutaneous every 12 hours  sodium chloride 0.9%. 1000 milliLiter(s) (70 mL/Hr) IV Continuous <Continuous>        VITALS:  T(F): 97.6 (10-18-23 @ 08:09), Max: 98.7 (10-17-23 @ 15:49)  HR: 69 (10-18-23 @ 08:09)  BP: 124/58 (10-18-23 @ 08:09)  RR: 18 (10-18-23 @ 08:09)  SpO2: 97% (10-18-23 @ 08:09)  Wt(kg): --        LABS:  10-18    137  |  102  |  31<H>  ----------------------------<  74  4.2   |  24  |  2.2<H>    Ca    9.9      18 Oct 2023 06:51  Mg     1.8     10-18    TPro  5.9<L>  /  Alb  3.3<L>  /  TBili  0.3  /  DBili      /  AST  17  /  ALT  11  /  AlkPhos  57  10-18                          9.1    9.34  )-----------( 230      ( 18 Oct 2023 06:51 )             27.8       Urine Studies:  Urinalysis Basic - ( 18 Oct 2023 06:51 )    Color:  / Appearance:  / SG:  / pH:   Gluc: 74 mg/dL / Ketone:   / Bili:  / Urobili:    Blood:  / Protein:  / Nitrite:    Leuk Esterase:  / RBC:  / WBC    Sq Epi:  / Non Sq Epi:  / Bacteria:           RADIOLOGY & ADDITIONAL STUDIES:

## 2023-10-18 NOTE — PHYSICAL THERAPY INITIAL EVALUATION ADULT - SPECIFY REASON(S)
pt declined PT at this time due to C/O "muscle spams" with any trunk movement.  PT to follow. see education note below
Pt declined due to pain/spasms in low back. see below

## 2023-10-18 NOTE — PROGRESS NOTE ADULT - ASSESSMENT
57 y/o man with PMH of multiple myeloma diagnosed in 2017 and not on any chemotherapy or radiation and CKD 3B presented with Left flank pain. He was found to have MAC on CKD and elevated troponins and proBNP.     1. MAC on CKD 3B   (baseline 1.9-2.1)  likely prerenal from diarrhea and now improved  hypercalcemia now improved  no hydronephrosis on CT scan  avoid nephrotoxic meds (NO morphine)    2. Dyspnea on exertion (when ambulating stairs) - unclear etiology at this time - pt thinks it may be due to untreated MM  pulse ox 97% on RA -> check pulse ox on exertion   ddimer<150  elevated troponins to 0.07 -> check another one to trend - pt with CKD  proBNP 5K  lower chest of CT scan: atelectasis, trace left pleural effusion  ECHO: EF 61%  EKG reviewed by me and appears unchanged from previous EKG (flat T wave in 2011)  telemetry - no events     3. Multiple myeloma - pt was on treatment but stopped it 1.5 years ago due to LE swelling  Anemia likely due MM  pt now wants to resume therapy - Oncology Dr. Celia Valadez consulted    4. Lower abdominal and low back spasms - worse with movement - not at rest  possibly musculoskeletal  tylenol, robaxin prn and monitor  oxycodone prn moderate pain    5. DVT ppx: heparin sq    full code - need GOC discussion - pt and sister were focused on Oncology consult so would attempt at another time      PROGRESS NOTE HANDOFF    Pending: repeat troponin, pulse ox on exertion, Oncology consult  pt and sister informed of the plan of care  Disposition: from home

## 2023-10-18 NOTE — CONSULT NOTE ADULT - ASSESSMENT
CKD stage 3-4 (eGFR likely overestimating renal function due to muscle atrophy)  doubt true MAC  no hydro on CT abdomen  Cr 1.9 (6/2021)  etiology likely due to MM  no outpt follow up and off any meds for > 1 year  multiple bone lytic lesions with bony / musculoskeletal pain  mild hypercalcemia, improved   s/p kyphoplasty  anasarca on CT abd likely from 3rd spacing   anemia  poor dentition / malnutrition / hypoalbuminemia  EF 61% / nl echo    plan:    cont NS @ 70 cc/hr another 24 hours  encourage po hydration  consider S&S eval / needs dental eval / dietary eval  pt counselled on avoiding further NSAIDs  check PO4, iPTH, SPEP, SFLC  check UA, Urine protein Cr, UPEP and urine immunofixation  f/u oncology  outpatient renal follow up  needs re-establishment with outpt onc, pmd and renal   d/w sister

## 2023-10-18 NOTE — PATIENT PROFILE ADULT - MST SCORE
Reason for Call:  Other prescription    Detailed comments: Wants prednisone. A nurse visited him and said he sounded very bad. Todl him to ask for prednisone. Please call back and advise. [id number] [place], [place] - [address] [address]     Phone Number Patient can be reached at: Cell number on file:    Telephone Information:   Mobile [phone]     Best Time: any    Can we leave a detailed message on this number? YES    Call taken on 6/24/2019 at 11:53 AM by Nichole Bansal     2

## 2023-10-18 NOTE — PATIENT PROFILE ADULT - FALL HARM RISK - HARM RISK INTERVENTIONS

## 2023-10-19 NOTE — DIETITIAN INITIAL EVALUATION ADULT - PERTINENT MEDS FT
MEDICATIONS  (STANDING):  heparin   Injectable 5000 Unit(s) SubCutaneous every 12 hours    MEDICATIONS  (PRN):  acetaminophen     Tablet .. 650 milliGRAM(s) Oral every 6 hours PRN Mild Pain (1 - 3)  methocarbamol 500 milliGRAM(s) Oral every 8 hours PRN Muscle Spasm  oxyCODONE    IR 5 milliGRAM(s) Oral every 8 hours PRN Moderate Pain (4 - 6)

## 2023-10-19 NOTE — PROGRESS NOTE ADULT - ASSESSMENT
CKD stage 3-4 with proteinuria   no hydro on CT abdomen  Cr 1.9 (6/2021)  etiology likely due to MM  multiple bone lytic lesions with bony / musculoskeletal pain  mild hypercalcemia, improved   s/p kyphoplasty  anasarca on CT abd likely from 3rd spacing   anemia  poor dentition / malnutrition / hypoalbuminemia  EF 61% / nl echo    plan:    can dc IVF  f/u S&S / dental eval    pt counselled on avoiding further NSAIDs  f/u PO4, iPTH, SPEP, SFLC  f/u UPEP and urine immunofixation  f/u oncology  outpatient renal follow up  full code

## 2023-10-19 NOTE — PHYSICAL THERAPY INITIAL EVALUATION ADULT - LEVEL OF INDEPENDENCE: STAIR NEGOTIATION, REHAB EVAL
NA: pt declined this activity stating "I don't want to push it". pt educated on safe stair negotiation methods.

## 2023-10-19 NOTE — DIETITIAN INITIAL EVALUATION ADULT - ADD RECOMMEND
1. ADD Ensure Plus HP 3X/DAILY to optimize kcal/pro intake -- provides 1050 kcal/day total, 60g protein/day total; ADD MAGIC CUP to lunch -- provides 290 kcal, 9g protein total  2. Continue with current diet order  3. Encourage PO intake and assist during meals prn    Pt is at high nutrition risk; will f/u in 3 days or prn.

## 2023-10-19 NOTE — DIETITIAN INITIAL EVALUATION ADULT - NAME AND PHONE
Sagrario [phone] or TEAMS    Nutrition Interventions: meals, snacks, medical nutrition supplements; Nutrition Monitoring: Diet order, PO intake, weights, labs, NFPF, body composition, BM and tolerance to medical food supplements

## 2023-10-19 NOTE — DIETITIAN INITIAL EVALUATION ADULT - OTHER CALCULATIONS
Using ABW: ENERGY: 7565-9365 g/day (30-35 kcal/kg); PROTEIN: 68-85 g/day (1.2-1.5 g/kg); FLUID: 1mL/kcal -- with consideration for age, BMI, multiple myeloma, malnutrition

## 2023-10-19 NOTE — DIETITIAN INITIAL EVALUATION ADULT - ORAL INTAKE PTA/DIET HISTORY
Pt reports good appetite prior to admit; consumed 2 meals daily + snacks. Denies taking vitamin or mineral supplements. Denies drinking protein shake supplements. NFKA; denies any restrictive cultural or [employer] food preferences. No chewing or swallowing difficulties noted with regular textured food.  Pt reports good appetite prior to admit; consumed 2 meals daily + snacks. Pt consumes softer food at home d/t only having 4 teeth. Denies taking vitamin or mineral supplements. Denies drinking protein shake supplements. NFKA; denies any restrictive cultural or Rastafari food preferences. No chewing or swallowing difficulties noted with regular textured food.

## 2023-10-19 NOTE — PHYSICAL THERAPY INITIAL EVALUATION ADULT - PERTINENT HX OF CURRENT PROBLEM, REHAB EVAL
58-year-old male past medical history of multiple myeloma diagnosed in 2017 not on any chemotherapy or radiation coming in with complaints of Left flank pain. The pain started 2 days prior to admission, scale 10/10, on feels the pain while moving, walking, or changing positions, when he stays still there is no pain at all. Patient also has nausea and loss of apetitie for the past few days

## 2023-10-19 NOTE — DIETITIAN INITIAL EVALUATION ADULT - NSICDXPASTSURGICALHX_GEN_ALL_CORE_FT
Rx for Ambien called in to [facility],left message for patient to return call.      PAST SURGICAL HISTORY:  No significant past surgical history

## 2023-10-19 NOTE — PHYSICAL THERAPY INITIAL EVALUATION ADULT - PHYSICAL ASSIST/NONPHYSICAL ASSIST: SIT/STAND, REHAB EVAL
Addended by: VEIRTO HERNANDEZ on: 1/25/2022 10:26 AM     Modules accepted: Orders     verbal cues/1 person assist

## 2023-10-19 NOTE — DIETITIAN INITIAL EVALUATION ADULT - PERTINENT LABORATORY DATA
10-19    138  |  103  |  26<H>  ----------------------------<  70  4.4   |  24  |  2.2<H>    Ca    9.8      19 Oct 2023 07:10  Phos  3.7     10-19  Mg     1.8     10-19    TPro  5.9<L>  /  Alb  3.2<L>  /  TBili  0.2  /  DBili  x   /  AST  16  /  ALT  11  /  AlkPhos  57  10-19  A1C with Estimated Average Glucose Result: 5.2 % (10-17-23 @ 07:20)

## 2023-10-19 NOTE — DIETITIAN NUTRITION RISK NOTIFICATION - TREATMENT: THE FOLLOWING DIET HAS BEEN RECOMMENDED
Diet, Soft and Bite Sized:   Free Water Flush Instructions:  chocolate ensure plus high protein; magic cup for dinner  Supplement Feeding Modality:  Oral  Ensure Plus High Protein Cans or Servings Per Day:  3       Frequency:  Daily (10-19-23 @ 13:28) [Pending Verification By Attending]  Diet, Soft and Bite Sized (10-19-23 @ 12:24) [Active]

## 2023-10-19 NOTE — SWALLOW BEDSIDE ASSESSMENT ADULT - COMMENTS
Mild/mod oral dysphagia with easy to chew solids, + tolerance for soft & bite sized with thin liquids without overt s/s of penetration/ aspiration.

## 2023-10-19 NOTE — PROGRESS NOTE ADULT - ASSESSMENT
59 y/o man with PMH of multiple myeloma diagnosed in 2017 and not on any chemotherapy or radiation and CKD 3B presented with Left flank pain. He was found to have MAC on CKD and elevated troponins and proBNP.     #MAC on CKD 3B   *(baseline 1.9-2.1)  - likely prerenal from diarrhea and now improved  - hypercalcemia now improved  - no hydronephrosis on CT scan  - avoid nephrotoxic meds (NO morphine)    #Dyspnea on exertion  *unclear etiology at this time - pt thinks it may be due to untreated MM  - ddimer<150  - elevated troponins to 0.07 - pt with CKD  - proBNP 5K  - lower chest of CT scan: atelectasis, trace left pleural effusion  - ECHO: EF 61%  - telemetry - no events     #Multiple myeloma   - pt was on treatment but stopped it 1.5 years ago due to LE swelling  - Anemia likely due MM  - pt now wants to resume therapy - Oncology Dr. Celia Valadez consulted ([phone]) - called 2x 10/19, no answer    #Lower abdominal and low back spasms   - worse with movement - not at rest  - possibly musculoskeletal  - tylenol 650mg q6h PRN  - robaxin 500mg q8h PRN  - oxycodone IR 5mg q8h PRN for moderate pain    #Malnourished  - patient states difficult to eat without dentures, cheeks are swollen from teeth rubbing into side of cheeks  - patient requesting dentures  - f/u dental consult                                                                              ----------------------------------------------------  # DVT prophylaxis: Heparin  # GI prophylaxis: NI  # Diet: Soft/Bite Sized  # Activity: AAT  # Disposition: from home                                                                           --------------------------------------------------------    # Handoff:   - f/u Heme onc (Dr. Celia Valadez)  - f/u dental consult

## 2023-10-19 NOTE — CONSULT NOTE ADULT - ASSESSMENT
IMPRESSION: Rehab of gait dysfunction / MAC on CKD / multiple myeloma,  CKD    PRECAUTIONS: [   ] Cardiac  [   ] Respiratory  [   ] Seizures [   ] Contact Isolation  [   ] Droplet Isolation  [   ] Other    Weight Bearing Status:     RECOMMENDATION:    Out of Bed to Chair     DVT/Decubiti Prophylaxis    REHAB PLAN:     [   x ] Bedside P/T 3-5 times a week   [    ]   Bedside O/T  2-3 times a week             [    ] Speech Therapy               [    ]  No Rehab Therapy Indicated   Conditioning/ROM                                    ADL  Bed Mobility                                               Conditioning/ROM  Transfers                                                     Bed Mobility  Sitting /Standing Balance                         Transfers                                        Gait Training                                               Sitting/Standing Balance  Stair Training [   ]Applicable                    Home equipment Eval                                                                        Splinting  [   ] Only      GOALS:   ADL   [    ]   Independent                    Transfers  [   x ] Independent                          Ambulation  [ x   ] Independent     [  x   ] With device                            [    ]  CG                                                         [    ]  CG                                                                  [    ] CG                            [    ] Min A                                                   [    ] Min A                                                              [    ] Min  A          DISCHARGE PLAN:   [    ]  Good candidate for Intensive Rehabilitation/Hospital based                                             Will tolerate 3hrs Intensive Rehab Daily                                       [     ]  Short Term Rehab in Skilled Nursing Facility                                       [ x    ]  Home with Outpatient or  services                                         [     ]  Possible Candidate for Intensive Hospital based Rehab

## 2023-10-19 NOTE — DIETITIAN INITIAL EVALUATION ADULT - OTHER INFO
Pertinent Medical Information: Per H&P, pt is a 58-year-old male past medical history of multiple myeloma diagnosed in 2017 not on any chemotherapy or radiation coming in with complaints of Left flank pain.     Pertinent Subjective Information: Observed breakfast tray this morning. Pt consumed between 50-75% of meal. Tolerating diet texture well. No nausea or vomiting reported. Discussed medical nutrition supplements to optimize kcal/pro intake; pt agreed to receive supplements in-house.     Weight hx: Pt does not recall UBW. Current dosing weight is 56.7 KG.  Pertinent Medical Information: Per H&P, pt is a 58-year-old male past medical history of multiple myeloma diagnosed in 2017 not on any chemotherapy or radiation coming in with complaints of Left flank pain.     Per SLP note on 19-Oct-2023, recommend soft & bite sized diet w/thin liquids.    Pertinent Subjective Information: Observed breakfast tray this morning. Pt consumed between 50-75% of meal. No nausea or vomiting reported. Discussed medical nutrition supplements to optimize kcal/pro intake; pt agreed to receive supplements in-house. Pt states "I only have 4 teeth. They ordered a dental consult because I need dentures."     Weight hx: Pt does not recall UBW. Current dosing weight is 56.7 KG. No previous admission weights in EMR within 1 year. Unable to determine if pt meets weight criteria for malnutrition at this time.

## 2023-10-19 NOTE — PHYSICAL THERAPY INITIAL EVALUATION ADULT - GENERAL OBSERVATIONS, REHAB EVAL
11:30-11:45 Pt encountered in bed, sister present.  Pt refused PT.  Pt and sister were educated on role of PT, importance of facilitating mobility to prevent further debility and complications.
pt declined despite max encouragement. Pt was educated about the importance/benefits of participating in PT and being OOB.  pt continued to decline. He expressed understanding of the importance of participating and recalls a previous hospital stay where he became weak from being in bed too long and wishes to get OOB tomorrow. pt demonstrated exercises that he does on his own in bed and requested PT return tomorrow. PT will f/u as appropriate.
PT IE [id number]. Chart reviewed. Pt encountered semi-reclined in bed. In NAD. + IV lock, + call bell,

## 2023-10-20 NOTE — CONSULT NOTE ADULT - SUBJECTIVE AND OBJECTIVE BOX
Patient is a 58y old  Male who presents with a chief complaint of Left flank pain (20 Oct 2023 13:38)      HPI:  58-year-old male past medical history of multiple myeloma diagnosed in 2017 not on any chemotherapy or radiation coming in with complaints of Left flank pain. The pain started 2 days prior to admission, scale 10/10, on feels the pain while moving, walking, or changing positions, when he stays still there is no pain at all. Patient also has nausea and loss of apetitie for the past few days. He reports that on last week he had several episodes of diarrhea which resolved after taking imodium.    In the ED vitals were stable.  wbc showed mild leukocytosis  troponin were 0.06  EKG shows new III and aVF t wave inversions  CT scan with oral contrast shows no obstruction. It shows multiple lytic lesions of the spine and pelvis, consistent with known   history of multiple myeloma.       (17 Oct 2023 00:10)      PAST MEDICAL & SURGICAL HISTORY:  Multiple myeloma, remission status unspecified      No significant past surgical history        (   ) heart valve replacement  (   ) joint replacement  (   ) pregnancy    MEDICATIONS  (STANDING):  acetaminophen     Tablet .. 975 milliGRAM(s) Oral every 6 hours  chlorhexidine 2% Cloths 1 Application(s) Topical daily  heparin   Injectable 5000 Unit(s) SubCutaneous every 12 hours  methocarbamol 500 milliGRAM(s) Oral three times a day    MEDICATIONS  (PRN):  oxyCODONE    IR 5 milliGRAM(s) Oral every 8 hours PRN Moderate Pain (4 - 6)      Allergies    No Known Allergies    Intolerances        FAMILY HISTORY:  No pertinent family history in first degree relatives        *SOCIAL HISTORY: (   ) Tobacco; (   ) ETOH    *Last Dental Visit:    Vital Signs Last 24 Hrs  T(C): 36.4 (20 Oct 2023 05:18), Max: 36.7 (19 Oct 2023 20:42)  T(F): 97.6 (20 Oct 2023 05:18), Max: 98 (19 Oct 2023 20:42)  HR: 67 (20 Oct 2023 05:18) (67 - 78)  BP: 129/70 (20 Oct 2023 05:18) (129/70 - 139/74)  BP(mean): --  RR: 18 (20 Oct 2023 05:18) (18 - 18)  SpO2: --        LABS:                        9.4    7.09  )-----------( 256      ( 20 Oct 2023 06:55 )             28.7     10-20    138  |  104  |  23<H>  ----------------------------<  74  4.4   |  25  |  2.1<H>    Ca    9.6      20 Oct 2023 06:55  Phos  3.2     10-20  Mg     1.7     10-20    TPro  6.2  /  Alb  3.4<L>  /  TBili  0.3  /  DBili  x   /  AST  18  /  ALT  11  /  AlkPhos  57  10-20    WBC Count: 7.09 K/uL [4.80 - 10.80] (10-20 @ 06:55)  Platelet Count - Automated: 256 K/uL [130 - 400] (10-20 @ 06:55)  WBC Count: 8.43 K/uL [4.80 - 10.80] (10-19 @ 21:42)  Platelet Count - Automated: 248 K/uL [130 - 400] (10-19 @ 21:42)  WBC Count: 8.06 K/uL [4.80 - 10.80] (10-19 @ 07:10)  Platelet Count - Automated: 237 K/uL [130 - 400] (10-19 @ 07:10)  WBC Count: 9.34 K/uL [4.80 - 10.80] (10-18 @ 06:51)  Platelet Count - Automated: 230 K/uL [130 - 400] (10-18 @ 06:51)    Urinalysis Basic - ( 20 Oct 2023 06:55 )    Color: x / Appearance: x / SG: x / pH: x  Gluc: 74 mg/dL / Ketone: x  / Bili: x / Urobili: x   Blood: x / Protein: x / Nitrite: x   Leuk Esterase: x / RBC: x / WBC x   Sq Epi: x / Non Sq Epi: x / Bacteria: x          EOE:  TMJ ( -  ) clicks                     ( -  ) pops                     ( -  ) crepitus             Mandible <<FROM>>             Facial bones and MOM <<grossly intact>>             ( -  ) trismus             ( -  ) lymphadenopathy             ( -  ) swelling             ( -  ) asymmetry             (  - ) palpation             ( -  ) dyspnea             (  - ) dysphagia             ( -  ) loss of consciousness    IOE:  poor dentition, heavy plaque present, enlarged tongue           hard/soft palate:  ( -  ) palatal torus, <<No pathology noted>>           tongue/FOM <<No pathology noted>>           labial/buccal mucosa <<No pathology noted>>           ( -  ) percussion           ( -  ) palpation           (+) swelling           ( - ) abscess           (  - ) sinus tract          *DENTAL RADIOGRAPHS: #21,22, 7,8,9,10 carious lesions, periodontal disease     RADIOLOGY & ADDITIONAL STUDIES: none    *ASSESSMENT: Firm nodular lesions present bilaterally in the buccal mucosa that are most likely not odontogenic in origin.      *PLAN: Referral to ENT for evaluation    PROCEDURE:   none    RECOMMENDATIONS:  1) Referral to ENT for evaluation of swelling  2) Dental F/U with outpatient dentist for comprehensive dental care.   3) If any difficulty swallowing/breathing, fever occur, return to ER.     Bill Servin [phone]

## 2023-10-20 NOTE — PROGRESS NOTE ADULT - ASSESSMENT
57 y/o man with PMH of multiple myeloma diagnosed in 2017 and not on any chemotherapy or radiation and CKD 3B presented with Left flank pain. He was found to have MAC on CKD and elevated troponins and proBNP.     #Multiple myeloma   - pt was on treatment but stopped it 1.5 years ago due to LE swelling  - Anemia likely due MM  - pt now wants to resume therapy - Oncology Dr. Celia Valadez consulted ([phone]) - called 2x 10/19, no answer  - Dr. Celia Valadez's office called back and was informed that Dr. Celia Valadez does not come to [facility], they will send another hematologist who works at [facility]  - f/u heme onc  #Dyspnea on exertion  *unclear etiology at this time - pt thinks it may be due to untreated MM  - ddimer<150  - elevated troponins to 0.07 - pt with CKD  - proBNP 5K  - lower chest of CT scan: atelectasis, trace left pleural effusion  - ECHO: EF 61%  - telemetry - no events    #Lower abdominal and low back spasms   - worse with movement - not at rest  - possibly musculoskeletal  - tylenol 650mg q6h PRN  - robaxin 500mg q8h PRN  - oxycodone IR 5mg q8h PRN for moderate pain    #Malnourished  - patient states difficult to eat without dentures, cheeks are swollen from teeth rubbing into side of cheeks  - patient requesting dentures  - f/u dental consult  - transport was going to bring patient down to dental clinic yesterday (10/19) but patient refused to go because he stated his PCP was coming and he did not want to miss being seen by them    #MAC on CKD 3B - stable  *(baseline in the 2's)  - likely prerenal from diarrhea and now improved  - hypercalcemia now improved  - no hydronephrosis on CT scan  - avoid nephrotoxic meds (NO morphine)  - IVF dced on 10/19 per nephro                                                                                     ----------------------------------------------------  # DVT prophylaxis: Heparin  # GI prophylaxis: NI  # Diet: Soft/Bite Sized  # Activity: AAT  # Disposition: from home                                                                           --------------------------------------------------------    # Handoff:   - f/u Ev onc  - f/u dental consult 57 y/o man with PMH of multiple myeloma diagnosed in 2017 and not on any chemotherapy or radiation and CKD 3B presented with Left flank pain. He was found to have MAC on CKD and elevated troponins and proBNP.     #Multiple myeloma   - pt was on treatment but stopped it 1.5 years ago due to LE swelling  - Anemia likely due MM  - pt now wants to resume therapy - Oncology Dr. Celia Valadez consulted ([phone]) - called 2x 10/19, no answer  - Dr. Celia Valadez's office called back and was informed that Dr. Celia Valadez does not come to [facility], they will send another hematologist (Dr. Yost) who works at [facility]  - 10/20: spoke to Dr. Yost ([phone]), stated will see the patient today in the evening, but patient can follow outpatient if decision was made to start treatment    #Dyspnea on exertion  *unclear etiology at this time - pt thinks it may be due to untreated MM  - ddimer<150  - elevated troponins to 0.07 - pt with CKD  - proBNP 5K  - lower chest of CT scan: atelectasis, trace left pleural effusion  - ECHO: EF 61%  - telemetry - no events  - f/u PT  - per physiatry, home with outpatient or VN services    #Lower abdominal and low back spasms   - worse with movement - not at rest  - possibly musculoskeletal  - tylenol 650mg q6h PRN  - robaxin 500mg q8h PRN  - oxycodone IR 5mg q8h PRN for moderate pain    #Malnourished  - patient states difficult to eat without dentures, cheeks are swollen from teeth rubbing into side of cheeks  - patient requesting dentures  - f/u dental consult  - transport was going to bring patient down to dental clinic yesterday (10/19) but patient refused to go  - 10/20: dental recalled today, will see patient today    #MAC on CKD 3B - stable  *(baseline in the 2's)  - likely prerenal from diarrhea and now improved  - hypercalcemia now improved  - no hydronephrosis on CT scan  - avoid nephrotoxic meds (NO morphine)  - IVF dced on 10/19 per nephro                                                                                     ----------------------------------------------------  # DVT prophylaxis: Heparin  # GI prophylaxis: NI  # Diet: Soft/Bite Sized  # Activity: AAT  # Disposition: from home                                                                           --------------------------------------------------------    # Handoff:   - f/u Heme onc  - poss dc tomorrow

## 2023-10-20 NOTE — CONSULT NOTE ADULT - SUBJECTIVE AND OBJECTIVE BOX
Patient is a 58y old  Male who presents with a chief complaint of Left flank pain (20 Oct 2023 14:51)      HPI:  58-year-old male past medical history of multiple myeloma diagnosed in 2017 not on any chemotherapy or radiation coming in with complaints of Left flank pain. The pain started 2 days prior to admission, scale 10/10, on feels the pain while moving, walking, or changing positions, when he stays still there is no pain at all. Patient also has nausea and loss of apetitie for the past few days. He reports that on last week he had several episodes of diarrhea which resolved after taking imodium.    In the ED vitals were stable.  wbc showed mild leukocytosis  troponin were 0.06  EKG shows new III and aVF t wave inversions  CT scan with oral contrast shows no obstruction. It shows multiple lytic lesions of the spine and pelvis, consistent with known   history of multiple myeloma.       (17 Oct 2023 00:10)       ROS:  Negative except for:back pain     PAST MEDICAL & SURGICAL HISTORY:  Multiple myeloma, remission status unspecified      No significant past surgical history          SOCIAL HISTORY:    FAMILY HISTORY:  No pertinent family history in first degree relatives        MEDICATIONS  (STANDING):  acetaminophen     Tablet .. 975 milliGRAM(s) Oral every 6 hours  chlorhexidine 2% Cloths 1 Application(s) Topical daily  heparin   Injectable 5000 Unit(s) SubCutaneous every 12 hours  methocarbamol 500 milliGRAM(s) Oral three times a day    MEDICATIONS  (PRN):  oxyCODONE    IR 5 milliGRAM(s) Oral every 8 hours PRN Moderate Pain (4 - 6)      Allergies    No Known Allergies    Intolerances        Vital Signs Last 24 Hrs  T(C): 36.4 (20 Oct 2023 05:18), Max: 36.7 (19 Oct 2023 20:42)  T(F): 97.6 (20 Oct 2023 05:18), Max: 98 (19 Oct 2023 20:42)  HR: 67 (20 Oct 2023 05:18) (67 - 78)  BP: 129/70 (20 Oct 2023 05:18) (129/70 - 139/74)  BP(mean): --  RR: 18 (20 Oct 2023 05:18) (18 - 18)  SpO2: --        PHYSICAL EXAM  General: adult in NAD  HEENT: clear oropharynx, anicteric sclera, pink conjunctiva  Neck: supple  CV: normal S1/S2 with no murmur rubs or gallops  Lungs: positive air movement b/l ant lungs,clear to auscultation, no wheezes, no rales  Abdomen: soft non-tender non-distended, no hepatosplenomegaly  Ext: no clubbing cyanosis or edema  Skin: no rashes and no petechiae  Neuro: alert and oriented X 4, no focal deficits      LABS:                          9.4    7.09  )-----------( 256      ( 20 Oct 2023 06:55 )             28.7         Mean Cell Volume : 95.3 fL  Mean Cell Hemoglobin : 31.2 pg  Mean Cell Hemoglobin Concentration : 32.8 g/dL  Auto Neutrophil # : 3.47 K/uL  Auto Lymphocyte # : 2.14 K/uL  Auto Monocyte # : 0.80 K/uL  Auto Eosinophil # : 0.58 K/uL  Auto Basophil # : 0.03 K/uL  Auto Neutrophil % : 48.9 %  Auto Lymphocyte % : 30.2 %  Auto Monocyte % : 11.3 %  Auto Eosinophil % : 8.2 %  Auto Basophil % : 0.4 %      Serial CBC's  10-20 @ 06:55  Hct-28.7 / Hgb-9.4 / Plat-256 / RBC-3.01 / WBC-7.09  Serial CBC's  10-19 @ 21:42  Hct-26.9 / Hgb-9.0 / Plat-248 / RBC-2.83 / WBC-8.43  Serial CBC's  10-19 @ 07:10  Hct-27.2 / Hgb-9.2 / Plat-237 / RBC-2.84 / WBC-8.06  Serial CBC's  10-18 @ 06:51  Hct-27.8 / Hgb-9.1 / Plat-230 / RBC-2.87 / WBC-9.34  Serial CBC's  10-17 @ 07:20  Hct-31.2 / Hgb-10.2 / Plat-242 / RBC-3.21 / WBC-10.86      10-20    138  |  104  |  23<H>  ----------------------------<  74  4.4   |  25  |  2.1<H>    Ca    9.6      20 Oct 2023 06:55  Phos  3.2     10-20  Mg     1.7     10-20    TPro  6.2  /  Alb  3.4<L>  /  TBili  0.3  /  DBili  x   /  AST  18  /  ALT  11  /  AlkPhos  57  10-20              SONNY Kappa: 0.36 mg/dL (10-19 @ 07:10)  SONNY Lambda: 779.62 mg/dL (10-19 @ 07:10)          BLOOD SMEAR INTERPRETATION:       RADIOLOGY & ADDITIONAL STUDIES:

## 2023-10-20 NOTE — PROGRESS NOTE ADULT - ASSESSMENT
59 y/o man with PMH of multiple myeloma diagnosed in 2017 and not on any chemotherapy or radiation and CKD 3B presented with Left flank pain. He was found to have MAC on CKD and elevated troponins and proBNP.     # CKD3B likely due to MM ( baseline 1.9-2.1)- at baseline  monitor BMP    # Hypercalcemia resolved     # hypomagnesemia- replaced and monitor    # Multiple myeloma stopped treatment   oncology consulted- will await evaluation by Dr Yost    # acute on chronic- normocytic- posisbly related to MM  monitor b12 , folate    # Lower abdominal and low back spasms due to lytic lesion from MM and hypercalcemia   continue PRN pain medicaiton  continue robaxin- currently TID  PT consult    # Dyspnea on exertion- possible due to anemia   . echo EF 60% no ventricular or valve dysfunction.   trops nild stable elevation ( due to CKD-)ekg no ischemic event    # Severe Malnourished with dysphagia     # B/l cheek swelling- unsure whether related to dental irritation of buccal mucosa- but no mucosal erosion seen  poor dental hygiene  dental consulted    #Malnourished  nutrition consult  dietary supplement                               # DVT prophylaxis: Heparin  Plan of care d/w patient    Pendihg: PT eval for ambulation,  dental eval, oncology recommendation,

## 2023-10-20 NOTE — PROGRESS NOTE ADULT - ASSESSMENT
CKD stage 3-4 with proteinuria   no hydro on CT abdomen  Cr 1.9 (6/2021)  etiology likely due to MM  multiple bone lytic lesions with bony / musculoskeletal pain  mild hypercalcemia, improved   s/p kyphoplasty  anasarca on CT abd likely from 3rd spacing   anemia  poor dentition / malnutrition / hypoalbuminemia  EF 61% / nl echo    plan:    f/u MM studies (SPEP, SFLC, UPEP, urine immunofixation)  f/u onc, Dr. Yost to see pt today  pt counselled on avoiding further NSAIDs  encourage po hydration  dental eval pending  outpatient renal follow up  d/w sister

## 2023-10-20 NOTE — CONSULT NOTE ADULT - ASSESSMENT
57 y/o man with PMH of multiple myeloma diagnosed in 2017 and not on any chemotherapy or radiation (since May 2022) and CKD 3B presented with Left flank pain. He was found to have MAC on CKD and elevated troponins and proBNP.   last seen by DR Valadez >1 yr back   stopped Rx on his own     # CKD3B likely sec to MM  ( baseline 1.9-2.1).   # Hypercalcemia resolved     # hypomagnesemia- replace as indicated    # Multiple myeloma stopped treatment on his own >1.5 yr ago   willing to restart Rx now  very noncompliant   emphasized on compliance.  send spep with SONNY  upep with SONNY  Ig panel   beta 2 microglobulin   FLC ratio noted.    # acute on chronic- normocytic anemia - likley multifactorial including  MM  check ferritin /iron panel ,retsic count  b12 , folate    # Lower abdominal and low back spasms without any weakness of LE or bowel/bladder incontinence likely  due to lytic lesion from MM and hypercalcemia   pain Mn eval   on  robaxin- currently TID  CT abdo showed multiple lytic lesion in spine ,s/p kyphoplasty T10/11,L4/5  consider MRI T/L spine with iv contrast if cr improves  PT eval   will get pet/ct as outpt to assess dz burden if pt follows up as outpt     mildly elevated trop, stable elevation ( due to CKD-)  ekg no ischemic event    # Severe Malnourished with dysphagia     # B/l cheek swelling- seen by dental     #Malnourished  nutrition consult    cont other supportive care     spoke with house staff earlier this morning   will f/u                          57 y/o man with PMH of multiple myeloma diagnosed in 2017 and not on any chemotherapy or radiation (since May 2022) and CKD 3B presented with Left flank pain. He was found to have MAC on CKD and elevated troponins and proBNP.   last seen by DR Valadez >1 yr back   stopped Rx on his own     # CKD3B likely sec to MM  ( baseline 1.9-2.1).   # Hypercalcemia resolved     # hypomagnesemia- replace as indicated    # Multiple myeloma stopped treatment on his own >1.5 yr ago   willing to restart Rx now  very noncompliant   emphasized on compliance.  send spep with SONNY  upep with SONNY  Ig panel   beta 2 microglobulin   FLC ratio noted.    # acute on chronic- normocytic anemia - likley multifactorial including  MM  check ferritin /iron panel ,retsic count  b12 , folate    # Lower abdominal and low back spasms without any weakness of LE or bowel/bladder incontinence likely  due to lytic lesion from MM and hypercalcemia   pain Mn eval   on  robaxin- currently TID  CT abdo showed multiple lytic lesion in spine ,s/p kyphoplasty T11/L1,L4/5  consider MRI T/L spine with iv contrast if cr improves  PT eval   will get pet/ct as outpt to assess dz burden if pt follows up as outpt     mildly elevated trop, stable elevation ( due to CKD-)  ekg no ischemic event    # Severe Malnourished with dysphagia     # B/l cheek swelling- seen by dental     #Malnourished  nutrition consult    cont other supportive care     spoke with house staff earlier this morning   will f/u

## 2023-10-20 NOTE — DISCHARGE NOTE NURSING/CASE MANAGEMENT/SOCIAL WORK - PATIENT PORTAL LINK FT
You can access the FollowMyHealth Patient Portal offered by [facility] by registering at the following website: [email or web address]. By joining [facility]’s FollowMyHealth portal, you will also be able to view your health information using other applications (apps) compatible with our system.

## 2023-10-21 NOTE — PROGRESS NOTE ADULT - ASSESSMENT
57 y/o man with PMH of multiple myeloma diagnosed in 2017 and not on any chemotherapy or radiation and CKD 3B presented with Left flank pain. He was found to have MAC on CKD and elevated troponins and proBNP.     #Multiple myeloma   - pt was on treatment but stopped it 1.5 years ago due to LE swelling  - Anemia likely due MM  - pt now wants to resume therapy  - 10/20: spoke to Dr. Yost ([phone]), patient can follow outpatient if decision was made to start treatment  - f/u MM labs    #Dyspnea on exertion  *unclear etiology at this time - pt thinks it may be due to untreated MM  - ddimer<150  - elevated troponins to 0.07 - pt with CKD  - proBNP 5K  - lower chest of CT scan: atelectasis, trace left pleural effusion  - ECHO: EF 61%  - telemetry - no events  - f/u PT  - per physiatry, home with outpatient or VN services    #Lower abdominal and low back spasms   - worse with movement - not at rest  - possibly musculoskeletal  - tylenol 650mg q6h PRN  - robaxin 500mg q8h PRN  - oxycodone IR 5mg q8h PRN for moderate pain    #Malnourished  - patient states difficult to eat without dentures, cheeks are swollen from teeth rubbing into side of cheeks  - patient requesting dentures  - f/u dental consult  1) Referral to ENT for evaluation of swelling  2) Dental F/U with outpatient dentist for comprehensive dental care.   3) If any difficulty swallowing/breathing, fever occur, return to ER.       #MAC on CKD 3B - stable  *(baseline in the 2's)  - likely prerenal from diarrhea and now improved  - hypercalcemia now improved  - no hydronephrosis on CT scan  - avoid nephrotoxic meds (NO morphine)  - IVF dced on 10/19 per nephro                                                                                     ----------------------------------------------------  # DVT prophylaxis: Heparin  # GI prophylaxis: NI  # Diet: Soft/Bite Sized  # Activity: AAT  # Disposition: from home                                                                           --------------------------------------------------------    # Handoff:   - pt refusing dc today, wants to speak to patient experiences/relations  - anticipate every day

## 2023-10-21 NOTE — DISCHARGE NOTE PROVIDER - PROVIDER TOKENS
PROVIDER:[TOKEN:[[id number]],ESTABLISHEDPATIENT:[T]],PROVIDER:[TOKEN:[[id number]],ESTABLISHEDPATIENT:[T]],PROVIDER:[TOKEN:[[id number]]] PROVIDER:[TOKEN:[[id number]],ESTABLISHEDPATIENT:[T]],PROVIDER:[TOKEN:[[id number]],ESTABLISHEDPATIENT:[T]],PROVIDER:[TOKEN:[[id number]]],PROVIDER:[TOKEN:[[id number]],FOLLOWUP:[1 week]],PROVIDER:[TOKEN:[[id number]],FOLLOWUP:[1 week]]

## 2023-10-21 NOTE — DISCHARGE NOTE PROVIDER - NSDCMRMEDTOKEN_GEN_ALL_CORE_FT
folic acid 1 mg oral tablet: 1 tab(s) orally once a day  senna (sennosides) 17 mg oral tablet: 2 tab(s) orally once a day   acetaminophen 325 mg oral tablet: 2 tab(s) orally every 6 hours as needed for  moderate pain  folic acid 1 mg oral tablet: 1 tab(s) orally once a day  methocarbamol 500 mg oral tablet: 1 tab(s) orally 3 times a day  senna (sennosides) 17 mg oral tablet: 2 tab(s) orally once a day

## 2023-10-21 NOTE — DISCHARGE NOTE PROVIDER - NSDCFUADDINST_GEN_ALL_CORE_FT
Follow up with your hematologist outpatient to resume treatment for multiple myeloma.  Follow up with dental  Follow up with endocrinology regarding the swelling of the cheeks Follow up with your hematologist outpatient to resume treatment for multiple myeloma.  Follow up with dental  Follow up with ENT regarding the swelling of the cheeks

## 2023-10-21 NOTE — DISCHARGE NOTE PROVIDER - NSFOLLOWUPCLINICS_GEN_ALL_ED_FT
[facility], formerly [facility]  Dental  [address] [place] [id number]  Phone: [phone]  Fax:

## 2023-10-21 NOTE — DISCHARGE NOTE PROVIDER - NSDCCPCAREPLAN_GEN_ALL_CORE_FT
PRINCIPAL DISCHARGE DIAGNOSIS  Diagnosis: Abdominal pain  Assessment and Plan of Treatment: You were evaluated in the hospital for your flank pain, which resolved, and now you were interested in resuming treatment for multiple myeloma.   You were seen by Dr. Yost inpatient and is recommended to follow up outpatient with your hematologist. You were evaluated for the bilateral cheek swelling by dental, and you are recommended to follow up as outpatient.   After discharge, you will need to:   - Follow up with your primary care doctor within 1-2 weeks  - Follow up with your hematologist.  - Follow up with your dentist.  - Follow up in our outpatient endocrinology clinic.  - Take all the medications you were discharged with, unless otherwise instructed by your healthcare provider(s).   Please follow up with your providers by calling them to make an appointment so that you can see them in 1-2weeks; bring your paperwork from this hospital stay to that visit. You can access your visit information by signing up for an account for the patient portal at [email or web address]   Seek immediate medical attention if you develop fevers, chills, chest pain, shortness of breath, nausea and vomiting, abdominal pain, passing out, weakness or numbness or tingling on one side of your body, or any other concerning signs or symptoms.      SECONDARY DISCHARGE DIAGNOSES  Diagnosis: MAC (acute kidney injury)  Assessment and Plan of Treatment:     Diagnosis: Multiple myeloma  Assessment and Plan of Treatment:     Diagnosis: Elevated troponin  Assessment and Plan of Treatment:      PRINCIPAL DISCHARGE DIAGNOSIS  Diagnosis: Abdominal pain  Assessment and Plan of Treatment: You were evaluated in the hospital for your flank pain, which resolved, and now you were interested in resuming treatment for multiple myeloma.   Please also follow up with nephrology and ENT, contact information has been provided.  You were seen by Dr. Yost inpatient and is recommended to follow up outpatient with your hematologist. You were evaluated for the bilateral cheek swelling by dental, and you are recommended to follow up as outpatient.   After discharge, you will need to:   - Follow up with your primary care doctor within 1-2 weeks  - Follow up with your hematologist.  - Follow up with your dentist.  - Follow up in our outpatient endocrinology clinic.  - Take all the medications you were discharged with, unless otherwise instructed by your healthcare provider(s).   Please follow up with your providers by calling them to make an appointment so that you can see them in 1-2weeks; bring your paperwork from this hospital stay to that visit. You can access your visit information by signing up for an account for the patient portal at [email or web address]   Seek immediate medical attention if you develop fevers, chills, chest pain, shortness of breath, nausea and vomiting, abdominal pain, passing out, weakness or numbness or tingling on one side of your body, or any other concerning signs or symptoms.      SECONDARY DISCHARGE DIAGNOSES  Diagnosis: MAC (acute kidney injury)  Assessment and Plan of Treatment:     Diagnosis: Multiple myeloma  Assessment and Plan of Treatment:     Diagnosis: Elevated troponin  Assessment and Plan of Treatment:      PRINCIPAL DISCHARGE DIAGNOSIS  Diagnosis: Abdominal pain  Assessment and Plan of Treatment: You were evaluated in the hospital for your flank pain, which resolved, and now you were interested in resuming treatment for multiple myeloma.   Please also follow up with nephrology and ENT, contact information has been provided.  Please have your cmp rechecked within 3 days.  You were seen by Dr. Yost inpatient and is recommended to follow up outpatient with your hematologist. You were evaluated for the bilateral cheek swelling by dental, and you are recommended to follow up as outpatient.   After discharge, you will need to:   - Follow up with your primary care doctor within 1-2 weeks  - Follow up with your hematologist.  - Follow up with your dentist.  - Follow up in our outpatient endocrinology clinic.  - Take all the medications you were discharged with, unless otherwise instructed by your healthcare provider(s).   Please follow up with your providers by calling them to make an appointment so that you can see them in 1-2weeks; bring your paperwork from this hospital stay to that visit. You can access your visit information by signing up for an account for the patient portal at [email or web address]   Seek immediate medical attention if you develop fevers, chills, chest pain, shortness of breath, nausea and vomiting, abdominal pain, passing out, weakness or numbness or tingling on one side of your body, or any other concerning signs or symptoms.      SECONDARY DISCHARGE DIAGNOSES  Diagnosis: MAC (acute kidney injury)  Assessment and Plan of Treatment:     Diagnosis: Multiple myeloma  Assessment and Plan of Treatment:     Diagnosis: Elevated troponin  Assessment and Plan of Treatment:      PRINCIPAL DISCHARGE DIAGNOSIS  Diagnosis: Abdominal pain  Assessment and Plan of Treatment: You were evaluated in the hospital for your flank pain, which resolved, and now you were interested in resuming treatment for multiple myeloma.   Please also follow up with nephrology and ENT, contact information has been provided.  Please have your cmp rechecked within 3 days.  You were seen by Dr. Yost inpatient and is recommended to follow up outpatient with your hematologist. You were evaluated for the bilateral cheek swelling by dental, and you are recommended to follow up as outpatient.   After discharge, you will need to:   - Follow up with nephrology and have your cmp checked wtihin 3 days, please let Dr. Garcia (nephrology) know the result  - Follow up with your primary care doctor within 1-2 weeks  - Follow up with your hematologist.  - Follow up with your dentist.  - Follow up in our outpatient endocrinology clinic.  - Take all the medications you were discharged with, unless otherwise instructed by your healthcare provider(s).   Please follow up with your providers by calling them to make an appointment so that you can see them in 1-2weeks; bring your paperwork from this hospital stay to that visit. You can access your visit information by signing up for an account for the patient portal at [email or web address]   Seek immediate medical attention if you develop fevers, chills, chest pain, shortness of breath, nausea and vomiting, abdominal pain, passing out, weakness or numbness or tingling on one side of your body, or any other concerning signs or symptoms.      SECONDARY DISCHARGE DIAGNOSES  Diagnosis: MAC (acute kidney injury)  Assessment and Plan of Treatment:     Diagnosis: Multiple myeloma  Assessment and Plan of Treatment:     Diagnosis: Elevated troponin  Assessment and Plan of Treatment:

## 2023-10-21 NOTE — DISCHARGE NOTE PROVIDER - NPI NUMBER (FOR SYSADMIN USE ONLY) :
[[id number]] [[id number]],[[id number]],[[id number]],[[id number]]

## 2023-10-21 NOTE — DISCHARGE NOTE PROVIDER - CARE PROVIDERS DIRECT ADDRESSES
,DirectAddress_Unknown,DirectAddress_Unknown,[email or web address] ,DirectAddress_Unknown,DirectAddress_Unknown,[email or web address],DirectAddress_Unknown

## 2023-10-21 NOTE — DISCHARGE NOTE PROVIDER - CARE PROVIDER_API CALL
LOLY VARNER  [address] [place] [address]  Phone: [phone]  Fax: [phone]  Established Patient  Follow Up Time:     Celia Valadez  Hematology  [address] [place] [address]  Phone: [phone]  Fax: [phone]  Established Patient  Follow Up Time:     Vidhi Lugo  Endocrinology/Metab/Diabetes  [address] [place] [address]  Phone: [phone]  Fax: [phone]  Follow Up Time:    LOLY VARNER  [address] [place] [address]  Phone: [phone]  Fax: [phone]  Established Patient  Follow Up Time:     Celia Valadez  Hematology  [address] [place] [address]  Phone: [phone]  Fax: [phone]  Established Patient  Follow Up Time:     Vidhi Lugo  Endocrinology/Metab/Diabetes  [address] [place] [address]  Phone: [phone]  Fax: [phone]  Follow Up Time:     Aiden Timmons  Otolaryngology  [address] [place] [address], [address]  [place] [address]  Phone: [phone]  Fax: [phone]  Follow Up Time: 1 week    Ministerio Garcia  Nephrology  [address] [place] [address]  Phone: [phone]  Fax: [phone]  Follow Up Time: 1 week

## 2023-10-21 NOTE — DISCHARGE NOTE PROVIDER - HOSPITAL COURSE
58-year-old male past medical history of multiple myeloma diagnosed in 2017 not on any chemotherapy or radiation coming in with complaints of Left flank pain. The pain started 2 days prior to admission, scale 10/10, on feels the pain while moving, walking, or changing positions, when he stays still there is no pain at all. Patient also has nausea and loss of apetitie for the past few days. He reports that on last week he had several episodes of diarrhea which resolved after taking imodium.    In the ED vitals were stable.  wbc showed mild leukocytosis  troponin were 0.06  EKG shows new III and aVF t wave inversions  CT scan with oral contrast shows no obstruction. It shows multiple lytic lesions of the spine and pelvis, consistent with known   history of multiple myeloma.      Patient was admitted for further evaluation. Over the course of the stay, patient's L flank pain improved drastically and patient endorsed wanting to start treatment for multiple myeloma again. Patient was previously following Dr. Valadez but stopped treatment about a year ago. Patient was seen by Dr. Yost inpatient. Patient is to follow with outpatient treatment. Patient also endorsed swelling of cheeks and difficulty chewing due to lack of teeth. Dental was consulted and patient is recommended to follow up outpatient. When patient presented, he also had an MAC which was resolved with adequate hydration. Fluids were dced once Cr levels improved and patient increased PO intake. Since admission, patient's symptoms have improved and patient is medically stable for discharge.    #Multiple myeloma   - pt was on treatment but stopped it 1.5 years ago due to LE swelling  - Anemia likely due MM  -  #Dyspnea on exertion  *unclear etiology at this time - pt thinks it may be due to untreated MM  - ddimer<150  - elevated troponins to 0.07 - pt with CKD  - proBNP 5K  - lower chest of CT scan: atelectasis, trace left pleural effusion  - ECHO: EF 61%  - telemetry - no events  - f/u PT  - per physiatry, home with outpatient or VN services    #Lower abdominal and low back spasms   - worse with movement - not at rest  - possibly musculoskeletal  - tylenol 650mg q6h PRN  - robaxin 500mg q8h PRN  - oxycodone IR 5mg q8h PRN for moderate pain    #Malnourished  - patient states difficult to eat without dentures, cheeks are swollen from teeth rubbing into side of cheeks  - patient requesting dentures  - f/u dental consult  - transport was going to bring patient down to dental clinic yesterday (10/19) but patient refused to go  - 10/20: dental recalled today, will see patient today    #MAC on CKD 3B - stable  *(baseline in the 2's)  - likely prerenal from diarrhea and now improved  - hypercalcemia now improved  - no hydronephrosis on CT scan  - avoid nephrotoxic meds (NO morphine)  - IVF dced on 10/19 per nephro 58-year-old male past medical history of multiple myeloma diagnosed in 2017 not on any chemotherapy or radiation coming in with complaints of Left flank pain. The pain started 2 days prior to admission, scale 10/10, on feels the pain while moving, walking, or changing positions, when he stays still there is no pain at all. Patient also has nausea and loss of apetitie for the past few days. He reports that on last week he had several episodes of diarrhea which resolved after taking imodium.    In the ED vitals were stable.  wbc showed mild leukocytosis  troponin were 0.06  EKG shows new III and aVF t wave inversions  CT scan with oral contrast shows no obstruction. It shows multiple lytic lesions of the spine and pelvis, consistent with known   history of multiple myeloma.      Patient was admitted for further evaluation. Over the course of the stay, patient's L flank pain improved drastically and patient endorsed wanting to start treatment for multiple myeloma again. Patient was previously following Dr. Valadez but stopped treatment about a year ago. Patient was seen by Dr. Yost inpatient. Patient is to follow with outpatient treatment. Patient also endorsed swelling of cheeks and difficulty chewing due to lack of teeth. Dental was consulted and patient is recommended to follow up outpatient. When patient presented, he also had an MAC which was resolved with adequate hydration. Fluids were dced once Cr levels improved and patient increased PO intake. Since admission, patient's symptoms have improved and patient is medically stable for discharge.    59 y/o man with PMH of multiple myeloma diagnosed in 2017 and not on any chemotherapy or radiation and CKD 3B presented with Left flank pain. He was found to have MAC on CKD and elevated troponins and proBNP.     # dysphagia to pills and solid food  speech therapy consulted- vfss done     # CKD3B likely due to MM ( baseline 1.9-2.1)- at baseline  # Hypercalcemia resolved   # hypomagnesemia- replaced and improved    # Multiple myeloma stopped treatment >1.5 years  oncology evaluated  willing to restart Rx now  - will follow up with heme onc as outpatient    # acute on chronic- normocytic- possibly related to MM  # folate deficiency  nl b12  folate- low- on supplement    # Lower abdominal and low back spasms due to lytic lesion from MM and hypercalcemia   continue PRN pain medication  continue robaxin- currently TID  PT following    # Dyspnea on exertion- stable  echo EF 60% no ventricular or valve dysfunction.   trops mild stable elevation ( due to CKD-)ekg no ischemic event    # Severe Malnourished with dysphagia     # B/l buccal nodular swelling-  poor dental hygiene  s/p dental eval- recommended ENT eval- team d/w patient for OP follow up                              # DVT prophylaxis: Heparin  Plan of care d/w patient    Pending: auth for STR, VFSS today .   58-year-old male past medical history of multiple myeloma diagnosed in 2017 not on any chemotherapy or radiation coming in with complaints of Left flank pain. The pain started 2 days prior to admission, scale 10/10, on feels the pain while moving, walking, or changing positions, when he stays still there is no pain at all. Patient also has nausea and loss of apetitie for the past few days. He reports that on last week he had several episodes of diarrhea which resolved after taking imodium.    CT scan with oral contrast shows no obstruction. It shows multiple lytic lesions of the spine and pelvis, consistent with known   history of multiple myeloma.    Patient was admitted for further evaluation. Over the course of the stay, patient's L flank pain improved drastically and patient endorsed wanting to start treatment for multiple myeloma again. Patient was previously following Dr. Valadez but stopped treatment about a year ago. Patient is to follow with outpatient treatment. Patient also endorsed swelling of cheeks and difficulty chewing due to lack of teeth. Dental was consulted and patient is recommended to follow up outpatient. When patient presented, he also had an MAC which was resolved with adequate hydration. Fluids were dced once Cr levels improved and patient increased PO intake. Since admission, patient's symptoms have improved and patient is medically stable for discharge.    59 y/o man with PMH of multiple myeloma diagnosed in 2017 and not on any chemotherapy or radiation and CKD 3B presented with Left flank pain. He was found to have MAC on CKD and elevated troponins and proBNP.     # dysphagia to pills and solid food  speech therapy consulted- vfss done --> minced + moist diet    # CKD3B likely due to MM ( baseline 1.9-2.1)- at baseline  # Hypercalcemia   # hypomagnesemia- replaced and improved    # Multiple myeloma stopped treatment >1.5 years  oncology evaluated  willing to restart Rx  will follow up with heme onc as outpatient    # acute on chronic- normocytic- possibly related to MM  # folate deficiency  nl b12  folate- low- on supplement    #Lower abdominal and low back spasms due to lytic lesion from MM and hypercalcemia   continue PRN pain medication  continue robaxin- currently TID  PT following    # B/l buccal nodular swelling-  poor dental hygiene  s/p dental eval- recommended ENT eval- team d/w patient for OP follow up      Follow up with hemonc Dr Valadez as outpatient for therapy  F/U with ENT as out for evaluation of buccal nodular swelling  Dental F/U with outpatient dentist for comprehensive dental care.

## 2023-10-22 NOTE — PROGRESS NOTE ADULT - ASSESSMENT
59 y/o man with PMH of multiple myeloma diagnosed in 2017 and not on any chemotherapy or radiation and CKD 3B presented with Left flank pain. He was found to have MAC on CKD and elevated troponins and proBNP.     # constipation  start on senna and miralax  start on lactulose; if no bowel movement- then dulcolax supp    # CKD3B likely due to MM ( baseline 1.9-2.1)- at baseline  monitor BMP    # Hypercalcemia resolved     # hypomagnesemia- replaced and improved    # Multiple myeloma stopped treatment >1.5 years  oncology evaluated  willing to restart Rx now  - spep with SONNY, upep with SONNY, Ig panel, FLC raio, beta 2 microglobulin   # acute on chronic- normocytic- possibly related to MM  monitor b12 , folate    # Lower abdominal and low back spasms due to lytic lesion from MM and hypercalcemia   continue PRN pain medicaiton  continue robaxin- currently TID  PT consult    # Dyspnea on exertion- improved   echo EF 60% no ventricular or valve dysfunction.   trops mild stable elevation ( due to CKD-)ekg no ischemic event    # Severe Malnourished with dysphagia     # B/l buccal nodula swelling- u  poor dental hygiene  s/p dental eval- recommended ENT eval- d/w patient for OP follow up                                 # DVT prophylaxis: Heparin  Plan of care d/w patient    Pending: prepare for discharge; case management eval for DME

## 2023-10-22 NOTE — PROGRESS NOTE ADULT - ASSESSMENT
57 y/o man with PMH of multiple myeloma diagnosed in 2017 and not on any chemotherapy or radiation (since May 2022) and CKD 3B presented with Left flank pain. He was found to have MAC on CKD and elevated troponins and proBNP.   last seen by DR Valadez >1 yr back   stopped Rx on his own     # CKD3B likely sec to MM  ( baseline 1.9-2.1).   # Hypercalcemia resolved     # hypomagnesemia- replace as indicated    # acute on chronic- normocytic anemia - likley multifactorial including  MM  check ferritin /iron panel ,retsic count  b12 , folate    # Lower abdominal and low back spasms without any weakness of LE or bowel/bladder incontinence likely  due to lytic lesion from MM and hypercalcemia   pain Mn eval   on  robaxin- currently TID  CT abdo showed multiple lytic lesion in spine ,s/p kyphoplasty T11/L1,L4/5  consider MRI T/L spine with iv contrast if cr improves  PT eval   will get pet/ct as outpt to assess dz burden if pt follows up as outpt     mildly elevated trop, stable elevation ( due to CKD-)  ekg no ischemic even    agreed to  start Myeloma  tx  as  OPD  ,  will  need baseline  studies  and repeat BMB bx  and  Asp.

## 2023-10-23 NOTE — PROGRESS NOTE ADULT - ASSESSMENT
CKD stage 3-4 with proteinuria   Cr 1.9 (6/2021)  etiology likely due to MM  multiple bone lytic lesions with bony / musculoskeletal pain  mild hypercalcemia, improved   s/p kyphoplasty  anasarca on CT abd likely from 3rd spacing   anemia  poor dentition / malnutrition / hypoalbuminemia  EF 61% / nl echo    plan:    outpt renal f/u  keep orally hydrated   f/u oncology   avoid further NSAIDs  physical therapy  possible STR

## 2023-10-23 NOTE — PROGRESS NOTE ADULT - ASSESSMENT
57 y/o man with PMH of multiple myeloma diagnosed in 2017 and not on any chemotherapy or radiation and CKD 3B presented with Left flank pain. He was found to have MAC on CKD and elevated troponins and proBNP.     # constipation- resolved  had bowel movements yesterday- states had 2 episodes of loose stools  hold laxatives    # CKD3B likely due to MM ( baseline 1.9-2.1)- at baseline  # Hypercalcemia resolved   # hypomagnesemia- replaced and improved    # Multiple myeloma stopped treatment >1.5 years  oncology evaluated  willing to restart Rx now  - spep with SONNY, upep with SONNY, Ig panel, FLC raio, beta 2 microglobulin - collected  # acute on chronic- normocytic- possibly related to MM  # folate deficiency  nl b12  folate- low- on supplement  check iron studies, ferritin, RC    # Lower abdominal and low back spasms due to lytic lesion from MM and hypercalcemia   continue PRN pain medicaiton  continue robaxin- currently TID  PT following    # Dyspnea on exertion- stable  echo EF 60% no ventricular or valve dysfunction.   trops mild stable elevation ( due to CKD-)ekg no ischemic event    # Severe Malnourished with dysphagia     # B/l buccal nodular swelling-  poor dental hygiene  s/p dental eval- recommended ENT eval- d/w patient for OP follow up                              # DVT prophylaxis: Heparin  Plan of care d/w patient    Pending: eval for STR, discharge planning

## 2023-10-24 NOTE — SWALLOW BEDSIDE ASSESSMENT ADULT - SWALLOW EVAL: PATIENT/FAMILY GOALS STATEMENT
Pt. endorses difficulties chewing hard solids 2' lack of dentures, denies dysphagia.
Pt. endorses difficulties chewing and swallowing. Friends state pt has had recent weight loss

## 2023-10-24 NOTE — SWALLOW BEDSIDE ASSESSMENT ADULT - SWALLOW EVAL: DIAGNOSIS
Mild/mod oral dysphagia with easy to chew solids, + tolerance for soft & bite sized with thin liquids without overt s/s of penetration/ aspiration.
+ toleration of minced & moist and thin liquids w/o overt s/s of aspiration/penetration. Pt refused further PO trials 2/2 globus sensation

## 2023-10-24 NOTE — SWALLOW BEDSIDE ASSESSMENT ADULT - CONSISTENCIES ADMINISTERED
3oz water, 2oz easy to chew, 2oz puree/thin liquid/soft & bite-sized/easy to chew thin liquid/minced & moist

## 2023-10-24 NOTE — PROGRESS NOTE ADULT - ASSESSMENT
CKD stage 3-4 with proteinuria   Cr 1.9 (6/2021)  etiology likely due to MM  multiple bone lytic lesions with bony / musculoskeletal pain  mild hypercalcemia, improved   s/p kyphoplasty  anasarca on CT abd likely from 3rd spacing   anemia  poor dentition / malnutrition / hypoalbuminemia  EF 61% / nl echo    plan:    outpt renal f/u  keep orally hydrated   f/u oncology   avoid further NSAIDs  no ACE-I / ARB  physical therapy  possible STR

## 2023-10-24 NOTE — PROGRESS NOTE ADULT - ASSESSMENT
57 y/o man with PMH of multiple myeloma diagnosed in 2017 and not on any chemotherapy or radiation and CKD 3B presented with Left flank pain. He was found to have MAC on CKD and elevated troponins and proBNP.     # constipation- resolved  had bowel movements yesterday- states had 2 episodes of loose stools  hold laxatives    # CKD3B likely due to MM ( baseline 1.9-2.1)- at baseline  # Hypercalcemia resolved   # hypomagnesemia- replaced and improved    # Multiple myeloma stopped treatment >1.5 years  oncology evaluated  willing to restart Rx now  - spep with SONNY, upep with SONNY, Ig panel, FLC raio, beta 2 microglobulin - collected    # acute on chronic- normocytic- possibly related to MM  # folate deficiency  nl b12  folate- low- on supplement  check iron studies, ferritin, RC    # Lower abdominal and low back spasms due to lytic lesion from MM and hypercalcemia   continue PRN pain medicaiton  continue robaxin- currently TID  PT following    # Dyspnea on exertion- stable  echo EF 60% no ventricular or valve dysfunction.   trops mild stable elevation ( due to CKD-)ekg no ischemic event    # Severe Malnourished with dysphagia   Swallowing assessment  Barium swallow ordered  F/U                                # DVT prophylaxis: Heparin  Plan of care d/w patient    Pending: eval for STR, discharge planning

## 2023-10-24 NOTE — SWALLOW BEDSIDE ASSESSMENT ADULT - SLP PERTINENT HISTORY OF CURRENT PROBLEM
57 y/o man with PMH of multiple myeloma diagnosed in 2017 and not on any chemotherapy or radiation and CKD 3B. Presented with Left flank pain. He was found to have MAC on CKD and elevated troponins and proBNP. CT abdomen revealed Bilateral lower lobe atelectasis and trace left pleural effusion. Multiple lytic lesions of the spine and pelvis, consistent with know h/o multiple myeloma.
59 y/o man with PMH of multiple myeloma diagnosed in 2017 and not on any chemotherapy or radiation and CKD 3B. Presented with Left flank pain. He was found to have MAC on CKD and elevated troponins and proBNP. CT abdomen revealed Bilateral lower lobe atelectasis and trace left pleural effusion. Multiple lytic lesions of the spine and pelvis, consistent with know h/o multiple myeloma.

## 2023-10-24 NOTE — SWALLOW BEDSIDE ASSESSMENT ADULT - COMMENTS
Mild/mod oral dysphagia with easy to chew solids, + tolerance for soft & bite sized with thin liquids without overt s/s of penetration/ aspiration. Pt known to ST Pt known to ST October 19th 2023- w/ recommendations for soft & bite sized w/ thin liquids

## 2023-10-24 NOTE — SWALLOW BEDSIDE ASSESSMENT ADULT - SLP GENERAL OBSERVATIONS
Pt found laying in bed alert and awake w/ friends at bedside. Pt on 3L O2 via NC. Pt states he has difficulty chewing/swallowing.
Pt. received awake, alert, room air.

## 2023-10-25 NOTE — SWALLOW VFSS/MBS ASSESSMENT ADULT - ROSENBEK'S PENETRATION ASPIRATION SCALE
2/8 thin liquids; 1/8 mildly thick liquids, puree, soft and bite sized consistencies/(2) contrast enters airway, remains above the vocal cords, no residue remains (penetration) left upper arm

## 2023-10-25 NOTE — PROGRESS NOTE ADULT - ATTENDING COMMENTS
57 y/o man with PMH of multiple myeloma diagnosed in 2017 and not on any chemotherapy or radiation and CKD 3B presented with Left flank pain. He was found to have MAC on CKD and elevated troponins and proBNP.     # dysphagia to pills and solid food  speech therapy consulted- plan for MBS    # constipation- resolved  had bowel movements 10/22- states had 2 episodes of loose stools  hold laxatives, monitor    # CKD3B likely due to MM ( baseline 1.9-2.1)- at baseline  # Hypercalcemia resolved   # hypomagnesemia- replaced and improved    # Multiple myeloma stopped treatment >1.5 years  oncology evaluated  willing to restart Rx now  - spep with SONNY, upep with SONNY, Ig panel, FLC raio, beta 2 microglobulin     # acute on chronic- normocytic- possibly related to MM  # folate deficiency  nl b12  folate- low- on supplement  check iron studies, ferritin, RC    # Lower abdominal and low back spasms due to lytic lesion from MM and hypercalcemia   continue PRN pain medication  continue robaxin- currently TID  PT following    # Dyspnea on exertion- stable  echo EF 60% no ventricular or valve dysfunction.   trops mild stable elevation ( due to CKD-)ekg no ischemic event    # Severe Malnourished with dysphagia     # B/l buccal nodular swelling-  poor dental hygiene  s/p dental eval- recommended ENT eval- d/w patient for OP follow up                              # DVT prophylaxis: Heparin  Plan of care d/w patient    Pending: auth for STR, speech therapy
59 y/o man with PMH of multiple myeloma diagnosed in 2017 and not on any chemotherapy or radiation and CKD 3B presented with Left flank pain. He was found to have MAC on CKD and elevated troponins and proBNP.     s: patient unhappy that he had to wait in dental exam area; would like to talk to patient relations    # CKD3B likely due to MM ( baseline 1.9-2.1)- at baseline  monitor BMP    # Hypercalcemia resolved     # hypomagnesemia- replaced and monitor    # Multiple myeloma stopped treatment   oncology consulted- will await evaluation by Dr Yost    # acute on chronic- normocytic- posisbly related to MM  monitor b12 , folate    # Lower abdominal and low back spasms due to lytic lesion from MM and hypercalcemia   continue PRN pain medicaiton  continue robaxin- currently TID  PT consult    # Dyspnea on exertion- possible due to anemia   . echo EF 60% no ventricular or valve dysfunction.   trops nild stable elevation ( due to CKD-)ekg no ischemic event    # Severe Malnourished with dysphagia     # B/l cheek swelling- unsure whether related to dental irritation of buccal mucosa- but no mucosal erosion seen  poor dental hygiene  dental consulted    #Malnourished  nutrition consult  dietary supplement                               # DVT prophylaxis: Heparin  Plan of care d/w patient    Pendihg: PT follow up,    patient refusing with dishcarge planning until PT follow up and his complaints are sorted with patient relations  will prepare discharge for Monday and will have HI discuss with patient
59 y/o man with PMH of multiple myeloma diagnosed in 2017 and not on any chemotherapy or radiation and CKD 3B presented with Left flank pain. He was found to have MAC on CKD and elevated troponins and proBNP.     # dysphagia to pills and solid food  speech therapy consulted- plan for VFSS today    # CKD3B likely due to MM ( baseline 1.9-2.1)- at baseline  # Hypercalcemia resolved   # hypomagnesemia- replaced and improved    # Multiple myeloma stopped treatment >1.5 years  oncology evaluated  willing to restart Rx now  - will follow up with heme onc as outpatient    # acute on chronic- normocytic- possibly related to MM  # folate deficiency  nl b12  folate- low- on supplement    # Lower abdominal and low back spasms due to lytic lesion from MM and hypercalcemia   continue PRN pain medication  continue robaxin- currently TID  PT following    # Dyspnea on exertion- stable  echo EF 60% no ventricular or valve dysfunction.   trops mild stable elevation ( due to CKD-)ekg no ischemic event    # Severe Malnourished with dysphagia     # B/l buccal nodular swelling-  poor dental hygiene  s/p dental eval- recommended ENT eval- team d/w patient for OP follow up                              # DVT prophylaxis: Heparin  Plan of care d/w patient    Pending: auth for STR, VFSS today
59 y/o man with PMH of multiple myeloma diagnosed in 2017 and not on any chemotherapy or radiation and CKD 3B presented with Left flank pain. He was found to have MAC on CKD and elevated troponins and proBNP.     GENERAL: NAD, lying in bed comfortably  HEENT: B/l swelling of cheeks.   CHEST/LUNG: Clear to auscultation bilaterally; No rales, rhonchi, wheezing, or rubs. Unlabored respirations  HEART: Regular rate and rhythm; No murmurs, rubs, or gallops  ABDOMEN: Bowel sounds present; Soft, Nontender, Nondistended. No hepatomegally  EXTREMITIES:  2+ Peripheral Pulses, brisk capillary refill. No clubbing, cyanosis, or edema  NERVOUS SYSTEM:  Alert & Oriented X3, speech clear. No deficits     Impression     CKD3B likely due to MM ( baseline 1.9-2.1)  Hypercalcemia resolved ( corrected to albumin 10.4)  Multiple myeloma stopped treatment   acute on chronic Macrocytic anemia   Lower abdominal and low back spasms due to lytic lesion from MM and hypercalcemia   Dyspnea on exertion- possible due to anemia . echo EF 60% no ventricular or valve dysfunction. trops elevated ekg no ischemic event  Severe Malnourished with dysphagia   B/l cheek swelling-> due to front teeth scraping on his cheeks    Plan   continue IVF-> dc once eating better  check b12 and folate levels   start robaxin prn   start tylenol standing   continue oxy prn   SS- soft and bite sized   dental consult   PT eval   Heme onc consult - dr. montgomery       # DVT prophylaxis: Heparin  # GI prophylaxis: NI  # Diet: Soft/Bite Sized  # Activity: AAT  # Disposition: from home  # Handoff:   - f/u Heme onc (Dr. Celia Montgomery)  - f/u dental consult

## 2023-10-25 NOTE — PROGRESS NOTE ADULT - ASSESSMENT
59 y/o man with PMH of multiple myeloma diagnosed in 2017 and not on any chemotherapy or radiation and CKD 3B presented with Left flank pain. He was found to have MAC on CKD and elevated troponins and proBNP.     # constipation- resolved  had bowel movements yesterday- states had 2 episodes of loose stools  hold laxatives    # CKD3B likely due to MM ( baseline 1.9-2.1)- at baseline  # Hypercalcemia resolved   # hypomagnesemia- replaced and improved    # Multiple myeloma stopped treatment >1.5 years  oncology evaluated  willing to restart Rx now  - spep with SONNY, upep with SONNY, Ig panel, FLC raio, beta 2 microglobulin - collected  SPEP: One Gamma-Migrating Paraprotein and One Beta-Migrating Paraprotein     # acute on chronic- normocytic- possibly related to MM  # folate deficiency  nl b12  folate- low- on supplement  check iron studies, ferritin, RC    # Lower abdominal and low back spasms due to lytic lesion from MM and hypercalcemia   continue PRN pain medicaiton  continue robaxin- currently TID  PT following    # Dyspnea on exertion- stable  echo EF 60% no ventricular or valve dysfunction.   trops mild stable elevation ( due to CKD-)ekg no ischemic event    # Severe Malnourished with dysphagia   Swallowing assessment  Barium swallow ordered  F/U                                # DVT prophylaxis: Heparin  Plan of care d/w patient    Pending: eval for STR, discharge planning

## 2023-10-25 NOTE — PROGRESS NOTE ADULT - ASSESSMENT
59 y/o man with PMH of multiple myeloma diagnosed in 2017 and not on any chemotherapy or radiation (since May 2022) and CKD 3B presented with Left flank pain. He was found to have MAC on CKD and elevated troponins and proBNP.   last seen by DR Valadez >1 yr back   stopped Rx on his own     # CKD3B likely sec to MM  ( baseline 1.9-2.1).   # Hypercalcemia resolved     # hypomagnesemia- replace as indicated    # Multiple myeloma stopped treatment on his own >1.5 yr ago   willing to restart Rx now  very noncompliant   emphasized on compliance.  myeloma panel noted    # acute on chronic- normocytic anemia - likley multifactorial including  MM  stable hb     # Lower abdominal and low back spasms without any weakness of LE or bowel/bladder incontinence likely  due to lytic lesion from MM and hypercalcemia   imroved  pain meds as per pain Mn  CT abdo showed multiple lytic lesion in spine ,s/p kyphoplasty T11/L1,L4/5  PT   will get pet/ct as outpt to assess dz burden if pt follows up as outpt     # Severe Malnourished with dysphagia     # B/l cheek swelling- seen by dental     #Malnourished  nutrition eval  awaiting d/c to rehab     cont other supportive care     spoke with sister yesterday.   he will f/u with Dr Valadez as outpt for further Mn of Myeloma.  explained to pt   agrees to f/u as outpt

## 2023-10-25 NOTE — PROGRESS NOTE ADULT - ASSESSMENT
CKD stage 3-4 with proteinuria   Cr 1.9 (6/2021)  etiology likely due to MM  multiple bone lytic lesions with bony / musculoskeletal pain  mild hypercalcemia, improved   s/p kyphoplasty  anasarca on CT abd likely from 3rd spacing   anemia  poor dentition / malnutrition / hypoalbuminemia  EF 61% / nl echo    plan:    f/u Ca++, if worsens, may need pamidronate 90mg iv x 1 over 4 hours   keep orally hydrated   f/u oncology   avoid further NSAIDs  no ACE-I / ARB  physical therapy  possible STR

## 2023-10-25 NOTE — SWALLOW VFSS/MBS ASSESSMENT ADULT - ORAL PHASE
Residue in oral cavity/Uncontrolled bolus / spillover in aditya-pharynx/Uncontrolled bolus / spillover in hypopharynx

## 2023-10-25 NOTE — SWALLOW VFSS/MBS ASSESSMENT ADULT - SLP PERTINENT HISTORY OF CURRENT PROBLEM
Pt is a 57 y/o M w/ PMHx: multiple myeloma diagnosed in 2017 and not on any chemotherapy or radiation and CKD 3B p/w L-flank pain. Pt found to have MAC on CKD and elevated troponin. Pt w/ lower abdominal and low back spasms due to lytic lesion from MM and hypercalcemia. Pt w/ malnourishment- dysphagia to pills and solid food.

## 2023-10-26 NOTE — CHART NOTE - NSCHARTNOTEFT_GEN_A_CORE
Registered Dietitian Follow-Up     Patient Profile Reviewed                           Yes [x]   No []     Nutrition History Previously Obtained        Yes [x]  No []       Pertinent Subjective Information: Pt reports consuming <50% of meals. Pt states "I do not like the food served." Observed Ensure Plus protein shakes brought in from home -- pt prefers chocolate flavor. Pt drinks 100% of Ensure shakes daily. Pt willing to try MAGIC CUP -- chocolate flavor.      Pertinent Medical Information: Pt is a 57 y/o male with PMHx of multiple myeloma diagnosed in 2017 and not on any chemotherapy or radiation and CKD 3B presented with Left flank pain. He was found to have MAC on CKD and elevated troponins and proBNP.    Per SLP note on 10/25: minced+moist, thin liquids (pt w/ preference for very soft moist foods at this time)     Diet order: Diet, Minced and Moist (10-26-23 @ 08:58) [Active]    Anthropometrics:  Weight: 56.7 KG  Height (cm): 180.3 (10-19-23 @ 13:28)  IBW: 78.2 KG    Daily Weight in k.3 (10-24) -- no edema noted     MEDICATIONS  (STANDING):  acetaminophen     Tablet .. 975 milliGRAM(s) Oral every 6 hours  bisacodyl Suppository 10 milliGRAM(s) Rectal once  chlorhexidine 2% Cloths 1 Application(s) Topical daily  dextrose 5% + sodium chloride 0.9%. 1000 milliLiter(s) (75 mL/Hr) IV Continuous <Continuous>  folic acid 1 milliGRAM(s) Oral daily  heparin   Injectable 5000 Unit(s) SubCutaneous every 12 hours  methocarbamol 500 milliGRAM(s) Oral three times a day  zoledronic acid IVPB (ZOMETA) (Non - oncologic) 4 milliGRAM(s) IV Intermittent once    MEDICATIONS  (PRN):  oxyCODONE    IR 5 milliGRAM(s) Oral every 8 hours PRN Moderate Pain (4 - 6)    Pertinent Labs: 10-26 @ 08:59: Na 137, BUN 23<H>, Cr 2.4<H>, <H>, K+ 4.8, Phos --, Mg 2.1, Alk Phos 59, ALT/SGPT 40, AST/SGOT 40, HbA1c --    Finger Sticks:  POCT Blood Glucose.: 112 mg/dL (10-25 @ 23:59)    Physical Findings:  - Appearance: alert  - GI function: last BM on 10/22 per pt  - Tubes: no feeing tubes  - Oral/Mouth cavity: tolerating current diet texture/consistency  - Skin: intact; no pressure injuries noted  - Edema: no edema noted     Nutrition Requirements:  Weight Used:     Estimated Energy Needs    Continue []  Adjust []  Adjusted Energy Recommendations:   kcal/day        Estimated Protein Needs    Continue []  Adjust []  Adjusted Protein Recommendations:   gm/day        Estimated Fluid Needs        Continue []  Adjust []  Adjusted Fluid Recommendations:   mL/day     Nutrient Intake:    [] Previous Nutrition Diagnosis:            [] Ongoing          [] Resolved    [] No active nutrition diagnosis identified at this time     Nutrition Education:      Goal/Expected Outcome:      Indicator/Monitoring:     Recommendation: Registered Dietitian Follow-Up     Patient Profile Reviewed                           Yes [x]   No []     Nutrition History Previously Obtained        Yes [x]  No []       Pertinent Subjective Information: Pt reports consuming <50% of meals. Pt states "I do not like the food served." Observed Ensure Plus protein shakes brought in from home -- pt prefers chocolate flavor. Pt drinks 100% of Ensure shakes daily. Pt willing to try MAGIC CUP -- chocolate flavor.      Pertinent Medical Information: Pt is a 59 y/o male with PMHx of multiple myeloma diagnosed in 2017 and not on any chemotherapy or radiation and CKD 3B presented with Left flank pain. He was found to have MAC on CKD and elevated troponins and proBNP.    Per MD note on 10/25:   # B/l buccal nodular swelling-  poor dental hygiene  s/p dental eval- recommended ENT eval- team d/w patient for OP follow up  # Multiple myeloma stopped treatment >1.5 years  oncology evaluated  # Hypercalcemia resolved   # hypomagnesemia- replaced and improved    Per SLP note on 10/25: minced+moist, thin liquids (pt w/ preference for very soft moist foods at this time)     Diet order: Diet, Minced and Moist (10-26-23 @ 08:58) [Active]    Anthropometrics:  Weight: 56.7 KG  Height (cm): 180.3 (10-19-23 @ 13:28)  IBW: 78.2 KG    Daily Weight in k.3 (10-24) -- no edema noted     MEDICATIONS  (STANDING):  acetaminophen     Tablet .. 975 milliGRAM(s) Oral every 6 hours  bisacodyl Suppository 10 milliGRAM(s) Rectal once  chlorhexidine 2% Cloths 1 Application(s) Topical daily  dextrose 5% + sodium chloride 0.9%. 1000 milliLiter(s) (75 mL/Hr) IV Continuous <Continuous>  folic acid 1 milliGRAM(s) Oral daily  heparin   Injectable 5000 Unit(s) SubCutaneous every 12 hours  methocarbamol 500 milliGRAM(s) Oral three times a day  zoledronic acid IVPB (ZOMETA) (Non - oncologic) 4 milliGRAM(s) IV Intermittent once    MEDICATIONS  (PRN):  oxyCODONE    IR 5 milliGRAM(s) Oral every 8 hours PRN Moderate Pain (4 - 6)    Pertinent Labs: 10-26 @ 08:59: Na 137, BUN 23<H>, Cr 2.4<H>, <H>, K+ 4.8, Phos --, Mg 2.1, Alk Phos 59, ALT/SGPT 40, AST/SGOT 40, HbA1c --    Finger Sticks:  POCT Blood Glucose.: 112 mg/dL (10- @ 23:59)    Physical Findings:  - Appearance: alert  - GI function: last BM on 10/22 per pt  - Tubes: no feeding tubes  - Oral/Mouth cavity: tolerating current diet texture/consistency  - Skin: intact; no pressure injuries noted  - Edema: no edema noted     Nutrition Requirements:  Weight Used: Using ABW -- with consideration for age, BMI, multiple myeloma, malnutrition    Estimated Energy Needs    Continue [x]  Adjust []  ENERGY: 6235-3653 g/day (30-35 kcal/kg)     Estimated Protein Needs    Continue [x]  Adjust []  PROTEIN: 68-85 g/day (1.2-1.5 g/kg)     Estimated Fluid Needs        Continue [x]  Adjust []  FLUID: 1mL/kcal    [x] Previous Nutrition Diagnosis: Severe Malnutrition            [x] Ongoing          [] Resolved    [] No active nutrition diagnosis identified at this time     Nutrition Education: Discussed importance of PO intake, current diet order per SLP recs and medical nutrition supplements to optimize kcal/pro intake      Goal/Expected Outcome: Pt to meet at least 50-75% of estimated needs within 3-5 days     Indicator/Monitoring: Diet order, PO intake, weights, labs, NFPF, body composition, BM and tolerance to medical food supplements    Recommendation:  1. Pt has Ensure Plus brought in from home; ADD MAGIC CUP 3X/DAILY to optimize kcal/pro intake -- provides 870 kcal, 27g protein total  2. Continue with current diet order per SLP recs  3. Encourage PO intake and assist during meals prn    Pt is at high nutrition risk; will f/u in 3-5 days or prn.    RD to remain available: Sagrario Bryant [phone] or LESLEY

## 2023-10-26 NOTE — PROGRESS NOTE ADULT - ASSESSMENT
CKD stage 3-4 with proteinuria   Cr 1.9 (6/2021)  etiology likely due to MM  multiple bone lytic lesions with bony / musculoskeletal pain  hypercalcemia, worse   s/p kyphoplasty  anasarca on CT abd likely from 3rd spacing   anemia  poor dentition / malnutrition / hypoalbuminemia  EF 61% / nl echo    plan:    no pamidronate in pharmacy, thus can give xgeva 120mg iv x 1   NS IVF  f/u oncology for MM specific rx  avoid further NSAIDs  physical therapy  f/u labs  d/w team

## 2023-10-26 NOTE — PROGRESS NOTE ADULT - NUTRITIONAL ASSESSMENT
This patient has been assessed with a concern for Malnutrition and has been determined to have a diagnosis/diagnoses of Severe protein-calorie malnutrition and Underweight (BMI < 19).    This patient is being managed with:   Diet Soft and Bite Sized-  Free Water Flush Instructions:  chocolate ensure plus high protein; magic cup for dinner  Supplement Feeding Modality:  Oral  Ensure Plus High Protein Cans or Servings Per Day:  3       Frequency:  Daily  Entered: Oct 19 2023  1:27PM  
This patient has been assessed with a concern for Malnutrition and has been determined to have a diagnosis/diagnoses of Severe protein-calorie malnutrition and Underweight (BMI < 19).    This patient is being managed with:   Diet Soft and Bite Sized-  Free Water Flush Instructions:  chocolate ensure plus high protein; magic cup for dinner  Supplement Feeding Modality:  Oral  Ensure Plus High Protein Cans or Servings Per Day:  3       Frequency:  Daily  Entered: Oct 19 2023  1:27PM    This patient has been assessed with a concern for Malnutrition and has been determined to have a diagnosis/diagnoses of Severe protein-calorie malnutrition and Underweight (BMI < 19).    This patient is being managed with:   Diet Soft and Bite Sized-  Free Water Flush Instructions:  chocolate ensure plus high protein; magic cup for dinner  Supplement Feeding Modality:  Oral  Ensure Plus High Protein Cans or Servings Per Day:  3       Frequency:  Daily  Entered: Oct 19 2023  1:27PM  
This patient has been assessed with a concern for Malnutrition and has been determined to have a diagnosis/diagnoses of Severe protein-calorie malnutrition and Underweight (BMI < 19).    This patient is being managed with:   Diet Minced and Moist-  Mildly Thick Liquids (MILDTHICKLIQS)  Entered: Oct 24 2023  2:41PM  
This patient has been assessed with a concern for Malnutrition and has been determined to have a diagnosis/diagnoses of Severe protein-calorie malnutrition and Underweight (BMI < 19).    This patient is being managed with:   Diet Soft and Bite Sized-  Free Water Flush Instructions:  chocolate ensure plus high protein; magic cup for dinner  Supplement Feeding Modality:  Oral  Ensure Plus High Protein Cans or Servings Per Day:  3       Frequency:  Daily  Entered: Oct 19 2023  1:27PM  
This patient has been assessed with a concern for Malnutrition and has been determined to have a diagnosis/diagnoses of Severe protein-calorie malnutrition and Underweight (BMI < 19).    This patient is being managed with:   Diet Minced and Moist-  Mildly Thick Liquids (MILDTHICKLIQS)  Entered: Oct 24 2023  2:41PM  
This patient has been assessed with a concern for Malnutrition and has been determined to have a diagnosis/diagnoses of Severe protein-calorie malnutrition and Underweight (BMI < 19).    This patient is being managed with:   Diet Minced and Moist-  Free Water Flush Instructions:  MAGIC CUP 3X/DAILY -- CHOCOLATE  Entered: Oct 26 2023  1:25PM  
This patient has been assessed with a concern for Malnutrition and has been determined to have a diagnosis/diagnoses of Severe protein-calorie malnutrition and Underweight (BMI < 19).    This patient is being managed with:   Diet Soft and Bite Sized-  Free Water Flush Instructions:  chocolate ensure plus high protein; magic cup for dinner  Supplement Feeding Modality:  Oral  Ensure Plus High Protein Cans or Servings Per Day:  3       Frequency:  Daily  Entered: Oct 19 2023  1:27PM  
This patient has been assessed with a concern for Malnutrition and has been determined to have a diagnosis/diagnoses of Severe protein-calorie malnutrition and Underweight (BMI < 19).    This patient is being managed with:   Diet Soft and Bite Sized-  Free Water Flush Instructions:  chocolate ensure plus high protein; magic cup for dinner  Supplement Feeding Modality:  Oral  Ensure Plus High Protein Cans or Servings Per Day:  3       Frequency:  Daily  Entered: Oct 19 2023  1:27PM

## 2023-10-26 NOTE — PROGRESS NOTE ADULT - SUBJECTIVE AND OBJECTIVE BOX
24H events:    Patient is a 58y old Male who presents with a chief complaint of Left flank pain (23 Oct 2023 12:42)    Primary diagnosis of Abdominal pain.    Today is 7d of hospitalization. This morning patient was seen and examined at bedside, resting comfortably in bed.    No acute or major events overnight.    Code Status: Full    Family communication:  Contact date:  Name of person contacted:  Relationship to patient:  Communication details:  What matters most:    PAST MEDICAL & SURGICAL HISTORY  Multiple myeloma, remission status unspecified    No significant past surgical history      ALLERGIES:  No Known Allergies    MEDICATIONS:  STANDING MEDICATIONS  acetaminophen     Tablet .. 975 milliGRAM(s) Oral every 6 hours  bisacodyl Suppository 10 milliGRAM(s) Rectal once  chlorhexidine 2% Cloths 1 Application(s) Topical daily  folic acid 1 milliGRAM(s) Oral daily  heparin   Injectable 5000 Unit(s) SubCutaneous every 12 hours  methocarbamol 500 milliGRAM(s) Oral three times a day    PRN MEDICATIONS  oxyCODONE    IR 5 milliGRAM(s) Oral every 8 hours PRN    VITALS:   T(F): 98  HR: 91  BP: 132/64  RR: 18  SpO2: 96%    PHYSICAL EXAM:  GENERAL:   ( x) NAD, lying in bed comfortably     (  ) obtunded     (  ) lethargic     (  ) somnolent    HEAD:   ( x) Atraumatic     (  ) hematoma     (  ) laceration (specify location:       )     NECK:  (x) Supple     (  ) neck stiffness     (  ) nuchal rigidity     (  )  no JVD     (  ) JVD present ( -- cm)    HEART:  Rate -->     (x) normal rate     (  ) bradycardic     (  ) tachycardic  Rhythm -->     (x) regular     (  ) regularly irregular     (  ) irregularly irregular  Murmurs -->     (x) normal s1s2     (  ) systolic murmur     (  ) diastolic murmur     (  ) continuous murmur      (  ) S3 present     (  ) S4 present    LUNGS:   ( x)Unlabored respirations     (  ) tachypnea  ( x) B/L air entry     (  ) decreased breath sounds in:  (location     )    ( x) no adventitious sound     (  ) crackles     (  ) wheezing      (  ) rhonchi      (specify location:       )  (  ) chest wall tenderness (specify location:       )    ABDOMEN:   ( x) Soft     (  ) tense   |   (  ) nondistended     (  ) distended   |   (  ) +BS     (  ) hypoactive bowel sounds     (  ) hyperactive bowel sounds  ( x) nontender     (  ) RUQ tenderness     (  ) RLQ tenderness     (  ) LLQ tenderness     (  ) epigastric tenderness     (  ) diffuse tenderness  (  ) Splenomegaly      (  ) Hepatomegaly      (  ) Jaundice     (  ) ecchymosis     EXTREMITIES:  ( x) Normal     (  ) Rash     (  ) ecchymosis     (  ) varicose veins      (  ) pitting edema     (  ) non-pitting edema   (  ) ulceration     (  ) gangrene:     (location:     )    NERVOUS SYSTEM:    ( x) A&Ox3     (  ) confused     (  ) lethargic  CN II-XII:     ( x) Intact     (  ) deficits found     (Specify:     )        LABS:                        10.5   10.78 )-----------( 284      ( 23 Oct 2023 07:43 )             31.6     10-23    139  |  101  |  20  ----------------------------<  75  4.6   |  25  |  2.1<H>    Ca    10.4      23 Oct 2023 07:43  Phos  3.6     10-23  Mg     1.9     10-23    TPro  6.8  /  Alb  3.6  /  TBili  0.3  /  DBili  x   /  AST  56<H>  /  ALT  36  /  AlkPhos  57  10-23      Urinalysis Basic - ( 23 Oct 2023 07:43 )    Color: x / Appearance: x / SG: x / pH: x  Gluc: 75 mg/dL / Ketone: x  / Bili: x / Urobili: x   Blood: x / Protein: x / Nitrite: x   Leuk Esterase: x / RBC: x / WBC x   Sq Epi: x / Non Sq Epi: x / Bacteria: x  
24H events:    Patient is a 58y old Male who presents with a chief complaint of Left flank pain (24 Oct 2023 10:59)    Primary diagnosis of Abdominal pain.    Today is 8d of hospitalization. This morning patient was seen and examined at bedside, resting comfortably in bed.    No acute or major events overnight.    Code Status: Full     Family communication:  Contact date:  Name of person contacted:  Relationship to patient:  Communication details:  What matters most:    PAST MEDICAL & SURGICAL HISTORY  Multiple myeloma, remission status unspecified    No significant past surgical history      ALLERGIES:  No Known Allergies    MEDICATIONS:  STANDING MEDICATIONS  acetaminophen     Tablet .. 975 milliGRAM(s) Oral every 6 hours  bisacodyl Suppository 10 milliGRAM(s) Rectal once  chlorhexidine 2% Cloths 1 Application(s) Topical daily  folic acid 1 milliGRAM(s) Oral daily  heparin   Injectable 5000 Unit(s) SubCutaneous every 12 hours  methocarbamol 500 milliGRAM(s) Oral three times a day    PRN MEDICATIONS  oxyCODONE    IR 5 milliGRAM(s) Oral every 8 hours PRN    VITALS:   T(F): 97.2  HR: 93  BP: 125/68  RR: 17  SpO2: --    PHYSICAL EXAM:  GENERAL:   ( x) NAD, lying in bed comfortably     (  ) obtunded     (  ) lethargic     (  ) somnolent    HEAD:   ( x) Atraumatic     (  ) hematoma     (  ) laceration (specify location:       )     NECK:  (x) Supple     (  ) neck stiffness     (  ) nuchal rigidity     (  )  no JVD     (  ) JVD present ( -- cm)    HEART:  Rate -->     (x) normal rate     (  ) bradycardic     (  ) tachycardic  Rhythm -->     (x) regular     (  ) regularly irregular     (  ) irregularly irregular  Murmurs -->     (x) normal s1s2     (  ) systolic murmur     (  ) diastolic murmur     (  ) continuous murmur      (  ) S3 present     (  ) S4 present    LUNGS:   ( x)Unlabored respirations     (  ) tachypnea  ( x) B/L air entry     (  ) decreased breath sounds in:  (location     )    ( x) no adventitious sound     (  ) crackles     (  ) wheezing      (  ) rhonchi      (specify location:       )  (  ) chest wall tenderness (specify location:       )    ABDOMEN:   ( x) Soft     (  ) tense   |   (  ) nondistended     (  ) distended   |   (  ) +BS     (  ) hypoactive bowel sounds     (  ) hyperactive bowel sounds  ( x) nontender     (  ) RUQ tenderness     (  ) RLQ tenderness     (  ) LLQ tenderness     (  ) epigastric tenderness     (  ) diffuse tenderness  (  ) Splenomegaly      (  ) Hepatomegaly      (  ) Jaundice     (  ) ecchymosis     EXTREMITIES:  ( x) Normal     (  ) Rash     (  ) ecchymosis     (  ) varicose veins      (  ) pitting edema     (  ) non-pitting edema   (  ) ulceration     (  ) gangrene:     (location:     )    NERVOUS SYSTEM:    ( x) A&Ox3     (  ) confused     (  ) lethargic  CN II-XII:     ( x) Intact     (  ) deficits found     (Specify:     )       LABS:                        10.4   10.31 )-----------( 294      ( 24 Oct 2023 08:43 )             31.2     10-24    137  |  100  |  21<H>  ----------------------------<  83  4.5   |  28  |  2.0<H>    Ca    11.0<H>      24 Oct 2023 08:43  Phos  3.6     10-23  Mg     1.9     10-24    TPro  6.8  /  Alb  3.6  /  TBili  0.3  /  DBili  x   /  AST  56<H>  /  ALT  36  /  AlkPhos  57  10-23      Urinalysis Basic - ( 24 Oct 2023 08:43 )    Color: x / Appearance: x / SG: x / pH: x  Gluc: 83 mg/dL / Ketone: x  / Bili: x / Urobili: x   Blood: x / Protein: x / Nitrite: x   Leuk Esterase: x / RBC: x / WBC x   Sq Epi: x / Non Sq Epi: x / Bacteria: x                
24H events:    Patient is a 58y old Male who presents with a chief complaint of Left flank pain (25 Oct 2023 09:09)    Primary diagnosis of Abdominal pain    Today is 9d of hospitalization. This morning patient was seen and examined at bedside, resting comfortably in bed.    No acute or major events overnight.  Barium study today.      Code Status: Full     Family communication:  Contact date:  Name of person contacted:  Relationship to patient:  Communication details:  What matters most:    PAST MEDICAL & SURGICAL HISTORY  Multiple myeloma, remission status unspecified    No significant past surgical history        ALLERGIES:  No Known Allergies    MEDICATIONS:  STANDING MEDICATIONS  acetaminophen     Tablet .. 975 milliGRAM(s) Oral every 6 hours  bisacodyl Suppository 10 milliGRAM(s) Rectal once  chlorhexidine 2% Cloths 1 Application(s) Topical daily  folic acid 1 milliGRAM(s) Oral daily  heparin   Injectable 5000 Unit(s) SubCutaneous every 12 hours  methocarbamol 500 milliGRAM(s) Oral three times a day    PRN MEDICATIONS    VITALS:   T(F): 97.1  HR: 90  BP: 113/59  RR: 20  SpO2: --    PHYSICAL EXAM:  GENERAL:   ( x) NAD, lying in bed comfortably     (  ) obtunded     (  ) lethargic     (  ) somnolent    HEAD:   ( x) Atraumatic     (  ) hematoma     (  ) laceration (specify location:       )     NECK:  (x) Supple     (  ) neck stiffness     (  ) nuchal rigidity     (  )  no JVD     (  ) JVD present ( -- cm)    HEART:  Rate -->     (x) normal rate     (  ) bradycardic     (  ) tachycardic  Rhythm -->     (x) regular     (  ) regularly irregular     (  ) irregularly irregular  Murmurs -->     (x) normal s1s2     (  ) systolic murmur     (  ) diastolic murmur     (  ) continuous murmur      (  ) S3 present     (  ) S4 present    LUNGS:   ( x)Unlabored respirations     (  ) tachypnea  ( x) B/L air entry     (  ) decreased breath sounds in:  (location     )    ( x) no adventitious sound     (  ) crackles     (  ) wheezing      (  ) rhonchi      (specify location:       )  (  ) chest wall tenderness (specify location:       )    ABDOMEN:   ( x) Soft     (  ) tense   |   (  ) nondistended     (  ) distended   |   (  ) +BS     (  ) hypoactive bowel sounds     (  ) hyperactive bowel sounds  ( x) nontender     (  ) RUQ tenderness     (  ) RLQ tenderness     (  ) LLQ tenderness     (  ) epigastric tenderness     (  ) diffuse tenderness  (  ) Splenomegaly      (  ) Hepatomegaly      (  ) Jaundice     (  ) ecchymosis     EXTREMITIES:  ( x) Normal     (  ) Rash     (  ) ecchymosis     (  ) varicose veins      (  ) pitting edema     (  ) non-pitting edema   (  ) ulceration     (  ) gangrene:     (location:     )    NERVOUS SYSTEM:    ( x) A&Ox3     (  ) confused     (  ) lethargic  CN II-XII:     ( x) Intact     (  ) deficits found     (Specify:     )       LABS:                        9.9    10.21 )-----------( 270      ( 25 Oct 2023 08:44 )             30.5     10-25    134<L>  |  98  |  22<H>  ----------------------------<  85  4.4   |  27  |  2.2<H>    Ca    11.0<H>      25 Oct 2023 08:44  Mg     1.9     10-24      Urinalysis Basic - ( 25 Oct 2023 08:44 )    Color: x / Appearance: x / SG: x / pH: x  Gluc: 85 mg/dL / Ketone: x  / Bili: x / Urobili: x   Blood: x / Protein: x / Nitrite: x   Leuk Esterase: x / RBC: x / WBC x   Sq Epi: x / Non Sq Epi: x / Bacteria: x      
NEPHROLOGY FOLLOW UP NOTE    pt seen and examined  no new events      PAST MEDICAL & SURGICAL HISTORY:  Multiple myeloma, remission status unspecified      No significant past surgical history        Allergies:  No Known Allergies    Home Medications Reviewed    SOCIAL HISTORY:  Denies ETOH,Smoking,   FAMILY HISTORY:  No pertinent family history in first degree relatives          REVIEW OF SYSTEMS:  as above  All other review of systems is negative unless indicated above.    PHYSICAL EXAM:  Constitutional: frail  HEENT: poor dentition  Neck: No JVD  Respiratory: CTAB, no wheezes, rales or rhonchi  Cardiovascular: S1, S2, RRR  Gastrointestinal: BS+, soft, NT/ND  Extremities: No cyanosis or clubbing. No peripheral edema  Neurological: A/O x 3, no focal deficits  Psychiatric: Normal mood, normal affect  : No CVA tenderness. No mcdonnell.   Skin: No rashes    Hospital Medications:   MEDICATIONS  (STANDING):  acetaminophen     Tablet .. 975 milliGRAM(s) Oral every 6 hours  chlorhexidine 2% Cloths 1 Application(s) Topical daily  heparin   Injectable 5000 Unit(s) SubCutaneous every 12 hours  methocarbamol 500 milliGRAM(s) Oral three times a day        VITALS:  T(F): 97.6 (10-20-23 @ 05:18), Max: 98.1 (10-19-23 @ 14:05)  HR: 67 (10-20-23 @ 05:18)  BP: 129/70 (10-20-23 @ 05:18)  RR: 18 (10-20-23 @ 05:18)  SpO2: --  Wt(kg): --    10-18 @ 07:01  -  10-19 @ 07:00  --------------------------------------------------------  IN: 70 mL / OUT: 600 mL / NET: -530 mL    10-19 @ 07:01  -  10-20 @ 07:00  --------------------------------------------------------  IN: 1280 mL / OUT: 1001 mL / NET: 279 mL          LABS:  10-20    138  |  104  |  23<H>  ----------------------------<  74  4.4   |  25  |  2.1<H>    Ca    9.6      20 Oct 2023 06:55  Phos  3.2     10-20  Mg     1.7     10-20    TPro  6.2  /  Alb  3.4<L>  /  TBili  0.3  /  DBili      /  AST  18  /  ALT  11  /  AlkPhos  57  10-20                          9.4    7.09  )-----------( 256      ( 20 Oct 2023 06:55 )             28.7       Urine Studies:  Urinalysis Basic - ( 20 Oct 2023 06:55 )    Color:  / Appearance:  / SG:  / pH:   Gluc: 74 mg/dL / Ketone:   / Bili:  / Urobili:    Blood:  / Protein:  / Nitrite:    Leuk Esterase:  / RBC:  / WBC    Sq Epi:  / Non Sq Epi:  / Bacteria:       Creatinine, Random Urine: 83 mg/dL (10-18 @ 17:07)  Protein/Creatinine Ratio Calculation: 2.6 Ratio (10-18 @ 17:07)      RADIOLOGY & ADDITIONAL STUDIES:  
Patient is a 58y old  Male who presents with a chief complaint of Left flank pain (24 Oct 2023 14:41)       Pt is seen and examined  pt is awake and lying in bed  pt seems comfortable         ROS:  Negative except for: left flank pain     MEDICATIONS  (STANDING):  acetaminophen     Tablet .. 975 milliGRAM(s) Oral every 6 hours  bisacodyl Suppository 10 milliGRAM(s) Rectal once  chlorhexidine 2% Cloths 1 Application(s) Topical daily  folic acid 1 milliGRAM(s) Oral daily  heparin   Injectable 5000 Unit(s) SubCutaneous every 12 hours  methocarbamol 500 milliGRAM(s) Oral three times a day    MEDICATIONS  (PRN):      Allergies    No Known Allergies    Intolerances        Vital Signs Last 24 Hrs  T(C): 36.1 (25 Oct 2023 04:14), Max: 36.5 (24 Oct 2023 21:20)  T(F): 96.9 (25 Oct 2023 04:14), Max: 97.7 (24 Oct 2023 21:20)  HR: 77 (25 Oct 2023 04:14) (77 - 93)  BP: 110/62 (25 Oct 2023 04:14) (110/62 - 132/66)  BP(mean): --  RR: 18 (25 Oct 2023 04:14) (17 - 18)  SpO2: --        PHYSICAL EXAM  General: adult in NAD  HEENT: clear oropharynx, anicteric sclera, pink conjunctiva  Neck: supple  CV: normal S1/S2 with no murmur rubs or gallops  Lungs: positive air movement b/l ant lungs,clear to auscultation, no wheezes, no rales  Abdomen: soft non-tender non-distended, no hepatosplenomegaly  Ext: no clubbing cyanosis or edema  Skin: no rashes and no petechiae  Neuro: alert and oriented X 4, no focal deficits  LABS:                          10.4   10.31 )-----------( 294      ( 24 Oct 2023 08:43 )             31.2         Mean Cell Volume : 94.8 fL  Mean Cell Hemoglobin : 31.6 pg  Mean Cell Hemoglobin Concentration : 33.3 g/dL  Auto Neutrophil # : x  Auto Lymphocyte # : x  Auto Monocyte # : x  Auto Eosinophil # : x  Auto Basophil # : x  Auto Neutrophil % : x  Auto Lymphocyte % : x  Auto Monocyte % : x  Auto Eosinophil % : x  Auto Basophil % : x    Serial CBC's  10-24 @ 08:43  Hct-31.2 / Hgb-10.4 / Plat-294 / RBC-3.29 / WBC-10.31          Serial CBC's  10-23 @ 13:00  Hct--- / Hgb--- / Plat--- / RBC-3.26 / WBC---          Serial CBC's  10-23 @ 07:43  Hct-31.6 / Hgb-10.5 / Plat-284 / RBC-3.30 / WBC-10.78          Serial CBC's  10-22 @ 07:58  Hct-30.6 / Hgb-10.3 / Plat-274 / RBC-3.20 / WBC-9.41            10-24    137  |  100  |  21<H>  ----------------------------<  83  4.5   |  28  |  2.0<H>    Ca    11.0<H>      24 Oct 2023 08:43  Mg     1.9     10-24            WBC Count: 10.31 K/uL (10-24-23 @ 08:43)  Hemoglobin: 10.4 g/dL (10-24-23 @ 08:43)  Hematocrit: 31.2 % (10-24-23 @ 08:43)  Platelet Count - Automated: 294 K/uL (10-24-23 @ 08:43)  Ferritin: 528 ng/mL (10-23-23 @ 13:00)  Iron - Total Binding Capacity.: 143 ug/dL (10-23-23 @ 13:00)  Reticulocyte Percent: 1.2 % (10-23-23 @ 13:00)  WBC Count: 10.78 K/uL (10-23-23 @ 07:43)  Hemoglobin: 10.5 g/dL (10-23-23 @ 07:43)  Hematocrit: 31.6 % (10-23-23 @ 07:43)  Platelet Count - Automated: 284 K/uL (10-23-23 @ 07:43)  WBC Count: 9.41 K/uL (10-22-23 @ 07:58)  Hemoglobin: 10.3 g/dL (10-22-23 @ 07:58)  Hematocrit: 30.6 % (10-22-23 @ 07:58)  Platelet Count - Automated: 274 K/uL (10-22-23 @ 07:58)  WBC Count: 8.01 K/uL (10-21-23 @ 06:50)  Hemoglobin: 9.7 g/dL (10-21-23 @ 06:50)  Hematocrit: 28.9 % (10-21-23 @ 06:50)  Platelet Count - Automated: 270 K/uL (10-21-23 @ 06:50)  Vitamin B12, Serum: 1058 pg/mL (10-20-23 @ 06:55)  Folate, Serum: 3.3 ng/mL (10-20-23 @ 06:55)  WBC Count: 7.09 K/uL (10-20-23 @ 06:55)  Hemoglobin: 9.4 g/dL (10-20-23 @ 06:55)  Hematocrit: 28.7 % (10-20-23 @ 06:55)  Platelet Count - Automated: 256 K/uL (10-20-23 @ 06:55)  WBC Count: 8.43 K/uL (10-19-23 @ 21:42)  Hemoglobin: 9.0 g/dL (10-19-23 @ 21:42)  Hematocrit: 26.9 % (10-19-23 @ 21:42)  Platelet Count - Automated: 248 K/uL (10-19-23 @ 21:42)  WBC Count: 8.06 K/uL (10-19-23 @ 07:10)  Hemoglobin: 9.2 g/dL (10-19-23 @ 07:10)  Hematocrit: 27.2 % (10-19-23 @ 07:10)  Platelet Count - Automated: 237 K/uL (10-19-23 @ 07:10)  Platelet Count - Automated: 230 K/uL (10-18-23 @ 06:51)  WBC Count: 9.34 K/uL (10-18-23 @ 06:51)  Hemoglobin: 9.1 g/dL (10-18-23 @ 06:51)  Hematocrit: 27.8 % (10-18-23 @ 06:51)  WBC Count: 10.86 K/uL (10-17-23 @ 07:20)  Hemoglobin: 10.2 g/dL (10-17-23 @ 07:20)  Hematocrit: 31.2 % (10-17-23 @ 07:20)  Platelet Count - Automated: 242 K/uL (10-17-23 @ 07:20)  WBC Count: 11.97 K/uL (10-16-23 @ 15:43)  Hemoglobin: 10.6 g/dL (10-16-23 @ 15:43)  Hematocrit: 31.6 % (10-16-23 @ 15:43)  Platelet Count - Automated: 266 K/uL (10-16-23 @ 15:43)      Immunofixation, Serum:   One IgG Lambda Band and One Lambda Light Chain Identified (No IgD or IgE  Bands Identified)      Reference Range: None Detected (10-21 @ 10:52)  Serum Protein Electrophoresis Interp: One Gamma-Migrating Paraprotein and One Beta-Migrating Paraprotein  Identified (10-20 @ 15:45)  Quantitative Ig mg/dL (10-20 @ 15:45)  Quantitative IgA: 9 mg/dL (10-20 @ 15:45)  Quantitative IgM: 15 mg/dL (10-20 @ 15:45)  SONNY Kappa: 0.42 mg/dL (10-20 @ 15:45)  SONNY Lambda: 766.66 mg/dL (10-20 @ 15:45)  Serum Protein Electrophoresis Interp: One Gamma-Migrating Paraprotein and One Beta-Migrating Paraprotein  Identified (10-19 @ 07:10)  SONNY Kappa: 0.36 mg/dL (10-19 @ 07:10)  SONNY Lambda: 779.62 mg/dL (10-19 @ 07:10)  Immunofixation, Serum:   One IgG Lambda Band and One Lambda Light Chain Identified (No IgD or IgE  Bands Identified)      Reference Range: None Detected (10-19 @ 07:10)            BLOOD SMEAR INTERPRETATION:       RADIOLOGY & ADDITIONAL STUDIES:    
  NAN GONZALEZ  58y Male    INTERVAL HPI/OVERNIGHT EVENTS:    pt is hungry today - breakfast was served after I spoke to him  some nausea but it may be from hunger  + SOB on exertion  diarrhea now resolved  + lower abdominal and back spasms  denies trauma or fall  no fever    T(F): 97.4 (10-17-23 @ 07:33), Max: 98.2 (10-16-23 @ 14:03)  HR: 70 (10-17-23 @ 07:33) (70 - 100)  BP: 124/61 (10-17-23 @ 07:33) (124/61 - 157/69)  RR: 18 (10-17-23 @ 07:33) (16 - 18)  SpO2: 99% (10-17-23 @ 07:33) (98% - 99%) on RA    Daily Height in cm: 180.34 (16 Oct 2023 14:03)    POCT Blood Glucose.: 99 mg/dL (16 Oct 2023 14:01)    PHYSICAL EXAM:  GENERAL: NAD  HEAD:  Normocephalic  EYES:  conjunctiva and sclera clear  ENMT: Moist mucous membranes  NECK: Supple  NERVOUS SYSTEM:  Alert, awake, Good concentration  CHEST/LUNG: CTA b/l  HEART: Regular rate and rhythm  ABDOMEN: Soft, Nontender, Nondistended; Bowel sounds present  EXTREMITIES: No edema  SKIN: pale, warm  back - no tenderness to palpation    Consultant(s) Notes Reviewed:  [x ] YES  [ ] NO  Care Discussed with Consultants/Other Providers [ x] YES  [ ] NO    MEDICATIONS  (STANDING):  heparin   Injectable 5000 Unit(s) SubCutaneous every 12 hours  sodium chloride 0.9%. 1000 milliLiter(s) (70 mL/Hr) IV Continuous <Continuous>    MEDICATIONS  (PRN):  acetaminophen     Tablet .. 650 milliGRAM(s) Oral every 6 hours PRN Mild Pain (1 - 3)  methocarbamol 500 milliGRAM(s) Oral every 8 hours PRN Muscle Spasm  oxyCODONE    IR 5 milliGRAM(s) Oral every 8 hours PRN Moderate Pain (4 - 6)      Telemetry reviewed by me    LABS:                        10.2   10.86 )-----------( 242      ( 17 Oct 2023 07:20 )             31.2     10-17    135  |  101  |  27<H>  ----------------------------<  51<LL>  4.6   |  23  |  2.1<H>    Ca    10.0      17 Oct 2023 07:20  Mg     1.8     10-17    TPro  6.0  /  Alb  3.3<L>  /  TBili  0.3  /  DBili  x   /  AST  19  /  ALT  12  /  AlkPhos  60  10-17      CARDIAC MARKERS ( 17 Oct 2023 07:20 )  x     / 0.07 ng/mL / x     / x     / x      CARDIAC MARKERS ( 16 Oct 2023 15:43 )  x     / 0.06 ng/mL / x     / x     / x              RADIOLOGY & ADDITIONAL TESTS:    Imaging or report Personally Reviewed:  [x ] YES  [ ] NO    < from: CT Abdomen and Pelvis w/ Oral Cont (10.16.23 @ 20:44) >  IMPRESSION:    No obstruction or intraperitoneal free air.    Multiple lytic lesions of the spine and pelvis, consistent with known   history of multiple myeloma.    < end of copied text >      < from: Xray Chest 1 View-PORTABLE IMMEDIATE (Xray Chest 1 View-PORTABLE IMMEDIATE .) (10.16.23 @ 16:24) >  Impression:    Small bilateral pleural effusions.    < end of copied text >      < from: TTE Echo Complete w/o Contrast w/ Doppler (10.17.23 @ 07:43) >    Summary:   1. LV Ejection Fraction by De La Cruz's Method with a biplane EF of 61 %.   2. Normal global left ventricular systolic function.    < end of copied text >        Case discussed with residents and RN on rounds today    Care discussed with pt        
24H events:    Patient is a 58y old Male who presents with a chief complaint of Acute renal failure     (19 Oct 2023 13:28)    Primary diagnosis of Abdominal pain    Today is hospital day 3d. This morning patient was seen and examined at bedside, resting comfortably in bed.    No acute or major events overnight. Hemodynamically stable, tolerating oral diet, voiding appropriately with appropriate bowel movements.     Patient reports resolution of L flank pain that he initially presented with. He stated that he has not been able to eat because he doesn't have dentures and his cheeks are swollen. He would like someone to see him for possible dentures.     PAST MEDICAL & SURGICAL HISTORY  Multiple myeloma, remission status unspecified    No significant past surgical history    ALLERGIES:  No Known Allergies    MEDICATIONS:  STANDING MEDICATIONS  heparin   Injectable 5000 Unit(s) SubCutaneous every 12 hours    PRN MEDICATIONS  acetaminophen     Tablet .. 650 milliGRAM(s) Oral every 6 hours PRN  methocarbamol 500 milliGRAM(s) Oral every 8 hours PRN  oxyCODONE    IR 5 milliGRAM(s) Oral every 8 hours PRN    VITALS:   T(F): 98.1  HR: 79  BP: 165/77  RR: 18  SpO2: 97%    PHYSICAL EXAM:  GENERAL: NAD, lying in bed comfortably  HEAD: NCAD, no hematoma or laceration   NECK: Supple, no neck stiffness/nuchal rigidity, no JVD    HEART: Regular rate and rhythm, normal S1/S2, no murmurs  LUNGS: No acute respiratory distress, clear b/l breath sounds  ABDOMEN:  soft, nontender, nondistended  EXTREMITIES: no rashes, extremities warm/dry, no cyanosis, no edema  NERVOUS SYSTEM:  A&Ox3      LABS:                        9.2    8.06  )-----------( 237      ( 19 Oct 2023 07:10 )             27.2     10-19    138  |  103  |  26<H>  ----------------------------<  70  4.4   |  24  |  2.2<H>    Ca    9.8      19 Oct 2023 07:10  Phos  3.7     10-19  Mg     1.8     10-19    TPro  5.9<L>  /  Alb  3.2<L>  /  TBili  0.2  /  DBili  x   /  AST  16  /  ALT  11  /  AlkPhos  57  10-19      Urinalysis Basic - ( 19 Oct 2023 07:10 )    Color: x / Appearance: x / SG: x / pH: x  Gluc: 70 mg/dL / Ketone: x  / Bili: x / Urobili: x   Blood: x / Protein: x / Nitrite: x   Leuk Esterase: x / RBC: x / WBC x   Sq Epi: x / Non Sq Epi: x / Bacteria: x        Troponin T, Serum: 0.07 ng/mL *HH* (10-18-23 @ 16:59)      CARDIAC MARKERS ( 18 Oct 2023 16:59 )  x     / 0.07 ng/mL / x     / x     / x      CARDIAC MARKERS ( 18 Oct 2023 11:49 )  x     / 0.07 ng/mL / x     / x     / x          RADIOLOGY:    < from: CT Abdomen and Pelvis w/ Oral Cont (10.16.23 @ 20:44) >    ACC: [id number] EXAM:  CT ABDOMEN AND PELVIS OC   ORDERED BY: DAVID MILAN     PROCEDURE DATE:  10/16/2023          INTERPRETATION:  CLINICAL HISTORY / REASON FOR EXAM: Abdominal pain;   history of multiple myeloma.    TECHNIQUE: Contiguous axial CT images were obtained from the lower chest   to the pubic symphysis without intravenous contrast. Oral contrast was   administered. Reformatted images in the coronal and sagittal planes were   acquired.    COMPARISON CT: None    OTHER STUDIES USED FOR CORRELATION: None.      FINDINGS:    LOWER CHEST: Bilateral lower lobe atelectasis and trace left pleural   effusion.    HEPATOBILIARY: Cholelithiasis.    SPLEEN: Unremarkable.    PANCREAS: Unremarkable.    ADRENAL GLANDS: Unremarkable.    KIDNEYS: No evidence of hydronephrosis.    ABDOMINOPELVIC NODES: Unremarkable.    PELVIC ORGANS: Unremarkable.    PERITONEUM/MESENTERY/BOWEL: Colonic diverticulosis. No bowel obstruction   or intraperitoneal free air. Mesenteric edema.    BONES/SOFT TISSUES: Status post kyphoplasty of T11-L1 and L4, L5.   Scattered lytic lesions of multiple vertebral bodies, ribs, portions of   the bilateral iliac bones and sacrum. Diffuse osteopenia. Degenerative   changes of the thoracolumbar spine. Anasarca.    VASCULAR: Calcified atherosclerotic disease within the abdominal aorta.      IMPRESSION:    No obstruction or intraperitoneal free air.    Multiple lytic lesions of the spine and pelvis, consistent with known   history of multiple myeloma.    --- End of Report ---            FRED ARSHAD MD; Attending Radiologist  This document has been electronically signed. Oct 16 2023  9:52PM    < end of copied text >    
HPI  Patient is a 58y old Male who presents with a chief complaint of Left flank pain (22 Oct 2023 23:27)    Currently admitted to medicine with the primary diagnosis of Abdominal pain       Today is hospital day 7d.     INTERVAL HPI / OVERNIGHT EVENTS:  Patient was seen and examined at bedside  states he feels uncofmrtable to go home and prefers to go to [facility] for rehab  planning for follow up with oncologist   feels weak; has some SOB with activity      PAST MEDICAL & SURGICAL HISTORY  Multiple myeloma, remission status unspecified    No significant past surgical history      ALLERGIES  No Known Allergies    MEDICATIONS  STANDING MEDICATIONS  acetaminophen     Tablet .. 975 milliGRAM(s) Oral every 6 hours  bisacodyl Suppository 10 milliGRAM(s) Rectal once  chlorhexidine 2% Cloths 1 Application(s) Topical daily  folic acid 1 milliGRAM(s) Oral daily  heparin   Injectable 5000 Unit(s) SubCutaneous every 12 hours  methocarbamol 500 milliGRAM(s) Oral three times a day    PRN MEDICATIONS  oxyCODONE    IR 5 milliGRAM(s) Oral every 8 hours PRN    VITALS:  T(F): 98  HR: 77  BP: 117/57  RR: 18  SpO2: 96%    PHYSICAL EXAM  GEN: no distress, comfortable  PULM: BS heard b/l equal, No wheezing  CVS: S1S2 present, no rubs or gallops  ABD: Soft, non-distended, no guarding; non-tender  EXT: No lower extremity edema  NEURO: A&Ox3, moving all extremities    LABS                        10.5   10.78 )-----------( 284      ( 23 Oct 2023 07:43 )             31.6     10-23    139  |  101  |  20  ----------------------------<  75  4.6   |  25  |  2.1<H>    Ca    10.4      23 Oct 2023 07:43  Phos  3.6     10-23  Mg     1.9     10-23    TPro  6.8  /  Alb  3.6  /  TBili  0.3  /  DBili  x   /  AST  56<H>  /  ALT  36  /  AlkPhos  57  10-23      Urinalysis Basic - ( 23 Oct 2023 07:43 )    Color: x / Appearance: x / SG: x / pH: x  Gluc: 75 mg/dL / Ketone: x  / Bili: x / Urobili: x   Blood: x / Protein: x / Nitrite: x   Leuk Esterase: x / RBC: x / WBC x   Sq Epi: x / Non Sq Epi: x / Bacteria: x                RADIOLOGY    
NEPHROLOGY FOLLOW UP NOTE    no new events  still with chest wall discomfort       PAST MEDICAL & SURGICAL HISTORY:  Multiple myeloma, remission status unspecified      No significant past surgical history        Allergies:  No Known Allergies    Home Medications Reviewed    SOCIAL HISTORY:  Denies ETOH,Smoking,   FAMILY HISTORY:  No pertinent family history in first degree relatives          REVIEW OF SYSTEMS:  as above  All other review of systems is negative unless indicated above.    PHYSICAL EXAM:  Constitutional: frail  HEENT: poor dentition  Neck: No JVD  Respiratory: CTAB, no wheezes, rales or rhonchi  Cardiovascular: S1, S2, RRR  Gastrointestinal: BS+, soft, NT/ND  Extremities: No cyanosis or clubbing. No peripheral edema  Neurological: A/O x 3, no focal deficits  Psychiatric: Normal mood, normal affect  : No CVA tenderness. No mcdonnell.   Skin: No rashes    Hospital Medications:   MEDICATIONS  (STANDING):  acetaminophen     Tablet .. 975 milliGRAM(s) Oral every 6 hours  bisacodyl Suppository 10 milliGRAM(s) Rectal once  chlorhexidine 2% Cloths 1 Application(s) Topical daily  folic acid 1 milliGRAM(s) Oral daily  heparin   Injectable 5000 Unit(s) SubCutaneous every 12 hours  methocarbamol 500 milliGRAM(s) Oral three times a day        VITALS:  T(F): 97.1 (10-25-23 @ 12:55), Max: 97.7 (10-24-23 @ 21:20)  HR: 90 (10-25-23 @ 12:55)  BP: 113/59 (10-25-23 @ 12:55)  RR: 20 (10-25-23 @ 12:55)  SpO2: --  Wt(kg): --        LABS:  10-25    134<L>  |  98  |  22<H>  ----------------------------<  85  4.4   |  27  |  2.2<H>    Ca    11.0<H>      25 Oct 2023 08:44  Mg     1.9     10-24                            9.9    10.21 )-----------( 270      ( 25 Oct 2023 08:44 )             30.5       Urine Studies:  Urinalysis Basic - ( 25 Oct 2023 08:44 )    Color:  / Appearance:  / SG:  / pH:   Gluc: 85 mg/dL / Ketone:   / Bili:  / Urobili:    Blood:  / Protein:  / Nitrite:    Leuk Esterase:  / RBC:  / WBC    Sq Epi:  / Non Sq Epi:  / Bacteria:           RADIOLOGY & ADDITIONAL STUDIES:  
NEPHROLOGY FOLLOW UP NOTE    pt seen this AM  no new complaints      PAST MEDICAL & SURGICAL HISTORY:  Multiple myeloma, remission status unspecified      No significant past surgical history        Allergies:  No Known Allergies    Home Medications Reviewed    SOCIAL HISTORY:  Denies ETOH,Smoking,   FAMILY HISTORY:  No pertinent family history in first degree relatives          REVIEW OF SYSTEMS:  as above  All other review of systems is negative unless indicated above.    PHYSICAL EXAM:  Constitutional: frail  HEENT: poor dentition  Neck: No JVD  Respiratory: CTAB, no wheezes, rales or rhonchi  Cardiovascular: S1, S2, RRR  Gastrointestinal: BS+, soft, NT/ND  Extremities: No cyanosis or clubbing. No peripheral edema  Neurological: A/O x 3, no focal deficits  Psychiatric: Normal mood, normal affect  : No CVA tenderness. No mcdonnell.   Skin: No rashes    Hospital Medications:   MEDICATIONS  (STANDING):  acetaminophen     Tablet .. 975 milliGRAM(s) Oral every 6 hours  bisacodyl Suppository 10 milliGRAM(s) Rectal once  chlorhexidine 2% Cloths 1 Application(s) Topical daily  folic acid 1 milliGRAM(s) Oral daily  heparin   Injectable 5000 Unit(s) SubCutaneous every 12 hours  methocarbamol 500 milliGRAM(s) Oral three times a day        VITALS:  T(F): 97.8 (10-24-23 @ 05:36), Max: 98 (10-23-23 @ 13:15)  HR: 77 (10-24-23 @ 05:36)  BP: 124/65 (10-24-23 @ 05:36)  RR: 18 (10-24-23 @ 05:36)  SpO2: --  Wt(kg): --        LABS:  10-24    137  |  100  |  21<H>  ----------------------------<  83  4.5   |  28  |  2.0<H>    Ca    11.0<H>      24 Oct 2023 08:43  Phos  3.6     10-23  Mg     1.9     10-24    TPro  6.8  /  Alb  3.6  /  TBili  0.3  /  DBili      /  AST  56<H>  /  ALT  36  /  AlkPhos  57  10-23                          10.4   10.31 )-----------( 294      ( 24 Oct 2023 08:43 )             31.2       Urine Studies:  Urinalysis Basic - ( 24 Oct 2023 08:43 )    Color:  / Appearance:  / SG:  / pH:   Gluc: 83 mg/dL / Ketone:   / Bili:  / Urobili:    Blood:  / Protein:  / Nitrite:    Leuk Esterase:  / RBC:  / WBC    Sq Epi:  / Non Sq Epi:  / Bacteria:       Creatinine, Random Urine: 83 mg/dL (10-18 @ 17:07)  Protein/Creatinine Ratio Calculation: 2.6 Ratio (10-18 @ 17:07)      RADIOLOGY & ADDITIONAL STUDIES:      
SUBJECTIVE:    Patient is a 58y old Male who presents with a chief complaint of Left flank pain (26 Oct 2023 16:16)      HPI:  58-year-old male past medical history of multiple myeloma diagnosed in 2017 not on any chemotherapy or radiation coming in with complaints of Left flank pain. The pain started 2 days prior to admission, scale 10/10, on feels the pain while moving, walking, or changing positions, when he stays still there is no pain at all. Patient also has nausea and loss of apetitie for the past few days. He reports that on last week he had several episodes of diarrhea which resolved after taking imodium.    In the ED vitals were stable.  wbc showed mild leukocytosis  troponin were 0.06  EKG shows new III and aVF t wave inversions  CT scan with oral contrast shows no obstruction. It shows multiple lytic lesions of the spine and pelvis, consistent with known   history of multiple myeloma.       (17 Oct 2023 00:10)      Currently admitted to medicine with the primary diagnosis of Abdominal pain    not endorsing any pain, wanting to go to rehab    Besides the pertinent positives and negatives described above, the ROS was within normal limits.    PAST MEDICAL & SURGICAL HISTORY  Multiple myeloma, remission status unspecified    No significant past surgical history      SOCIAL HISTORY:    ALLERGIES:  No Known Allergies    MEDICATIONS:  STANDING MEDICATIONS  acetaminophen     Tablet .. 975 milliGRAM(s) Oral every 6 hours  bisacodyl Suppository 10 milliGRAM(s) Rectal once  chlorhexidine 2% Cloths 1 Application(s) Topical daily  dextrose 5% + sodium chloride 0.9%. 1000 milliLiter(s) IV Continuous <Continuous>  folic acid 1 milliGRAM(s) Oral daily  heparin   Injectable 5000 Unit(s) SubCutaneous every 12 hours  methocarbamol 500 milliGRAM(s) Oral three times a day    PRN MEDICATIONS  oxyCODONE    IR 5 milliGRAM(s) Oral every 8 hours PRN    VITALS:   T(F): 96.7  HR: 90  BP: 118/66  RR: 18  SpO2: --    LABS:                        10.7   10.27 )-----------( 277      ( 26 Oct 2023 08:59 )             32.7     10-26    137  |  99  |  23<H>  ----------------------------<  105<H>  4.8   |  30  |  2.4<H>    Ca    12.0<H>      26 Oct 2023 08:59  Mg     2.1     10-26    TPro  7.3  /  Alb  3.6  /  TBili  0.3  /  DBili  x   /  AST  40  /  ALT  40  /  AlkPhos  59  10-26      Urinalysis Basic - ( 26 Oct 2023 08:59 )    Color: x / Appearance: x / SG: x / pH: x  Gluc: 105 mg/dL / Ketone: x  / Bili: x / Urobili: x   Blood: x / Protein: x / Nitrite: x   Leuk Esterase: x / RBC: x / WBC x   Sq Epi: x / Non Sq Epi: x / Bacteria: x                Abdominal pain      RADIOLOGY:    PHYSICAL EXAM:  General: WN/WD NAD  Neurology: A&Ox3, nonfocal, ZARATE x 4  Head:  Normocephalic, atraumatic  ENT:  Mucosa moist, no ulcerations  Neck:  Supple, no sinuses or palpable masses  Lymphatic:  No palpable cervical, supraclavicular, axillary or inguinal adenopathy  Respiratory: CTA B/L  CV: RRR, S1S2, no murmur  Abdominal: Soft, NT, ND no palpable mass  MSK: No edema, + peripheral pulses  Incisions: intact, no erythema or drainage    Intravenous access: yes  NG tube: no  Michael Catheter: no       
  NAN GONZALEZ  58y Male    INTERVAL HPI/OVERNIGHT EVENTS:    pt states spasms are better with current pain regimen   eating now  no fever  wants to restart treatment for MM as soon as possible (he states he stopped treatment 1.5 years ago because he developed significant LE swelling that he attributed to the treatment).  sister at bedside  renal attending spoke to pt and sister in detail today    T(F): 97.6 (10-18-23 @ 08:09), Max: 98.7 (10-17-23 @ 15:49)  HR: 69 (10-18-23 @ 08:09) (69 - 78)  BP: 124/58 (10-18-23 @ 08:09) (107/51 - 124/58)  RR: 18 (10-18-23 @ 08:09) (18 - 18)  SpO2: 97% (10-18-23 @ 08:09) (97% - 99%) on RA    PHYSICAL EXAM:  GENERAL: NAD  HEAD:  Normocephalic  EYES:  conjunctiva and sclera clear  ENMT: Moist mucous membranes  NECK: Supple  NERVOUS SYSTEM:  Alert, awake, Good concentration  CHEST/LUNG: CTA b/l  HEART: Regular rate and rhythm  ABDOMEN: Soft, Nontender, Nondistended; Bowel sounds present  EXTREMITIES: No edema LE  SKIN: warm, dry    Consultant(s) Notes Reviewed:  [x ] YES  [ ] NO  Care Discussed with Consultants/Other Providers [ x] YES  [ ] NO    MEDICATIONS  (STANDING):  heparin   Injectable 5000 Unit(s) SubCutaneous every 12 hours  sodium chloride 0.9%. 1000 milliLiter(s) (70 mL/Hr) IV Continuous <Continuous>    MEDICATIONS  (PRN):  acetaminophen     Tablet .. 650 milliGRAM(s) Oral every 6 hours PRN Mild Pain (1 - 3)  methocarbamol 500 milliGRAM(s) Oral every 8 hours PRN Muscle Spasm  oxyCODONE    IR 5 milliGRAM(s) Oral every 8 hours PRN Moderate Pain (4 - 6)      Telemetry reviewed by me    LABS:                        9.1    9.34  )-----------( 230      ( 18 Oct 2023 06:51 )             27.8     10-18    137  |  102  |  31<H>  ----------------------------<  74  4.2   |  24  |  2.2<H>    Ca    9.9      18 Oct 2023 06:51  Mg     1.8     10-18    TPro  5.9<L>  /  Alb  3.3<L>  /  TBili  0.3  /  DBili  x   /  AST  17  /  ALT  11  /  AlkPhos  57  10-18      CARDIAC MARKERS ( 17 Oct 2023 11:30 )  x     / 0.06 ng/mL / x     / x     / x      CARDIAC MARKERS ( 17 Oct 2023 07:20 )  x     / 0.07 ng/mL / x     / x     / x      CARDIAC MARKERS ( 16 Oct 2023 15:43 )  x     / 0.06 ng/mL / x     / x     / x              RADIOLOGY & ADDITIONAL TESTS:    Imaging or report Personally Reviewed:  [x ] YES  [ ] NO    Case discussed with resident and RN on rounds today    Care discussed with pt and family        
NEPHROLOGY FOLLOW UP NOTE    Ca++ worse  c/o generalized weakness       PAST MEDICAL & SURGICAL HISTORY:  Multiple myeloma, remission status unspecified      No significant past surgical history        Allergies:  No Known Allergies    Home Medications Reviewed    SOCIAL HISTORY:  Denies ETOH,Smoking,   FAMILY HISTORY:  No pertinent family history in first degree relatives          REVIEW OF SYSTEMS:  as above  All other review of systems is negative unless indicated above.    PHYSICAL EXAM:  Constitutional: frail  HEENT: poor dentition  Neck: No JVD  Respiratory: CTAB, no wheezes, rales or rhonchi  Cardiovascular: S1, S2, RRR  Gastrointestinal: BS+, soft, NT/ND  Extremities: No cyanosis or clubbing. No peripheral edema  Neurological: A/O x 3, no focal deficits  Psychiatric: Normal mood, normal affect  : No CVA tenderness. No mcdonnell.   Skin: No rashes    Hospital Medications:   MEDICATIONS  (STANDING):  acetaminophen     Tablet .. 975 milliGRAM(s) Oral every 6 hours  bisacodyl Suppository 10 milliGRAM(s) Rectal once  chlorhexidine 2% Cloths 1 Application(s) Topical daily  dextrose 5% + sodium chloride 0.9%. 1000 milliLiter(s) (75 mL/Hr) IV Continuous <Continuous>  folic acid 1 milliGRAM(s) Oral daily  glycerin Suppository - Adult 1 Suppository(s) Rectal once  heparin   Injectable 5000 Unit(s) SubCutaneous every 12 hours  methocarbamol 500 milliGRAM(s) Oral three times a day        VITALS:  T(F): 96.7 (10-26-23 @ 13:43), Max: 98.1 (10-25-23 @ 20:13)  HR: 90 (10-26-23 @ 13:43)  BP: 118/66 (10-26-23 @ 13:43)  RR: 18 (10-26-23 @ 13:43)  SpO2: --  Wt(kg): --        LABS:  10-26    137  |  99  |  23<H>  ----------------------------<  105<H>  4.8   |  30  |  2.4<H>    Ca    12.0<H>      26 Oct 2023 08:59  Mg     2.1     10-26    TPro  7.3  /  Alb  3.6  /  TBili  0.3  /  DBili      /  AST  40  /  ALT  40  /  AlkPhos  59  10-26                          10.7   10.27 )-----------( 277      ( 26 Oct 2023 08:59 )             32.7       Urine Studies:  Urinalysis Basic - ( 26 Oct 2023 08:59 )    Color:  / Appearance:  / SG:  / pH:   Gluc: 105 mg/dL / Ketone:   / Bili:  / Urobili:    Blood:  / Protein:  / Nitrite:    Leuk Esterase:  / RBC:  / WBC    Sq Epi:  / Non Sq Epi:  / Bacteria:           RADIOLOGY & ADDITIONAL STUDIES:  
NEPHROLOGY FOLLOW UP NOTE    awaiting STR  cr stable      PAST MEDICAL & SURGICAL HISTORY:  Multiple myeloma, remission status unspecified      No significant past surgical history        Allergies:  No Known Allergies    Home Medications Reviewed    SOCIAL HISTORY:  Denies ETOH,Smoking,   FAMILY HISTORY:  No pertinent family history in first degree relatives          REVIEW OF SYSTEMS:  as above  All other review of systems is negative unless indicated above.    PHYSICAL EXAM:  Constitutional: frail  HEENT: poor dentition  Neck: No JVD  Respiratory: CTAB, no wheezes, rales or rhonchi  Cardiovascular: S1, S2, RRR  Gastrointestinal: BS+, soft, NT/ND  Extremities: No cyanosis or clubbing. No peripheral edema  Neurological: A/O x 3, no focal deficits  Psychiatric: Normal mood, normal affect  : No CVA tenderness. No mcdonnell.   Skin: No rashes    Hospital Medications:   MEDICATIONS  (STANDING):  acetaminophen     Tablet .. 975 milliGRAM(s) Oral every 6 hours  bisacodyl Suppository 10 milliGRAM(s) Rectal once  chlorhexidine 2% Cloths 1 Application(s) Topical daily  folic acid 1 milliGRAM(s) Oral daily  heparin   Injectable 5000 Unit(s) SubCutaneous every 12 hours  methocarbamol 500 milliGRAM(s) Oral three times a day        VITALS:  T(F): 98 (10-23-23 @ 13:15), Max: 98.1 (10-22-23 @ 19:54)  HR: 91 (10-23-23 @ 13:15)  BP: 132/64 (10-23-23 @ 13:15)  RR: 18 (10-23-23 @ 13:15)  SpO2: 96% (10-22-23 @ 19:46)  Wt(kg): --        LABS:  10-23    139  |  101  |  20  ----------------------------<  75  4.6   |  25  |  2.1<H>    Ca    10.4      23 Oct 2023 07:43  Phos  3.6     10-23  Mg     1.9     10-23    TPro  6.8  /  Alb  3.6  /  TBili  0.3  /  DBili      /  AST  56<H>  /  ALT  36  /  AlkPhos  57  10-23                          10.5   10.78 )-----------( 284      ( 23 Oct 2023 07:43 )             31.6       Urine Studies:  Urinalysis Basic - ( 23 Oct 2023 07:43 )    Color:  / Appearance:  / SG:  / pH:   Gluc: 75 mg/dL / Ketone:   / Bili:  / Urobili:    Blood:  / Protein:  / Nitrite:    Leuk Esterase:  / RBC:  / WBC    Sq Epi:  / Non Sq Epi:  / Bacteria:       Creatinine, Random Urine: 83 mg/dL (10-18 @ 17:07)  Protein/Creatinine Ratio Calculation: 2.6 Ratio (10-18 @ 17:07)      RADIOLOGY & ADDITIONAL STUDIES:    
24H events:    Patient is a 58y old Male who presents with a chief complaint of Left flank pain (19 Oct 2023 17:07)    Primary diagnosis of Abdominal pain    Today is hospital day 4d. This morning patient was seen and examined at bedside, resting comfortably in bed.    No acute or major events overnight. Hemodynamically stable, tolerating oral diet, voiding appropriately with appropriate bowel movements.       Family communication:  Contact date: 10/20/2023  Name of person contacted: Madisyn  Relationship to patient: Sister      PAST MEDICAL & SURGICAL HISTORY  Multiple myeloma, remission status unspecified    No significant past surgical history    ALLERGIES:  No Known Allergies    MEDICATIONS:  STANDING MEDICATIONS  acetaminophen     Tablet .. 975 milliGRAM(s) Oral every 6 hours  chlorhexidine 2% Cloths 1 Application(s) Topical daily  heparin   Injectable 5000 Unit(s) SubCutaneous every 12 hours  magnesium sulfate  IVPB 1 Gram(s) IV Intermittent once  methocarbamol 500 milliGRAM(s) Oral three times a day    PRN MEDICATIONS  oxyCODONE    IR 5 milliGRAM(s) Oral every 8 hours PRN    VITALS:   T(F): 97.6  HR: 67  BP: 129/70  RR: 18  SpO2: --    PHYSICAL EXAM:  GENERAL: NAD, lying in bed comfortably  HEAD: NCAD, no hematoma or laceration   NECK: Supple, no neck stiffness/nuchal rigidity, no JVD    HEART: Regular rate and rhythm, normal S1/S2, no murmurs  LUNGS: No acute respiratory distress, clear b/l breath sounds  ABDOMEN:  soft, nontender, nondistended  EXTREMITIES: no rashes, extremities warm/dry, no cyanosis, no edema  NERVOUS SYSTEM:  A&Ox3       LABS:                        9.4    7.09  )-----------( 256      ( 20 Oct 2023 06:55 )             28.7     10-20    138  |  104  |  23<H>  ----------------------------<  74  4.4   |  25  |  2.1<H>    Ca    9.6      20 Oct 2023 06:55  Phos  3.2     10-20  Mg     1.7     10-20    TPro  6.2  /  Alb  3.4<L>  /  TBili  0.3  /  DBili  x   /  AST  18  /  ALT  11  /  AlkPhos  57  10-20      Urinalysis Basic - ( 20 Oct 2023 06:55 )    Color: x / Appearance: x / SG: x / pH: x  Gluc: 74 mg/dL / Ketone: x  / Bili: x / Urobili: x   Blood: x / Protein: x / Nitrite: x   Leuk Esterase: x / RBC: x / WBC x   Sq Epi: x / Non Sq Epi: x / Bacteria: x            CARDIAC MARKERS ( 18 Oct 2023 16:59 )  x     / 0.07 ng/mL / x     / x     / x      CARDIAC MARKERS ( 18 Oct 2023 11:49 )  x     / 0.07 ng/mL / x     / x     / x          RADIOLOGY:        
24H events:    Patient is a 58y old Male who presents with a chief complaint of Left flank pain (21 Oct 2023 06:15)    Primary diagnosis of Abdominal pain      Today is hospital day 5d. This morning patient was seen and examined at bedside, resting comfortably in bed.    No acute or major events overnight. Hemodynamically stable, tolerating oral diet, voiding appropriately with appropriate bowel movements.     PAST MEDICAL & SURGICAL HISTORY  Multiple myeloma, remission status unspecified    No significant past surgical history      SOCIAL HISTORY:  Social History:      ALLERGIES:  No Known Allergies    MEDICATIONS:  STANDING MEDICATIONS  acetaminophen     Tablet .. 975 milliGRAM(s) Oral every 6 hours  chlorhexidine 2% Cloths 1 Application(s) Topical daily  folic acid 1 milliGRAM(s) Oral daily  heparin   Injectable 5000 Unit(s) SubCutaneous every 12 hours  methocarbamol 500 milliGRAM(s) Oral three times a day    PRN MEDICATIONS  oxyCODONE    IR 5 milliGRAM(s) Oral every 8 hours PRN    VITALS:   T(F): 97.4  HR: 68  BP: 131/58  RR: 18  SpO2: --    PHYSICAL EXAM:  GENERAL: NAD, lying in bed comfortably  HEAD: NCAD, no hematoma or laceration   NECK: Supple, no neck stiffness/nuchal rigidity, no JVD    HEART: Regular rate and rhythm, normal S1/S2, no murmurs  LUNGS: No acute respiratory distress, clear b/l breath sounds  ABDOMEN:  soft, nontender, nondistended  EXTREMITIES: no rashes, extremities warm/dry, no cyanosis, no edema  NERVOUS SYSTEM:  A&Ox3         LABS:                        9.7    8.01  )-----------( 270      ( 21 Oct 2023 06:50 )             28.9     10-21    135  |  99  |  21<H>  ----------------------------<  77  4.4   |  24  |  2.2<H>    Ca    9.8      21 Oct 2023 06:50  Phos  3.1     10-21  Mg     2.0     10-21    TPro  6.4  /  Alb  3.3<L>  /  TBili  0.2  /  DBili  x   /  AST  19  /  ALT  12  /  AlkPhos  54  10-21      Urinalysis Basic - ( 21 Oct 2023 06:50 )    Color: x / Appearance: x / SG: x / pH: x  Gluc: 77 mg/dL / Ketone: x  / Bili: x / Urobili: x   Blood: x / Protein: x / Nitrite: x   Leuk Esterase: x / RBC: x / WBC x   Sq Epi: x / Non Sq Epi: x / Bacteria: x                RADIOLOGY:    No radiographic images in the past 24 hours.    
HPI  Patient is a 58y old Male who presents with a chief complaint of Left flank pain (20 Oct 2023 08:52)    Currently admitted to medicine with the primary diagnosis of Abdominal pain       Today is hospital day 4d.     INTERVAL HPI / OVERNIGHT EVENTS:  Patient was seen and examined at bedside  c/o muscle spasma; waiting for PT  c/o gum problem; states he cannot eat well- awaiting dental eval  also awaiting oncology eval        PAST MEDICAL & SURGICAL HISTORY  Multiple myeloma, remission status unspecified    No significant past surgical history      ALLERGIES  No Known Allergies    MEDICATIONS  STANDING MEDICATIONS  acetaminophen     Tablet .. 975 milliGRAM(s) Oral every 6 hours  chlorhexidine 2% Cloths 1 Application(s) Topical daily  heparin   Injectable 5000 Unit(s) SubCutaneous every 12 hours  methocarbamol 500 milliGRAM(s) Oral three times a day    PRN MEDICATIONS  oxyCODONE    IR 5 milliGRAM(s) Oral every 8 hours PRN    VITALS:  T(F): 97.6  HR: 67  BP: 129/70  RR: 18  SpO2: --    PHYSICAL EXAM  GEN: no distress, comfortable  mild erythema in th ebuccal muca- no tear; poor dental hygiene  PULM: BS heard b/l equal, No wheezing  CVS: S1S2 present, no rubs or gallops  ABD: Soft, non-distended, no guarding; non-tender  EXT: No lower extremity edema  NEURO: A&Ox3    LABS                        9.4    7.09  )-----------( 256      ( 20 Oct 2023 06:55 )             28.7     10-20    138  |  104  |  23<H>  ----------------------------<  74  4.4   |  25  |  2.1<H>    Ca    9.6      20 Oct 2023 06:55  Phos  3.2     10-20  Mg     1.7     10-20    TPro  6.2  /  Alb  3.4<L>  /  TBili  0.3  /  DBili  x   /  AST  18  /  ALT  11  /  AlkPhos  57  10-20      Urinalysis Basic - ( 20 Oct 2023 06:55 )    Color: x / Appearance: x / SG: x / pH: x  Gluc: 74 mg/dL / Ketone: x  / Bili: x / Urobili: x   Blood: x / Protein: x / Nitrite: x   Leuk Esterase: x / RBC: x / WBC x   Sq Epi: x / Non Sq Epi: x / Bacteria: x            CARDIAC MARKERS ( 18 Oct 2023 16:59 )  x     / 0.07 ng/mL / x     / x     / x          RADIOLOGY    
NEPHROLOGY FOLLOW UP NOTE    pt seen and examined  on ivf  cr stable  + void      PAST MEDICAL & SURGICAL HISTORY:  Multiple myeloma, remission status unspecified      No significant past surgical history        Allergies:  No Known Allergies    Home Medications Reviewed    SOCIAL HISTORY:  Denies ETOH,Smoking,   FAMILY HISTORY:  No pertinent family history in first degree relatives          REVIEW OF SYSTEMS:  as above  All other review of systems is negative unless indicated above.    PHYSICAL EXAM:  Constitutional: frail  HEENT: poor dentition  Neck: No JVD  Respiratory: CTAB, no wheezes, rales or rhonchi  Cardiovascular: S1, S2, RRR  Gastrointestinal: BS+, soft, NT/ND  Extremities: No cyanosis or clubbing. No peripheral edema  Neurological: A/O x 3, no focal deficits  Psychiatric: Normal mood, normal affect  : No CVA tenderness. No mcdonnell.   Skin: No rashes    Hospital Medications:   MEDICATIONS  (STANDING):  acetaminophen     Tablet .. 975 milliGRAM(s) Oral every 6 hours  chlorhexidine 2% Cloths 1 Application(s) Topical daily  heparin   Injectable 5000 Unit(s) SubCutaneous every 12 hours  methocarbamol 500 milliGRAM(s) Oral three times a day        VITALS:  T(F): 98.1 (10-19-23 @ 14:05), Max: 98.2 (10-18-23 @ 20:39)  HR: 79 (10-19-23 @ 14:05)  BP: 165/77 (10-19-23 @ 14:05)  RR: 18 (10-19-23 @ 14:05)  SpO2: 97% (10-18-23 @ 20:39)  Wt(kg): --    10-18 @ 07:01  -  10-19 @ 07:00  --------------------------------------------------------  IN: 70 mL / OUT: 600 mL / NET: -530 mL    10-19 @ 07:01  -  10-19 @ 17:08  --------------------------------------------------------  IN: 980 mL / OUT: 600 mL / NET: 380 mL      Height (cm): 180.3 (10-19 @ 13:28)    LABS:  10-19    138  |  103  |  26<H>  ----------------------------<  70  4.4   |  24  |  2.2<H>    Ca    9.8      19 Oct 2023 07:10  Phos  3.7     10-19  Mg     1.8     10-19    TPro  5.9<L>  /  Alb  3.2<L>  /  TBili  0.2  /  DBili      /  AST  16  /  ALT  11  /  AlkPhos  57  10-19                          9.2    8.06  )-----------( 237      ( 19 Oct 2023 07:10 )             27.2       Urine Studies:  Urinalysis Basic - ( 19 Oct 2023 07:10 )    Color:  / Appearance:  / SG:  / pH:   Gluc: 70 mg/dL / Ketone:   / Bili:  / Urobili:    Blood:  / Protein:  / Nitrite:    Leuk Esterase:  / RBC:  / WBC    Sq Epi:  / Non Sq Epi:  / Bacteria:       Creatinine, Random Urine: 83 mg/dL (10-18 @ 17:07)  Protein/Creatinine Ratio Calculation: 2.6 Ratio (10-18 @ 17:07)      RADIOLOGY & ADDITIONAL STUDIES:  
HPI  Patient is a 58y old Male who presents with a chief complaint of Left flank pain (21 Oct 2023 10:20)    Currently admitted to medicine with the primary diagnosis of Abdominal pain       Today is hospital day 6d.     INTERVAL HPI / OVERNIGHT EVENTS:  Patient was seen and examined at bedside  he would like to go home  spams is feeling okay  would like to have wheel chair  Denies any complains of chest pain or shortness of breath  Denies any abdominal pain/nausea/vomiting        PAST MEDICAL & SURGICAL HISTORY  Multiple myeloma, remission status unspecified    No significant past surgical history      ALLERGIES  No Known Allergies    MEDICATIONS  STANDING MEDICATIONS  acetaminophen     Tablet .. 975 milliGRAM(s) Oral every 6 hours  bisacodyl Suppository 10 milliGRAM(s) Rectal once  chlorhexidine 2% Cloths 1 Application(s) Topical daily  folic acid 1 milliGRAM(s) Oral daily  heparin   Injectable 5000 Unit(s) SubCutaneous every 12 hours  lactulose Syrup 20 Gram(s) Oral every 2 hours  methocarbamol 500 milliGRAM(s) Oral three times a day  polyethylene glycol 3350 17 Gram(s) Oral daily  senna 2 Tablet(s) Oral at bedtime    PRN MEDICATIONS  oxyCODONE    IR 5 milliGRAM(s) Oral every 8 hours PRN    VITALS:  T(F): 96.7  HR: 84  BP: 123/61  RR: 18  SpO2: 97%    PHYSICAL EXAM  GEN: no distress, comfortable  PULM: BS heard b/l equal, No wheezing  CVS: S1S2 present, no rubs or gallops  ABD: Soft, non-distended, no guarding; non-tender  EXT: No lower extremity edema  NEURO: A&Ox3, moving all extremities    LABS                        10.3   9.41  )-----------( 274      ( 22 Oct 2023 07:58 )             30.6     10-22    136  |  101  |  20  ----------------------------<  80  4.4   |  25  |  2.1<H>    Ca    10.4      22 Oct 2023 07:58  Phos  3.7     10-22  Mg     2.0     10-22    TPro  6.5  /  Alb  3.4<L>  /  TBili  0.2  /  DBili  x   /  AST  23  /  ALT  15  /  AlkPhos  54  10-22      Urinalysis Basic - ( 22 Oct 2023 07:58 )    Color: x / Appearance: x / SG: x / pH: x  Gluc: 80 mg/dL / Ketone: x  / Bili: x / Urobili: x   Blood: x / Protein: x / Nitrite: x   Leuk Esterase: x / RBC: x / WBC x   Sq Epi: x / Non Sq Epi: x / Bacteria: x                RADIOLOGY    
Patient is a 58y old  Male who presents with a chief complaint of Left flank pain (22 Oct 2023 16:18)       58-year-old male past medical history of multiple myeloma diagnosed in 2017 not on any chemotherapy or radiation coming in with complaints of Left flank pain. The pain started 2 days prior to admission, scale 10/10, on feels the pain while moving, walking, or changing positions, when he stays still there is no pain at all. Patient also has nausea and loss of apetitie for the past few days. He reports that on last week he had several episodes of diarrhea which resolved after taking imodium.    In the ED vitals were stable.  wbc showed mild leukocytosis  troponin were 0.06  EKG shows new III and aVF t wave inversions  CT scan with oral contrast shows no obstruction. It shows multiple lytic lesions of the spine and pelvis, consistent with known   history of multiple myeloma. Pt is seen and examined  pt is awake and lying in bed.  c/c  back  pain  and  low pelvic pain           ROS:  Negative except for:    MEDICATIONS  (STANDING):  acetaminophen     Tablet .. 975 milliGRAM(s) Oral every 6 hours  bisacodyl Suppository 10 milliGRAM(s) Rectal once  chlorhexidine 2% Cloths 1 Application(s) Topical daily  folic acid 1 milliGRAM(s) Oral daily  heparin   Injectable 5000 Unit(s) SubCutaneous every 12 hours  methocarbamol 500 milliGRAM(s) Oral three times a day  polyethylene glycol 3350 17 Gram(s) Oral daily  senna 2 Tablet(s) Oral at bedtime    MEDICATIONS  (PRN):  oxyCODONE    IR 5 milliGRAM(s) Oral every 8 hours PRN Moderate Pain (4 - 6)      Allergies    No Known Allergies    Intolerances        Vital Signs Last 24 Hrs  T(C): 36.7 (22 Oct 2023 19:54), Max: 36.7 (22 Oct 2023 19:54)  T(F): 98.1 (22 Oct 2023 19:54), Max: 98.1 (22 Oct 2023 19:54)  HR: 88 (22 Oct 2023 19:54) (68 - 88)  BP: 153/77 (22 Oct 2023 19:54) (123/61 - 153/77)  BP(mean): --  RR: 18 (22 Oct 2023 19:54) (18 - 18)  SpO2: 96% (22 Oct 2023 19:46) (96% - 96%)    Parameters below as of 22 Oct 2023 19:46  Patient On (Oxygen Delivery Method): room air        PHYSICAL EXAM  General: adult in NAD  HEENT: clear oropharynx, anicteric sclera, pink conjunctiva  Neck: supple  CV: normal S1/S2 with no murmur rubs or gallops  Lungs: positive air movement b/l ant lungs,clear to auscultation, no wheezes, no rales  Abdomen: soft non-tender non-distended, no hepatosplenomegaly  Ext: no clubbing cyanosis or edema  Skin: no rashes and no petechiae  Neuro: alert and oriented X 4, no focal deficits  LABS:                          10.3   9.41  )-----------( 274      ( 22 Oct 2023 07:58 )             30.6         Mean Cell Volume : 95.6 fL  Mean Cell Hemoglobin : 32.2 pg  Mean Cell Hemoglobin Concentration : 33.7 g/dL  Auto Neutrophil # : 4.90 K/uL  Auto Lymphocyte # : 2.63 K/uL  Auto Monocyte # : 1.05 K/uL  Auto Eosinophil # : 0.65 K/uL  Auto Basophil # : 0.03 K/uL  Auto Neutrophil % : 52.1 %  Auto Lymphocyte % : 27.9 %  Auto Monocyte % : 11.2 %  Auto Eosinophil % : 6.9 %  Auto Basophil % : 0.3 %    Serial CBC's  10-22 @ 07:58  Hct-30.6 / Hgb-10.3 / Plat-274 / RBC-3.20 / WBC-9.41          Serial CBC's  10-21 @ 06:50  Hct-28.9 / Hgb-9.7 / Plat-270 / RBC-3.06 / WBC-8.01          Serial CBC's  10-20 @ 06:55  Hct-28.7 / Hgb-9.4 / Plat-256 / RBC-3.01 / WBC-7.09          Serial CBC's  10-19 @ 21:42  Hct-26.9 / Hgb-9.0 / Plat-248 / RBC-2.83 / WBC-8.43          Serial CBC's  10-19 @ 07:10  Hct-27.2 / Hgb-9.2 / Plat-237 / RBC-2.84 / WBC-8.06            10-22    136  |  101  |  20  ----------------------------<  80  4.4   |  25  |  2.1<H>    Ca    10.4      22 Oct 2023 07:58  Phos  3.7     10-22  Mg     2.0     10-22    TPro  6.5  /  Alb  3.4<L>  /  TBili  0.2  /  DBili  x   /  AST  23  /  ALT  15  /  AlkPhos  54  10-22          WBC Count: 9.41 K/uL (10-22-23 @ 07:58)  Hemoglobin: 10.3 g/dL (10-22-23 @ 07:58)  Hematocrit: 30.6 % (10-22-23 @ 07:58)  Platelet Count - Automated: 274 K/uL (10-22-23 @ 07:58)  WBC Count: 8.01 K/uL (10-21-23 @ 06:50)  Hemoglobin: 9.7 g/dL (10-21-23 @ 06:50)  Platelet Count - Automated: 270 K/uL (10-21-23 @ 06:50)  Hematocrit: 28.9 % (10-21-23 @ 06:50)  Vitamin B12, Serum: 1058 pg/mL (10-20-23 @ 06:55)  Folate, Serum: 3.3 ng/mL (10-20-23 @ 06:55)  WBC Count: 7.09 K/uL (10-20-23 @ 06:55)  Hemoglobin: 9.4 g/dL (10-20-23 @ 06:55)  Hematocrit: 28.7 % (10-20-23 @ 06:55)  Platelet Count - Automated: 256 K/uL (10-20-23 @ 06:55)  WBC Count: 8.43 K/uL (10-19-23 @ 21:42)  Hemoglobin: 9.0 g/dL (10-19-23 @ 21:42)  Hematocrit: 26.9 % (10-19-23 @ 21:42)  Platelet Count - Automated: 248 K/uL (10-19-23 @ 21:42)  WBC Count: 8.06 K/uL (10-19-23 @ 07:10)  Hemoglobin: 9.2 g/dL (10-19-23 @ 07:10)  Hematocrit: 27.2 % (10-19-23 @ 07:10)  Platelet Count - Automated: 237 K/uL (10-19-23 @ 07:10)  Platelet Count - Automated: 230 K/uL (10-18-23 @ 06:51)  WBC Count: 9.34 K/uL (10-18-23 @ 06:51)  Hemoglobin: 9.1 g/dL (10-18-23 @ 06:51)  Hematocrit: 27.8 % (10-18-23 @ 06:51)  WBC Count: 10.86 K/uL (10-17-23 @ 07:20)  Hemoglobin: 10.2 g/dL (10-17-23 @ 07:20)  Hematocrit: 31.2 % (10-17-23 @ 07:20)  Platelet Count - Automated: 242 K/uL (10-17-23 @ 07:20)  WBC Count: 11.97 K/uL (10-16-23 @ 15:43)  Hemoglobin: 10.6 g/dL (10-16-23 @ 15:43)  Hematocrit: 31.6 % (10-16-23 @ 15:43)  Platelet Count - Automated: 266 K/uL (10-16-23 @ 15:43)      SONNY Kappa: 0.36 mg/dL (10-19 @ 07:10)  SONNY Lambda: 779.62 mg/dL (10-19 @ 07:10)            BLOOD SMEAR INTERPRETATION:       RADIOLOGY & ADDITIONAL STUDIES:

## 2023-10-26 NOTE — PROGRESS NOTE ADULT - ASSESSMENT
57 y/o man with PMH of multiple myeloma diagnosed in 2017 and not on any chemotherapy or radiation and CKD 3B presented with Left flank pain. He was found to have MAC on CKD and elevated troponins and proBNP.     # dysphagia to pills and solid food  speech therapy consulted- VFss done, rcommending minced and moist    # CKD3B likely due to MM ( baseline 1.9-2.1)- at baseline  # Hypercalcemia - at 12 today, patient is getting xgeva  # hypomagnesemia- replaced and improved    # Multiple myeloma stopped treatment >1.5 years  oncology evaluated  willing to restart Rx now  - will follow up with heme onc as outpatient    # acute on chronic- normocytic- possibly related to MM  # folate deficiency  nl b12  folate- low- on supplement    # Lower abdominal and low back spasms due to lytic lesion from MM and hypercalcemia   continue PRN pain medication  continue robaxin- currently TID  PT following    # Dyspnea on exertion- stable  echo EF 60% no ventricular or valve dysfunction.   trops mild stable elevation ( due to CKD-)ekg no ischemic event    # Severe Malnourished with dysphagia     # B/l buccal nodular swelling-  poor dental hygiene  s/p dental eval- recommended ENT eval- team d/w patient for OP follow up                              # DVT prophylaxis: Heparin  Plan of care d/w patient

## 2023-10-26 NOTE — PROGRESS NOTE ADULT - REASON FOR ADMISSION
Left flank pain

## 2023-10-26 NOTE — PROGRESS NOTE ADULT - PROVIDER SPECIALTY LIST ADULT
Internal Medicine
Heme/Onc
Internal Medicine
Nephrology
Nephrology
Hospitalist
Internal Medicine
Nephrology
Heme/Onc
Hospitalist
Hospitalist
Internal Medicine
Internal Medicine
Nephrology
Internal Medicine
Internal Medicine

## 2023-11-10 NOTE — PRE-ANESTHESIA EVALUATION ADULT - NSANTHAIRWAYFT_ENT_ALL_CORE
PT has hypertrophic appearing jaw structure and class IV airway.  ENT came to evaluate airway.  cords are visualized when scope passes posterior tongue.

## 2023-11-10 NOTE — ASU PREOP CHECKLIST - VIA
"SUBJECTIVE:   Pamela is a 69 year old who presents for Preventive Visit.       No data to display                Are you in the first 12 months of your Medicare coverage?  No    Healthy Habits:     In general, how would you rate your overall health?  Good    Frequency of exercise:  6-7 days/week    Duration of exercise:  30-45 minutes    Do you usually eat at least 4 servings of fruit and vegetables a day, include whole grains    & fiber and avoid regularly eating high fat or \"junk\" foods?  No    Taking medications regularly:  Yes    Barriers to taking medications:  None    Medication side effects:  None    Ability to successfully perform activities of daily living:  No assistance needed    Home Safety:  No safety concerns identified    Hearing Impairment:  Difficulty following a conversation in a noisy restaurant or crowded room and difficulty understanding soft or whispered speech    In the past 6 months, have you been bothered by leaking of urine?  No    In general, how would you rate your overall mental or emotional health?  Good    Additional concerns today:  No      Today's PHQ-9 Score:       9/25/2023    11:02 AM   PHQ-9 SCORE   PHQ-9 Total Score MyChart 2 (Minimal depression)   PHQ-9 Total Score 2           Have you ever done Advance Care Planning? (For example, a Health Directive, POLST, or a discussion with a medical provider or your loved ones about your wishes): Yes, patient states has an Advance Care Planning document and will bring a copy to the clinic.      Right Ear:      1000 Hz RESPONSE- on Level: 40 db (Conditioning sound)   1000 Hz: RESPONSE- on Level: tone not heard   2000 Hz: RESPONSE- on Level: tone not heard   4000 Hz: RESPONSE- on Level: tone not heard    Left Ear:      4000 Hz: RESPONSE- on Level: tone not heard   2000 Hz: RESPONSE- on Level: 40 db   1000 Hz: RESPONSE- on Level: 40 db      Hearing Acuity: REFER    Hearing Assessment: normal   Fall risk  Fallen 2 or more times in the past " year?: No  Any fall with injury in the past year?: No    Cognitive Screening   1) Repeat 3 items (Leader, Season, Table)    2) Clock draw: NORMAL  3) 3 item recall: Recalls 3 objects  Results: 3 items recalled: COGNITIVE IMPAIRMENT LESS LIKELY    Mini-CogTM Copyright LUIS ALBERTO Person. Licensed by the author for use in [facility]; reprinted with permission (josey@[facility]). All rights reserved.      Do you have sleep apnea, excessive snoring or daytime drowsiness? : no    Reviewed and updated as needed this visit by clinical staff   Tobacco  Allergies  Meds              Reviewed and updated as needed this visit by Provider                 Social History     Tobacco Use    Smoking status: Former     Packs/day: 1.00     Years: 40.00     Pack years: 40.00     Types: Cigarettes     Quit date: 2013     Years since quitting: 10.7    Smokeless tobacco: Never   Substance Use Topics    Alcohol use: Not Currently             9/25/2023    11:08 AM   Alcohol Use   Prescreen: >3 drinks/day or >7 drinks/week? No     Do you have a current opioid prescription? No  Do you use any other controlled substances or medications that are not prescribed by a provider? None              Current providers sharing in care for this patient include:   Patient Care Team:  Mark Barrera MD as PCP - General (Internal Medicine)  Mark Barrera MD as Assigned PCP  Isac Coles MD as Assigned OBGYN Provider  Sergio Nowak MD as Assigned Surgical Provider    The following health maintenance items are reviewed in Epic and correct as of today:  Health Maintenance   Topic Date Due    DEPRESSION ACTION PLAN  Never done    MAMMO SCREENING  Never done    COLORECTAL CANCER SCREENING  Never done    LUNG CANCER SCREENING  Never done    COVID-19 Vaccine (5 - Pfizer series) 06/09/2022    PHQ-9  03/25/2024    MEDICARE ANNUAL WELLNESS VISIT  09/25/2024    ANNUAL REVIEW OF HM ORDERS  09/25/2024    FALL RISK ASSESSMENT  09/25/2024    LIPID   "07/13/2026    ADVANCE CARE PLANNING  09/25/2028    DTAP/TDAP/TD IMMUNIZATION (3 - Td or Tdap) 10/01/2028    DEXA  07/16/2035    SPIROMETRY  Completed    HEPATITIS C SCREENING  Completed    COPD ACTION PLAN  Completed    INFLUENZA VACCINE  Completed    Pneumococcal Vaccine: 65+ Years  Completed    ZOSTER IMMUNIZATION  Completed    IPV IMMUNIZATION  Aged Out    HPV IMMUNIZATION  Aged Out    MENINGITIS IMMUNIZATION  Aged Out     Lab work is in process  Labs reviewed in EPIC          Review of Systems   Constitutional:  Negative for chills and fever.   HENT:  Positive for congestion and hearing loss. Negative for ear pain and sore throat.    Eyes:  Negative for pain and visual disturbance.   Respiratory:  Positive for cough and shortness of breath.    Cardiovascular:  Negative for chest pain, palpitations and peripheral edema.   Gastrointestinal:  Negative for abdominal pain, constipation, diarrhea, heartburn, hematochezia and nausea.   Breasts:  Negative for tenderness, breast mass and discharge.   Genitourinary:  Negative for dysuria, frequency, genital sores, hematuria, pelvic pain, urgency, vaginal bleeding and vaginal discharge.   Musculoskeletal:  Positive for joint swelling. Negative for arthralgias and myalgias.   Skin:  Negative for rash.   Neurological:  Negative for dizziness, weakness, headaches and paresthesias.   Psychiatric/Behavioral:  Negative for mood changes. The patient is not nervous/anxious.      Patient quit smoking over 10 years ago.  Notes some cough and shortness of breath in the mornings but then is better.  Walks her dog 3 times a day.  Some lower extremity joint complaints.  Chronic congestion.  Notes poor hearing.    OBJECTIVE:   /74   Pulse 72   Temp (!) 96.7  F (35.9  C) (Tympanic)   Resp 20   Ht 1.6 m (5' 3\")   Wt 82.1 kg (181 lb)   LMP  (LMP Unknown)   SpO2 97%   BMI 32.06 kg/m   Estimated body mass index is 32.06 kg/m  as calculated from the following:    Height as of " "this encounter: 1.6 m (5' 3\").    Weight as of this encounter: 82.1 kg (181 lb).  Physical Exam  Overweight but otherwise healthy-appearing older woman in no distress  Eyes appear normal  HEENT exam reveals clear TMs and canals bilaterally  Neck supple no adenopathy thyromegaly  Lungs clear  Cardiac exam regular, 2/6 aortic stenosis murmur in the right upper sternal border without radiation to the carotids or axilla, no edema, normal carotid pulsations  Abdomen soft and nontender  No hot joints  Alert, oriented, speech fluent, memory good, no facial asymmetry, EOMI, normal strength and gait  Normal mood and affect    Diagnostic Test Results:  Labs reviewed in Epic    ASSESSMENT / PLAN:     Problem List Items Addressed This Visit       Moderate COPD (chronic obstructive pulmonary disease) (H)     2014 FEV1 FVC 55%, FEV1 1.32 L or 56% of predicted  Notes cough and dyspnea particularly in the mornings but declines additional therapy  Walks her dog 3 times a day           Relevant Medications    fluticasone (FLONASE) 50 MCG/ACT nasal spray    Chronic rhinitis    Relevant Medications    fluticasone (FLONASE) 50 MCG/ACT nasal spray    Mixed hyperlipidemia     Controlled with simvastatin 40 mg daily         Relevant Medications    simvastatin (ZOCOR) 40 MG tablet    Other Relevant Orders    Lipid panel reflex to direct LDL Fasting    Mild major depression (H)     Controlled with fluoxetine 10 mg daily         Relevant Medications    FLUoxetine (PROZAC) 10 MG capsule    Class 1 obesity due to excess calories with serious comorbidity and body mass index (BMI) of 30.0 to 30.9 in adult    Primary hypertension     Controlled with triamterene 37.5/hydrochlorothiazide 25         Relevant Medications    triamterene-HCTZ (MAXZIDE-25) 37.5-25 MG tablet    Other Relevant Orders    Basic metabolic panel  (Ca, Cl, CO2, Creat, Gluc, K, Na, BUN)     Other Visit Diagnoses       Encounter for Medicare annual wellness exam    -  Primary    " "Visit for screening mammogram        Screen for colon cancer        Declines screening    Personal history of tobacco use        Relevant Orders    Prof fee: Shared Decision Making for Lung Cancer Screening (Completed)    CT Chest Lung Cancer Scrn Low Dose wo    Nonrheumatic aortic valve stenosis        Relevant Orders    Echocardiogram Complete    Bilateral sensorineural hearing loss        Relevant Orders    Adult Audiology  Referral                  COUNSELING:  Reviewed preventive health counseling, as reflected in patient instructions  Special attention given to:       Regular exercise       Healthy diet/nutrition       Immunizations  Vaccinated for: Influenza           Consider lung cancer screening for ages 55-80 years (77 for Medicare) and 20 pack-year smoking history         Colon cancer screening      BMI:   Estimated body mass index is 32.06 kg/m  as calculated from the following:    Height as of this encounter: 1.6 m (5' 3\").    Weight as of this encounter: 82.1 kg (181 lb).   Weight management plan: Discussed healthy diet and exercise guidelines      She reports that she quit smoking about 10 years ago. Her smoking use included cigarettes. She has a 40.00 pack-year smoking history. She has never used smokeless tobacco.      Appropriate preventive services were discussed with this patient, including applicable screening as appropriate for cardiovascular disease, diabetes, osteopenia/osteoporosis, and glaucoma.  As appropriate for age/gender, discussed screening for colorectal cancer, prostate cancer, breast cancer, and cervical cancer. Checklist reviewing preventive services available has been given to the patient.    Reviewed patients plan of care and provided an AVS. The Basic Care Plan (routine screening as documented in Health Maintenance) for Pamela meets the Care Plan requirement. This Care Plan has been established and reviewed with the Patient.          Mark Barrera MD  [facility] " [facility]    Identified Health Risks:  Answers submitted by the patient for this visit:  Patient Health Questionnaire (Submitted on 9/25/2023)  If you checked off any problems, how difficult have these problems made it for you to do your work, take care of things at home, or get along with other people?: Not difficult at all  PHQ9 TOTAL SCORE: 2     stretcher

## 2023-11-10 NOTE — ASU PATIENT PROFILE, ADULT - NSICDXPASTMEDICALHX_GEN_ALL_CORE_FT
PAST MEDICAL HISTORY:  Chronic kidney disease, unspecified CKD stage     Multiple myeloma, remission status unspecified      PAST MEDICAL HISTORY:  Chronic kidney disease, unspecified CKD stage     Folate deficiency     History of dysphagia     Multiple myeloma, remission status unspecified     Muscle spasm of back

## 2023-11-10 NOTE — H&P ADULT - NSHPPHYSICALEXAM_GEN_ALL_CORE
T(C): 37 (11-10-23 @ 13:32), Max: 37 (11-10-23 @ 13:32)  HR: 96 (11-10-23 @ 13:47) (90 - 110)  BP: 111/60 (11-10-23 @ 13:47) (105/60 - 111/60)  RR: 18 (11-10-23 @ 13:47) (16 - 18)  SpO2: 98% (11-10-23 @ 13:47) (97% - 98%)    CONSTITUTIONAL: NAD, cachectic , thin and frail   HEENT: AT, NC, neck supple  RESP: No respiratory distress, no use of accessory muscles, course breath sounds at bases  CV: RRR, +S1S2, no peripheral edema  GI: Abd binder in place, non-distended  MSK: Able to move all extremities (slowly)  SKIN: Non-blanching pressure ulcer mid thoracic spine  NEURO: Sensation intact in upper and lower extremities b/l to light touch   PSYCH: AAOx3; drowsy as pt is s/p anesthesia

## 2023-11-10 NOTE — ASU PATIENT PROFILE, ADULT - LOCATION
[facility]  Progress Note    Woody Liang  MRN: [id number]  Date: 7/31/2020    Reason for F/U :   Diabetic foot infection    Impression :   1. Peripheral arterial disease status post left superficial femoral, popliteal, tibial, peroneal trunk and posterior tibial artery atherectomy/balloon angioplasty 7/29/2020  2. Left foot plantar ulcers, great toe gangrene with associated cellulitis of the foot  3. Diabetes mellitus with associated neuropathy  4. Esophageal cancer    Recommendations:   · The patient continues on intravenous antimicrobial therapy with Unasyn and vancomycin  · The patient is now agreeable to amputation of the great  · We will follow his clinical progress and adjust therapy accordingly    Infection Control Recommendations:   Universal precautions    Discharge Planning:   Estimated Length of IV antimicrobials: To be determined  Patient will need Midline Catheter Insertion/ PICC line Insertion: No  Patient will need: Home IV , [place],  SNF,  LTAC: Undetermined  Patient willneed outpatient wound care: No    MedicalDecision making / Summary of Stay:   Woody Liang is a 57y.o.-year-old male present to the hospital with left great toe black discoloration associated with bloody drainage and pain worsening over several days. He had left great toe wound for several weeks. Initial labs showed blood glucose 331, CRP 62.4, sed rate 91, WBC 12.5. History of peripheral vascular disease status post right below-knee amputation.     X-ray left foot indicates soft tissue swelling of the first ray with some associated punctate radiopaque foreign bodies.  Reabsorption and/or amputation of the fifth ray, some associated soft tissue changes.   He was seen at the ER 7/24/2020 with alcohol intoxication, previously was seen on 7/12/2020 with hyperglycemia, hemoglobin A1c was 13, had reportedly a left great toe ulcer with mild erythema was treated with doxycycline at that time.  History of MRSA growth from left wound culture in 2018. Current evaluation:2020    BP (!) 143/73   Pulse 101   Temp 97.7 °F (36.5 °C) (Oral)   Resp 16   Ht 6' 1\" (1.854 m)   Wt 166 lb 3.2 oz (75.4 kg)   SpO2 94%   BMI 21.93 kg/m²     Temperature Range: Temp: 97.7 °F (36.5 °C) Temp  Av.6 °F (36.4 °C)  Min: 97.3 °F (36.3 °C)  Max: 97.9 °F (36.6 °C)  The patient is seen and evaluated at bedside he is awake and alert in no acute distress. No subjective fever or chills. No cough or shortness of breath. No abdominal pain nausea vomiting diarrhea constipation. Review of Systems   Constitutional: Negative. Respiratory: Negative. Cardiovascular: Negative. Gastrointestinal: Negative. Genitourinary: Negative. Musculoskeletal: Negative. Skin: Positive for wound. Allergic/Immunologic: Negative. Neurological: Negative. Physical Examination :     Physical Exam  HENT:      Head: Normocephalic and atraumatic. Eyes:      General: No scleral icterus. Pupils: Pupils are equal, round, and reactive to light. Neck:      Musculoskeletal: Normal range of motion and neck supple. Cardiovascular:      Rate and Rhythm: Normal rate and regular rhythm. Heart sounds: Normal heart sounds. No murmur. Pulmonary:      Effort: Pulmonary effort is normal.      Breath sounds: Normal breath sounds. Abdominal:      General: Bowel sounds are normal.      Palpations: Abdomen is soft. There is no mass. Tenderness: There is no abdominal tenderness. Musculoskeletal:      Comments: Right below the knee amputation   Lymphadenopathy:      Cervical: No cervical adenopathy. Skin:     General: Skin is warm and dry. Findings: No rash. Comments: The left foot is in a dressing which was not removed   Neurological:      Mental Status: He is alert and oriented to person, place, and time.          Laboratory data:   I have independently reviewed the followinglabs:  CBC with Differential:   Recent Labs     07/30/20  0526 07/31/20  0521   WBC 10.8 12.9*   HGB 10.1* 10.7*   HCT 33.4* 35.5*    456*   LYMPHOPCT 17* 17*   MONOPCT 11 10     BMP:   Recent Labs     07/30/20  0526 07/31/20  0521    142   K 3.9 4.3    105   CO2 27 29   BUN 16 16   CREATININE 0.74 0.76     Hepatic Function Panel: No results for input(s): PROT, LABALBU, BILIDIR, IBILI, BILITOT, ALKPHOS, ALT, AST in the last 72 hours. Recent Labs     07/29/20  0552   VANCOTROUGH 17.2      Lab Results   Component Value Date    CRP 64.5 (H) 07/31/2020     Lab Results   Component Value Date    SEDRATE 91 (H) 07/27/2020       No results for input(s): PROCAL in the last 72 hours. Imaging Studies:   No new imaging    Cultures:     MRSA DNA Probe, Nasal [[id number]]   Collected: 07/29/20 1933    Order Status: Completed  Specimen: Nasal  Updated: 07/31/20 1422     Specimen Description  . NASAL SWAB     MRSA, DNA, Nasal  NEGATIVE:  MRSA DNA not detected by nucleic acid amplification. Comment:                                                     Results should be used as an adjunct to nosocomial control efforts to identify patients   needing enhanced precautions.     The test is not intended to identify patients with staphylococcal infections.  Results   should not be used to guide or monitor treatment for MRSA infections. Culture, Blood 1 [[id number]]   Collected: 07/28/20 0902    Order Status: Completed  Specimen: Blood  Updated: 07/31/20 0006     Specimen Description  . BLOOD     Special Requests  NOT REPORTED     Culture  NO GROWTH 3 DAYS    MRSA DNA Probe, Nasal [[id number]]   Collected: 07/28/20 1907    Order Status: Completed  Specimen: Nasal  Updated: 07/29/20 1100     Specimen Description  . NASAL SWAB     MRSA, DNA, Nasal  NEGATIVE:  MRSA DNA not detected by nucleic acid amplification.      Comment:                                                     Results should be used as an adjunct to nosocomial control efforts to identify patients   needing enhanced precautions.     The test is not intended to identify patients with staphylococcal infections.  Results   should not be used to guide or monitor treatment for MRSA infections. Medications:      guaiFENesin  600 mg Oral BID    insulin glargine  70 Units Subcutaneous Nightly    ampicillin-sulbactam  3 g Intravenous Q6H    clopidogrel  75 mg Oral Daily    pantoprazole  40 mg Oral QAM AC    sodium chloride flush  10 mL Intravenous 2 times per day    atorvastatin  40 mg Oral Nightly    metFORMIN  500 mg Oral BID WC    aspirin  81 mg Oral Daily    apixaban  5 mg Oral BID    insulin lispro  0-18 Units Subcutaneous TID WC    insulin lispro  0-9 Units Subcutaneous Nightly    vancomycin  1,250 mg Intravenous Q12H    vancomycin (VANCOCIN) intermittent dosing (placeholder)   Other RX Placeholder    fluticasone  2 spray Each Nostril Daily           [facility]  Estephania Kasper MD  [employer] messaging  OFFICE: [phone]      Electronically signed by Estephania Kasper MD on 7/31/2020 at 6:17 PM  Thank you for allowing us to participate in the care of this patient. Please call with questions. This note iscreated with the assistance of a speech recognition program.  While intending to generate a document that actually reflects the content of the visit, the document can still have some errors including those of syntax andsound a like substitutions which may escape proof reading. In such instances, actual meaning can be extrapolated by contextual diversion. thoracic spine

## 2023-11-10 NOTE — CONSULT NOTE ADULT - SUBJECTIVE AND OBJECTIVE BOX
Gastroenterology Consultation:    Patient is a 58y old  Male who presents with a chief complaint of dysphagia       Admitted on: 11-10-23      HPI:   58-year-old male past medical history of multiple myeloma diagnosed in 2017 not on any chemotherapy or radiation presented to [facility] for outpatient EGD and PEG placement for failure to thrive being admitted for post procedure monitoring. s/p EGD with PEG placement (as below). recently admitted for flank pain, electrolytes abnormalities and dysphagia. no complains today.            PAST MEDICAL & SURGICAL HISTORY:  Multiple myeloma, remission status unspecified      Chronic kidney disease, unspecified CKD stage      Folate deficiency      Muscle spasm of back      History of dysphagia      No significant past surgical history            FAMILY HISTORY:  negative for gi malignancy     Social History:  Tobacco: denies   Alcohol: denies  Drugs: denies    Home Medications:  acetaminophen 325 mg oral capsule: 1 cap(s) orally as needed for  mild pain (10 Nov 2023 11:59)  Colace 100 mg oral capsule: 1 cap(s) orally 2 times a day (10 Nov 2023 12:00)  fentaNYL 25 mcg/hr transdermal film, extended release: 1 patch transdermally 3 times a day (10 Nov 2023 11:35)  folic acid 1 mg oral tablet: 1 tab(s) orally once a day (10 Nov 2023 11:57)  methocarbamol 500 mg oral tablet: 2 tab(s) orally 3 times a day (10 Nov 2023 11:58)  oxyCODONE 5 mg oral tablet: 1 tab(s) orally every 6 hours as needed for  moderate pain (10 Nov 2023 12:00)        MEDICATIONS  (STANDING):    MEDICATIONS  (PRN):      Allergies  No Known Allergies      Review of Systems:   Constitutional:  No Fever, No Chills  ENT/Mouth:  No Hearing Changes,  No Difficulty Swallowing  Eyes:  No Eye Pain, No Vision Changes  Cardiovascular:  No Chest Pain, No Palpitations  Respiratory:  No Cough, No Dyspnea  Gastrointestinal:  As described in HPI  Musculoskeletal:  No Joint Swelling, No Back Pain  Skin:  No Skin Lesions, No Jaundice  Neuro:  No Syncope, No Dizziness  Heme/Lymph:  No Bruising, No Bleeding.          Physical Examination:  T(C): 36.8 (11-10-23 @ 12:27), Max: 36.8 (11-10-23 @ 11:38)  HR: 101 (11-10-23 @ 12:27) (101 - 101)  BP: 109/78 (11-10-23 @ 12:27) (109/78 - 109/78)  RR: 16 (11-10-23 @ 12:27) (16 - 16)  SpO2: --  Height (cm): 180.3 (11-10-23 @ 12:27)  Weight (kg): 45.4 (11-10-23 @ 12:27)        GENERAL: AAOx3, no acute distress. cachectic   HEAD:  Atraumatic, Normocephalic  EYES: conjunctiva and sclera clear  NECK: Supple, no JVD or thyromegaly  CHEST/LUNG: Clear to auscultation bilaterally; No wheeze, rhonchi, or rales  HEART: Regular rate and rhythm; normal S1, S2, No murmurs.  ABDOMEN: Soft, nontender, nondistended; Bowel sounds present  NEUROLOGY: No asterixis or tremor.   SKIN: Intact, no jaundice

## 2023-11-10 NOTE — CONSULT NOTE ADULT - SUBJECTIVE AND OBJECTIVE BOX
Pt is a 59yo Male who presents for an elective G tube - called by anesthesia to assess laryngeal airway prior to procedure. Pt has h/o MM, enlarged protruding lower jaw with recently enlarged tongue. Anesthesia requesting FFL prior to EGD to determine airway safety/plan. Pt denies any SOB/diff breathing.     PAST MEDICAL & SURGICAL HISTORY:  Multiple myeloma, remission status unspecified  Chronic kidney disease, unspecified CKD stage  Folate deficiency  Muscle spasm of back  History of dysphagia  No significant past surgical history    MEDICATIONS  (STANDING):    MEDICATIONS  (PRN):    Allergies  No Known Allergies  Intolerances        REVIEW OF SYSTEMS   [x] A ten-point review of systems was otherwise negative except as noted.    Vital Signs Last 24 Hrs  T(C): 36.8 (10 Nov 2023 12:27), Max: 36.8 (10 Nov 2023 11:38)  T(F): 98.3 (10 Nov 2023 11:38), Max: 98.3 (10 Nov 2023 11:38)  HR: 101 (10 Nov 2023 12:27) (101 - 101)  BP: 109/78 (10 Nov 2023 12:27) (109/78 - 109/78)  RR: 16 (10 Nov 2023 12:27) (16 - 16)    GEN: NAD, awake and alert. No drooling or pooling of secretions. No stridor or stertor. Good vocal quality, no hoarseness. Ill appearing.   SKIN: Pale, non diaphoretic.  HEENT: + protruding/enlarged mandible. Oral mucosa pink and moist. + enlarged tongue. No erythema or edema noted to buccal mucosa, FOM, uvula or posterior oropharynx. Uvula midline.   NECK: Trachea midline, Neck supple, no TTP to B/L lateral neck, no cervical LAD.  RESP: No dyspnea, non-labored breathing. No use of accessory muscles.   CARDIO: +S1/S2  ABDO: Soft, NT.  EXT: ZARATE x 4    Fiberoptic Laryngoscopy: No masses or lesions noted to NP/OP/HP. Laryngeal structures intact, laryngeal airway narrow, + secretions noted. no edema or erythema noted. Epiglottis crisp, no edema. TVC/FVC mobile and intact, no glottic gap noted.

## 2023-11-10 NOTE — H&P PST ADULT - NSICDXPASTMEDICALHX_GEN_ALL_CORE_FT
PAST MEDICAL HISTORY:  Chronic kidney disease, unspecified CKD stage     Multiple myeloma, remission status unspecified

## 2023-11-10 NOTE — CHART NOTE - NSCHARTNOTEFT_GEN_A_CORE
PACU ANESTHESIA ADMISSION NOTE      Procedure:   Post op diagnosis:      ____  Intubated  TV:______       Rate: ______      FiO2: ______    _x___  Patent Airway    _x___  Full return of protective reflexes    _x___  Full recovery from anesthesia / back to baseline status    Vitals  SPO2:-100  HR:-107  RR:-11  B.P:-98/57  TEMP:-97.8    Mental Status:  _x___ Awake   ___x_ Alert   _____ Drowsy   _____ Sedated    Nausea/Vomiting:  _x___  NO       ______Yes,   See Post - Op Orders         Pain Scale (0-10):  __0___    Treatment: _x___ None    __x__ See Post - Op/PCA Orders    Post - Operative Fluids:   ___ Oral   ____x See Post - Op Orders    Plan: Discharge:   ____Home       ___x__Floor     _____Critical Care    _____  Other:_________________    Comments:  Report endorsed to RN in pacu  Vitals stable  No anesthesia issues or complications noted.  Discharge to patient to floor when criteria met.

## 2023-11-10 NOTE — PRE-ANESTHESIA EVALUATION ADULT - NSANTHADDINFOFT_GEN_ALL_CORE
Discussed risks and benefits of anesthesia including but not limited to the risk of sore throat, N/V, damage to mouth, teeth and lips, stroke, MI and even death.  Patient demonstrates understanding, all questions answered. The patient wishes to proceed with planned treatment.     I explained in full detail to the pt and his sister our concerns with the airway and they agreed to proceed with the procedure.

## 2023-11-10 NOTE — H&P ADULT - ATTENDING COMMENTS
58-year-old male PMHx of multiple myeloma diagnosed in 2017 not on any chemotherapy or radiation follows with Dr. Valadez. Pt presented to [facility] for  EGD and PEG placement for failure to thrive being admitted for post procedure monitoring.  Patient was recently admitted for back pain due to lytic lesions and found to have progression of multiple myeloma, course complicated by hypercalcemia.  Today he is drowsy post procedure but not endorsing any pain.    General: WN/WD NAD  Neurology: A&Ox3, nonfocal, ZARATE x 4  Head:  Normocephalic, atraumatic  ENT:  Mucosa moist, no ulcerations  Neck:  Supple, no sinuses or palpable masses  Lymphatic:  No palpable cervical, supraclavicular, axillary or inguinal adenopathy  Respiratory: CTA B/L  CV: RRR, S1S2, no murmur  Abdominal: Soft, NT, ND no palpable mass, in binder  MSK: No edema, + peripheral pulses, FROM all 4 extremity    Assessment    Admitted for observation post PEG placement  Malnourished likely secondary to dysphagia in the setting of MM progression  CKD3b likely from MM progression  Hx of hypercalcemia  Hx of back pain    Plan    - meds and feeding as per GI recs, hold meds for 4 hours and feeding tomorrow after GI clears, c/w D5 1/2 NS in the meantime  - nutrition support consult for recommendations now and follow up as outpatient, patient is cachectic, monitor electrolytes for refeeding syndrome, f/u phos  - f/u creatinine and calcium, needed xgeva last admission  - c/w home pain meds monitor for lethargy    Pending: tolerance of meds and feeds tomorrow through PEG tube, nutrition support eval, monitor for refeeding syndrome    # DVT PPX: heparin subq

## 2023-11-10 NOTE — H&P ADULT - HISTORY OF PRESENT ILLNESS
Pt is a 58-year-old male PMHx of multiple myeloma diagnosed in 2017 not on any chemotherapy or radiation follows with Dr. Valadez. Pt presented to [facility] for  EGD and PEG placement for failure to thrive being admitted for post procedure monitoring. Of note, pt recently admitted 10/2023 for flank pain, electrolytes abnormalities and dysphagia.     On admission vitals: /78, , RR 16, T 98.3F    Pt was drowsy on my exam. Information obtained from sister Madisyn Gonzalez [phone] at bedside and [facility].

## 2023-11-10 NOTE — H&P ADULT - NSICDXPASTMEDICALHX_GEN_ALL_CORE_FT
PAST MEDICAL HISTORY:  Chronic kidney disease, unspecified CKD stage     Folate deficiency     History of dysphagia     Multiple myeloma, remission status unspecified     Muscle spasm of back

## 2023-11-10 NOTE — ASU PATIENT PROFILE, ADULT - NS PRO PASSIVE SMOKE EXP

## 2023-11-10 NOTE — ASU PATIENT PROFILE, ADULT - FALL HARM RISK - HARM RISK INTERVENTIONS

## 2023-11-10 NOTE — ASU PATIENT PROFILE, ADULT - BILL OF RIGHTS/ADMISSION INFORMATION PROVIDED TO:
Indication:



Abdominal pain history of obstruction



Technique:



Volumetric multidetector CT images of the abdomen and pelvis were without 

the administration of intravenous contrast.



Comparison:



CT June 23, 2020



Findings:



There is minimal bibasilar atelectasis versus scar.



The liver is normal in attenuation without intrahepatic biliary ductal 

dilatation.



There is prior cholecystectomy.



There is reservoir dilatation of the intrahepatic and common bile duct.



The spleen is normal in attenuation and size.



There is a somewhat bulky appearing questionable mass arising from the 

gastroesophageal junction versus is decompressed stomach. Evaluation of 

this is limited due to lack of oral or IV contrast.



There is marked pancreatic atrophy.



The adrenal glands are unremarkable.



There is hypertrophic change of the right kidney and atrophy of the left 

kidney similar to previous exam.



There is a moderate amount of stool seen throughout the colon with 

moderately dilated fluid-filled loops of central small bowel likely 

representing developing obstruction versus ileus.



The appendix is not visualized.



There is no significant mesenteric, retroperitoneal, or pelvic sidewall 

lymph nodes.



The aorta is non aneurysmal with scattered atherosclerotic calcifications 

similar to previous exam.



There is marked prostatomegaly and markedly distended appearing bladder 

with peripheral bladder diverticula similar to previous exam.



There is no free fluid or free air.



There is mild diastasis of the rectus musculature. Postoperative change of 

the right groin status post inguinal hernia repair is again seen.



The lumbar vertebral body heights are grossly preserved with 

mild-to-moderate degenerative disc disease.



Impression:



Demonstration of multiple dilated gas and fluid-filled loops of central 

small bowel which could represent developing obstruction.



Incidental note of somewhat bulky soft tissue at the gastroesophageal 

junction which could represent a decompressed stomach, an underlying mass 

within the gastric lumen is not excluded and further evaluation with oral 

and IV contrast exam is recommended for improved characterization.



Please note that all CT scans at this facility use dose modulation, 

iterative reconstruction, and/or weight-based dosing when appropriate to 

reduce radiation dose to as low as reasonably achievable.



Dictated by Michael Choudhary MD @ Apr 8 2021  8:26AM



Signed by Dr. Michael Choudhary @ Apr 8 2021  8:47AM Patient Representative

## 2023-11-10 NOTE — H&P ADULT - ASSESSMENT
Pt is a 58-year-old male PMHx of multiple myeloma diagnosed in 2017 not on any chemotherapy or radiation s/p EGD PEG on 11/10 with GI being admitted for  post-procedure monitoring.    #Dysphagia with failure to thrive s/p EGD with PEG placement  - s/p EGD/PEG today with GI   - EGD results: tight UES, normal esophagus, erosive gastritis, normal duodenum  - GI recs appreciated (below):   - Can use PEG for medications in 4 hours  - Can use PEG for feedings in AM after evaluation by GI fellow  - Flush before and after each use  - Keep abdominal binder at all times  - PPI twice daily  - monitor for any signs of bleeding  - nutrition eval for tube feed optimization  - IVF while NPO      #MM  - Diagnosed in 2017, follows with Dr Valadez  - Has not been on any tx for >1 yr  - Pain control (On fentanyl patch 25mcg in SICC and oxy - since pt NPO will give IV morphine PRN interim)  - Sister is hopeful pt will regain strength / nutrition after PEG placement to being tx again  - Discussed role of palliative care in this situation and pt sister stated she would like some time to think about it over the weekend      #Pressure ulcer T spine  - Pt has increased kyphosis on T spine with non-blanchable ulcer  - Wound care RN consulted        # Misc  - DVT Prophylaxis: Heparin subq  - GI Prophylaxis: pantoprazole IV BID  - Diet: NPO  - Code Status: Full Code      MED rec per Psychiatric papers

## 2023-11-11 NOTE — PROGRESS NOTE ADULT - SUBJECTIVE AND OBJECTIVE BOX
24H events:    Patient is a 58y old Male who presents with a chief complaint of Post-procedure monitoring (10 Nov 2023 13:54)    Primary diagnosis of PEG tube placement post-procedure monitoring.   Today is hospital day 1d. This morning patient was seen and examined at bedside, resting comfortably in bed.    No acute or major events overnight. Hemodynamically stable, tolerating oral diet, voiding appropriately with appropriate bowel movements.     At bedside, patient endorsed shortness of breath, spo2 was 94% on RA, mild wheezing appreciated on exam.     Code Status:    Family communication:  Contact date:  Name of person contacted:  Relationship to patient:  Communication details:  What matters most:    PAST MEDICAL & SURGICAL HISTORY  Multiple myeloma, remission status unspecified    Chronic kidney disease, unspecified CKD stage    Folate deficiency    Muscle spasm of back    History of dysphagia    No significant past surgical history      SOCIAL HISTORY:  Social History:      ALLERGIES:  No Known Allergies    MEDICATIONS:  STANDING MEDICATIONS  dextrose 5% + sodium chloride 0.9%. 1000 milliLiter(s) IV Continuous <Continuous>  fentaNYL   Patch  25 MICROgram(s)/Hr 1 Patch Transdermal every 72 hours  folic acid 1 milliGRAM(s) Oral daily  heparin   Injectable 5000 Unit(s) SubCutaneous every 12 hours  methocarbamol 500 milliGRAM(s) Oral three times a day  pantoprazole  Injectable 40 milliGRAM(s) IV Push every 12 hours  polyethylene glycol 3350 17 Gram(s) Oral daily  senna 2 Tablet(s) Oral at bedtime    PRN MEDICATIONS  acetaminophen     Tablet .. 650 milliGRAM(s) Oral every 6 hours PRN  melatonin 3 milliGRAM(s) Oral at bedtime PRN  morphine  - Injectable 4 milliGRAM(s) IV Push every 6 hours PRN  ondansetron Injectable 4 milliGRAM(s) IV Push every 8 hours PRN    VITALS:   T(F): 96.7  HR: 97  BP: 98/56  RR: 18  SpO2: 98%    PHYSICAL EXAM:    CONSTITUTIONAL: NAD, cachectic , thin and frail   HEENT: AT, NC, neck supple  RESP: No respiratory distress, no use of accessory muscles, mild wheezing b/l  CV: RRR, +S1S2, no peripheral edema  GI: Abd binder in place, non-distended  MSK: Able to move all extremities (slowly)  SKIN: Non-blanching pressure ulcer mid thoracic spine  NEURO: Sensation intact in upper and lower extremities b/l to light touch, AAOx3; no focal neurological deficits       (  ) Indwelling Michael Catheter:   Date insterted:    Reason (  ) Critical illness     (  ) urinary retention    (  ) Accurate Ins/Outs Monitoring     (  ) CMO patient    (  ) Central Line:   Date inserted:  Location: (  ) Right IJ     (  ) Left IJ     (  ) Right Fem     (  ) Left Fem    (  ) SPC        (  ) pigtail       (  ) PEG tube       (  ) colostomy       (  ) jejunostomy  (  ) U-Dall    LABS:                        10.1   12.43 )-----------( 159      ( 11 Nov 2023 05:47 )             30.5     11-11    129<L>  |  98  |  45<H>  ----------------------------<  113<H>  4.9   |  24  |  2.7<H>    Ca    6.6<L>      11 Nov 2023 05:47  Phos  3.8     11-11  Mg     2.1     11-11    TPro  6.9  /  Alb  2.7<L>  /  TBili  0.3  /  DBili  x   /  AST  25  /  ALT  15  /  AlkPhos  52  11-11    PT/INR - ( 11 Nov 2023 05:47 )   PT: 13.40 sec;   INR: 1.17 ratio         PTT - ( 11 Nov 2023 05:47 )  PTT:27.4 sec  Urinalysis Basic - ( 11 Nov 2023 05:47 )    Color: x / Appearance: x / SG: x / pH: x  Gluc: 113 mg/dL / Ketone: x  / Bili: x / Urobili: x   Blood: x / Protein: x / Nitrite: x   Leuk Esterase: x / RBC: x / WBC x   Sq Epi: x / Non Sq Epi: x / Bacteria: x                RADIOLOGY:    RADIOLOGY

## 2023-11-11 NOTE — PATIENT PROFILE ADULT - FUNCTIONAL ASSESSMENT - BASIC MOBILITY 6.
1-calculated by average/Not able to assess (calculate score using [facility] averaging method)

## 2023-11-11 NOTE — PROGRESS NOTE ADULT - ATTENDING COMMENTS
Patient seen and examined at bedside independently of medical resident and agree with the note unless otherwise stated     Vital Signs Last 24 Hrs  T(C): 35.9 (11 Nov 2023 05:19), Max: 37 (10 Nov 2023 13:32)  T(F): 96.7 (11 Nov 2023 05:19), Max: 98.6 (10 Nov 2023 13:32)  HR: 97 (11 Nov 2023 05:19) (90 - 118)  BP: 98/56 (11 Nov 2023 05:19) (98/56 - 127/72)  BP(mean): --  RR: 18 (11 Nov 2023 05:19) (16 - 18)  SpO2: 98% (10 Nov 2023 14:54) (97% - 98%)    Parameters below as of 10 Nov 2023 14:54  Patient On (Oxygen Delivery Method): room air                          10.1   12.43 )-----------( 159      ( 11 Nov 2023 05:47 )             30.5   11-11    129<L>  |  98  |  45<H>  ----------------------------<  113<H>  4.9   |  24  |  2.7<H>    Ca    6.6<L>      11 Nov 2023 05:47  Phos  3.8     11-11  Mg     2.1     11-11    TPro  6.9  /  Alb  2.7<L>  /  TBili  0.3  /  DBili  x   /  AST  25  /  ALT  15  /  AlkPhos  52  11-11    # Dysphagia   # s/p Peg tube placement   # FTT - Severe protein malnutrition/Underweight BMI < 14  # Dyspnea / suspected aspiration Pneumonitis   # MAC   # Acute Hyponatremia   # MM   # Wound/skin ulcer     -start Peg tube feeds - discussed with GI  -monitor electrolytes   -stat chest xray, leukocytosis noted; afebrile - empiric abx   -NS IVF - monitor BMP and stop fluids next 8 hrs - monitor for volume overload   -serum Cr uptrended - check urine protein Cr ratio - renal US   -clarify home meds   -skin care as per nursing - wound care nurse consult for proper assessment and diagnosis   -overall prognosis guarded - full code   -outpatient follow up with oncologist Dr. Valadez     Attending Physician Dr. Payal Lovett # [id number]

## 2023-11-11 NOTE — CHART NOTE - NSCHARTNOTEFT_GEN_A_CORE
Notified by lab of elevated troponin of 1.02  Patient was seen and had no complaints, denied chest pain and was asymptomatic.  ECG from 1830 showed ST elevation in AVR with ST depression in multiple leads.  Cardio fellow contacted. Communicated to trend troponins and serial ECGs and monitor for now.  Pending vital signs.

## 2023-11-11 NOTE — PROGRESS NOTE ADULT - ASSESSMENT
Pt is a 58-year-old male PMHx of multiple myeloma diagnosed in 2017 not on any chemotherapy or radiation s/p EGD PEG on 11/10 with GI being admitted for  post-procedure monitoring.    #Dysphagia with failure to thrive s/p EGD with PEG placement  - s/p EGD/PEG today with GI   - EGD results: tight UES, normal esophagus, erosive gastritis, normal duodenum  - GI recs appreciated (below):   - Can use PEG for medications in 4 hours  - Can use PEG for feedings in AM after evaluation by GI fellow  - Flush before and after each use  - Keep abdominal binder at all times  - PPI twice daily  - monitor for any signs of bleeding  - nutrition eval for tube feed optimization  - IVF while NPO  - phosphorus wnl today 11/11    #Hyponatremia  - 127 this am, patient was on half normal ns w/d5  - changed to normal saline for 8 hours duration, f/u 11am and 8pm bmp      #MM  - Diagnosed in 2017, follows with Dr Valadez  - Has not been on any tx for >1 yr  - Pain control (On fentanyl patch 25mcg in SICC and oxy - since pt NPO will give IV morphine PRN interim)  - Sister is hopeful pt will regain strength / nutrition after PEG placement to being tx again  - Discussed role of palliative care in this situation and pt sister stated she would like some time to think about it over the weekend      #Pressure ulcer T spine  - Pt has increased kyphosis on T spine with non-blanchable ulcer  - Wound care RN consulted        # Misc  - DVT Prophylaxis: Heparin subq  - GI Prophylaxis: pantoprazole IV BID  - Diet: npo, tube feeds to start once cleared by GI today 11/11  - Code Status: Full Code      MED rec per [facility] papers   Pt is a 58-year-old male PMHx of multiple myeloma diagnosed in 2017 not on any chemotherapy or radiation s/p EGD PEG on 11/10 with GI being admitted for  post-procedure monitoring.    #Dysphagia with failure to thrive s/p EGD with PEG placement  - s/p EGD/PEG today with GI   - EGD results: tight UES, normal esophagus, erosive gastritis, normal duodenum  - GI recs appreciated (below):   - Can use PEG for medications in 4 hours  - Can use PEG for feedings in AM after evaluation by GI fellow  - Flush before and after each use  - Keep abdominal binder at all times  - PPI twice daily  - monitor for any signs of bleeding  - nutrition eval for tube feed optimization  - IVF while NPO  - phosphorus wnl today 11/11    #Hyponatremia  - 127 this am, patient was on half normal ns w/d5  - changed to normal saline for 8 hours duration, f/u 11am and 8pm bmp    #SOB  - noted this am, satting 94% on RA  - CXR: Impression:  Bilateral opacities. Left basilar opacity/pleural effusion. Cardiomegaly.  - afebrile currently but elevated wbc w/35.9 temp  - bp 98/56 this am, hr 97, rr 18 on ra  - f/u bcx  - IV unasyn for empiric coverage      #MM  - Diagnosed in 2017, follows with Dr Valadez  - Has not been on any tx for >1 yr  - Pain control (On fentanyl patch 25mcg in SICC and oxy - since pt NPO will give IV morphine PRN interim)  - Sister is hopeful pt will regain strength / nutrition after PEG placement to being tx again  - Discussed role of palliative care in this situation and pt sister stated she would like some time to think about it over the weekend      #Pressure ulcer T spine  - Pt has increased kyphosis on T spine with non-blanchable ulcer  - Wound care RN consulted        # Misc  - DVT Prophylaxis: Heparin subq  - GI Prophylaxis: pantoprazole IV BID  - Diet: npo, tube feeds to start once cleared by GI today 11/11  - Code Status: Full Code      MED rec per [facility] papers   Pt is a 58-year-old male PMHx of multiple myeloma diagnosed in 2017 not on any chemotherapy or radiation s/p EGD PEG on 11/10 with GI being admitted for  post-procedure monitoring.    #Dysphagia with failure to thrive s/p EGD with PEG placement  - s/p EGD/PEG today with GI   - EGD results: tight UES, normal esophagus, erosive gastritis, normal duodenum  - GI recs appreciated (below):   - Can use PEG for medications in 4 hours  - Can use PEG for feedings in AM after evaluation by GI fellow  - Flush before and after each use  - Keep abdominal binder at all times  - PPI twice daily  - monitor for any signs of bleeding  - nutrition eval for tube feed optimization  - IVF while NPO  - phosphorus wnl today 11/11    #Hyponatremia  - 127 this am, patient was on half normal ns w/d5  - changed to normal saline for 8 hours duration, f/u 11am and 8pm bmp    #SOB  - noted this am, satting 94% on RA  - CXR: Impression:  Bilateral opacities. Left basilar opacity/pleural effusion. Cardiomegaly.  - afebrile currently but elevated wbc w/35.9 temp  - bp 98/56 this am, hr 97, rr 18 on ra  - f/u bcx  - IV unasyn for empiric coverage renally dosed      #MM  - Diagnosed in 2017, follows with Dr Valadez  - Has not been on any tx for >1 yr  - Pain control (On fentanyl patch 25mcg in SICC and oxy - since pt NPO will give IV morphine PRN interim)  - Sister is hopeful pt will regain strength / nutrition after PEG placement to being tx again  - Discussed role of palliative care in this situation and pt sister stated she would like some time to think about it over the weekend      #Pressure ulcer T spine  - Pt has increased kyphosis on T spine with non-blanchable ulcer  - Wound care RN consulted        # Misc  - DVT Prophylaxis: Heparin subq  - GI Prophylaxis: pantoprazole IV BID  - Diet: npo, tube feeds to start once cleared by GI today 11/11  - Code Status: Full Code      MED rec per [facility] papers

## 2023-11-11 NOTE — PROGRESS NOTE ADULT - SUBJECTIVE AND OBJECTIVE BOX
Gastroenterology Follow Up Note      Location: [facility] 4A (Back) 018 A ([facility] 4A (Back))  Patient Name: NAN GONZALEZ  Age: 58y  Gender: Male      Chief Complaint  Patient is a 58y old Male who presents with a chief complaint of Post-procedure monitoring (11 Nov 2023 10:44)  Primary diagnosis of Protein-calorie malnutrition  [place]      Reason for Consult  PEG tube placement and loosening of bumper      Progress Note  This morning patient was seen and examined at bedside.    Today is hospital day 1d.  Patient is doing fine. No acute events overnight.   He denies discomfort from G tube placement.  He also denies nausea or vomiting.  He denies constipation.      Vital Signs in the last 24 hours   Vitals Summary T(C): 36.1 (11-11-23 @ 13:27), Max: 36.2 (11-10-23 @ 20:54)  HR: 100 (11-11-23 @ 13:27) (97 - 118)  BP: 102/60 (11-11-23 @ 13:27) (98/56 - 127/72)  RR: 18 (11-11-23 @ 13:27) (16 - 18)  SpO2: 98% (11-10-23 @ 14:54) (98% - 98%)  Vent Data   Intake/ Output   Measurements       Physical Exam  * General Appearance: Alert, cooperative, interactive, oriented to time, place, and person, in no acute distress  * Eyes: PERRL, conjunctiva/corneas clear, EOM's intact, fundi benign, both eyes  * Throat: Lips, mucosa, and tongue normal; teeth and gums normal  * Neck: Supple, symmetrical, trachea midline, no adenopathy   * Lungs: Good bilateral air entry, normal breath sounds (Clear to auscultation bilaterally, no audible wheezes, crackles, or rhonchi)  * Heart: Regular Rate and Rhythm, normal S1 and S2, no audible murmur, rub, or gallop  * Abdomen: Symmetric, non-distended, soft, G tube stoma noted with no erythema, warmth, tenderness, or purulent discharge noted, s/p loosening of G tube bumper, bowel sounds active all four quadrants, no masses, no organomegaly (no hepatosplenomegaly)      Investigations   Laboratory Workup      - CBC:                        10.1   12.43 )-----------( 159      ( 11 Nov 2023 05:47 )             30.5       - Hgb Trend:  10.1  11-11-23 @ 05:47        - Chemistry:  11-11    131<L>  |  99  |  47<H>  ----------------------------<  99  4.8   |  23  |  2.7<H>    Ca    6.8<L>      11 Nov 2023 11:29  Phos  3.8     11-11  Mg     2.1     11-11    TPro  6.9  /  Alb  2.7<L>  /  TBili  0.3  /  DBili  x   /  AST  25  /  ALT  15  /  AlkPhos  52  11-11    Liver panel trend:  TBili 0.3   /   AST 25   /   ALT 15   /   AlkP 52   /   Tptn 6.9   /   Alb 2.7    /   DBili --      11-11      - Coagulation Studies:  PT/INR - ( 11 Nov 2023 05:47 )   PT: 13.40 sec;   INR: 1.17 ratio    PTT - ( 11 Nov 2023 05:47 )  PTT:27.4 sec      Microbiological Workup  Urinalysis Basic - ( 11 Nov 2023 11:29 )    Color: x / Appearance: x / SG: x / pH: x  Gluc: 99 mg/dL / Ketone: x  / Bili: x / Urobili: x   Blood: x / Protein: x / Nitrite: x   Leuk Esterase: x / RBC: x / WBC x   Sq Epi: x / Non Sq Epi: x / Bacteria: x      Current Medications  Standing Medications  ampicillin/sulbactam  IVPB      ampicillin/sulbactam  IVPB 1.5 Gram(s) IV Intermittent every 12 hours  dextrose 5% + sodium chloride 0.9%. 1000 milliLiter(s) (75 mL/Hr) IV Continuous <Continuous>  fentaNYL   Patch  25 MICROgram(s)/Hr 1 Patch Transdermal every 72 hours  folic acid 1 milliGRAM(s) Oral daily  heparin   Injectable 5000 Unit(s) SubCutaneous every 12 hours  methocarbamol 500 milliGRAM(s) Oral three times a day  pantoprazole   Suspension 40 milliGRAM(s) Oral two times a day  polyethylene glycol 3350 17 Gram(s) Oral daily  senna 2 Tablet(s) Oral at bedtime    PRN Medications  acetaminophen     Tablet .. 650 milliGRAM(s) Oral every 6 hours PRN Mild Pain (1 - 3), Moderate Pain (4 - 6)  melatonin 3 milliGRAM(s) Oral at bedtime PRN Insomnia  morphine  - Injectable 4 milliGRAM(s) IV Push every 6 hours PRN Severe Pain (7 - 10)  ondansetron Injectable 4 milliGRAM(s) IV Push every 8 hours PRN Nausea and/or Vomiting    Singles Doses Administered  (ADM OVERRIDE) 4 each &lt;see task&gt; GiveOnce  ampicillin/sulbactam  IVPB 1.5 Gram(s) IV Intermittent once

## 2023-11-11 NOTE — PATIENT PROFILE ADULT - FALL HARM RISK - HARM RISK INTERVENTIONS
Assistance with ambulation/Assistance OOB with selected safe patient handling equipment/Communicate Risk of Fall with Harm to all staff/Discuss with provider need for PT consult/Monitor for mental status changes/Monitor gait and stability/Reinforce activity limits and safety measures with patient and family/Review medications for side effects contributing to fall risk/Sit up slowly, dangle for a short time, stand at bedside before walking/Tailored Fall Risk Interventions/Toileting schedule using arm’s reach rule for commode and bathroom/Use of alarms - bed, chair and/or voice tab/Visual Cue: Yellow wristband and red socks/Bed in lowest position, wheels locked, appropriate side rails in place/Call bell, personal items and telephone in reach/Instruct patient to call for assistance before getting out of bed or chair/Non-slip footwear when patient is out of bed/[place] to call system/Physically safe environment - no spills, clutter or unnecessary equipment/Purposeful Proactive Rounding/Room/bathroom lighting operational, light cord in reach

## 2023-11-11 NOTE — CHART NOTE - NSCHARTNOTEFT_GEN_A_CORE
I was called to assess the patient for increased work of breathing. On my assessment the patient was comfortable lying in bed, complaining of trouble breathing and phlegm that cannot be expectorated.    /60  SpO2  Diffuse rhonchi were audible on auscultation. I was called to assess the patient for increased work of breathing. On my assessment the patient was comfortable lying in bed, complaining of trouble breathing and phlegm that cannot be expectorated. Denied chest pain.    /60,  SpO2 84% on RA, increased to 92% on 4L NC. Tachypneic. Diffuse rhonchi were audible on auscultation.    Previous CXR showed bilateral effusions, patient on empiric abx for possible pneumonia.  EKG showed sinus tachycardia with ST depressions in leads I, II I was called to assess the patient for increased work of breathing. On my assessment the patient was comfortable lying in bed, complaining of trouble breathing and phlegm that cannot be expectorated. Denied chest pain. endorsed to me that there is a long history of 1/2 pack/day smoking, quit two years ago.    /60,  SpO2 84% on RA, increased to 92% on 4L NC, was not on oxygen at home. Tachypneic. Diffuse rhonchi were audible on auscultation.    Previous CXR showed bilateral effusions, patient on empiric abx for possible pneumonia.  EKG showed sinus tachycardia with ST depressions in leads I, II, aVF, ST elevation in aVR.    A/P  -Suspect COPD exacerbation, started Duonebs with symptomatic improvement and Solumedrol 40 mg bid, patient already on empiric antibiotics for pneumonia    -EKG changes likely demand ischemia, f/u 8 pm troponin, serial EKGs    -Cannot rule out PE especially with hx of MM predisposing to hyperviscosity, f/u D-dimer I was called to assess the patient for increased work of breathing. On my assessment the patient was comfortable lying in bed, complaining of trouble breathing and phlegm that cannot be expectorated. Denied chest pain. endorsed to me that there is a long history of 1/2 pack/day smoking, quit two years ago.    /60,  SpO2 84% on RA, increased to 92% on 4L NC, was not on oxygen at home. Tachypneic. Diffuse rhonchi were audible on auscultation.    Previous CXR showed bilateral effusions, patient on empiric abx for possible pneumonia.  EKG showed sinus tachycardia with ST depressions in leads I, II, aVF, ST elevation in aVR.    A/P  -Suspect COPD exacerbation, started Duonebs with symptomatic improvement and Solumedrol 40 mg bid, patient already on empiric antibiotics for pneumonia. Chest PT.    -EKG changes likely demand ischemia, f/u 8 pm troponin, serial EKGs    -Cannot rule out PE especially with hx of MM predisposing to hyperviscosity, f/u D-dimer I was called to assess the patient for increased work of breathing. On my assessment the patient was comfortable lying in bed, complaining of trouble breathing and phlegm that cannot be expectorated. Denied chest pain. Endorsed to me that there is a long history of smoking 1/2 pack a day, quit two years ago.    /60,  SpO2 84% on RA, increased to 92% on 4L NC, was not on oxygen at home. Tachypneic. Diffuse rhonchi were audible on auscultation.    Previous CXR showed bilateral effusions, patient on empiric abx for possible pneumonia.  EKG showed sinus tachycardia with ST depressions in leads I, II, aVF, ST elevation in aVR.    A/P  -Suspect COPD exacerbation, started Duonebs with symptomatic improvement and Solumedrol 40 mg bid, patient already on empiric antibiotics for pneumonia. Chest PT.    -EKG changes likely demand ischemia, f/u 8 pm troponin, serial EKGs    -Cannot rule out PE especially with hx of MM predisposing to hyperviscosity, f/u D-dimer I was called to assess the patient for increased work of breathing. On my assessment the patient was comfortable lying in bed, complaining of trouble breathing and phlegm that cannot be expectorated. Denied chest pain. Endorsed to me that there is a long history of smoking 1/2 pack a day, quit two years ago. He mentioned that he had a productive cough that started after PEG placement.    /60,  SpO2 84% on RA, increased to 92% on 4L NC, was not on oxygen at home. Tachypneic. Diffuse rhonchi were audible on auscultation.    Previous CXR showed bilateral effusions, patient on empiric abx for possible pneumonia.  EKG showed sinus tachycardia with ST depressions in leads I, II, aVF, ST elevation in aVR.    A/P  -Suspect COPD exacerbation, started Duonebs with symptomatic improvement and Solumedrol 40 mg bid, patient already on empiric antibiotics for pneumonia. Chest PT.    -EKG changes likely demand ischemia, f/u 8 pm troponin, serial EKGs    -Cannot rule out PE especially in the setting of MM predisposing to hyperviscosity, f/u D-dimer I was called to assess the patient for increased work of breathing. On my assessment the patient was comfortable lying in bed, complaining of trouble breathing and phlegm that cannot be expectorated. Denied chest pain. On questioning he mentioned he had a productive cough that started after PEG placement, with a long history of smoking 1/2 pack a day, quit two years ago.    /60,  SpO2 84% on RA, increased to 92% on 4L NC, was not on oxygen at home. Tachypneic. Diffuse rhonchi were audible on auscultation.    Previous CXR showed bilateral effusions, patient on empiric abx for possible pneumonia.  EKG showed sinus tachycardia with ST depressions in leads I, II, aVF, ST elevation in aVR.    A/P  -Suspect COPD exacerbation, started Duonebs with symptomatic improvement and Solumedrol 40 mg bid, patient already on empiric antibiotics for pneumonia. Chest PT.    -EKG changes likely demand ischemia, f/u 8 pm troponin, serial EKGs    -Cannot rule out PE especially in the setting of MM predisposing to hyperviscosity, f/u D-dimer I was called to assess the patient for increased work of breathing. On my assessment the patient was comfortable lying in bed, complaining of trouble breathing and phlegm that cannot be expectorated. Denied chest pain. On questioning he mentioned he had a productive cough that started after PEG placement, with a long history of smoking 1/2 pack a day, quit two years ago.    /60,  SpO2 84% on RA, increased to 92% on 4L NC, was not on oxygen at home. Tachypneic. Diffuse rhonchi were audible on auscultation.    Previous CXR showed bilateral effusions, patient on empiric abx for possible pneumonia.  EKG showed sinus tachycardia with ST depressions in leads I, II, aVF, ST elevation in aVR.    A/P  -Suspect COPD exacerbation 2/2 pneumonia, started Duonebs with symptomatic improvement and Solumedrol 40 mg bid, patient already on empiric Zosyn. Chest PT and guaifenesin ordered as well.    -EKG changes likely demand ischemia, f/u 8 pm troponin, serial EKGs    -Cannot rule out PE especially in the setting of MM predisposing to hyperviscosity, f/u D-dimer I was called to assess the patient for increased work of breathing. On my assessment the patient was comfortable lying in bed, complaining of trouble breathing and phlegm that cannot be expectorated. Denied chest pain. On questioning he mentioned he had a productive cough that started after PEG placement, with a long history of smoking 1/2 pack a day, quit two years ago.    /60,  SpO2 84% on RA, increased to 92% on 4L NC, was not on oxygen at home. Tachypneic. Diffuse rhonchi were audible on auscultation.    Previous CXR showed bilateral effusions, patient on empiric abx for possible pneumonia.  EKG showed sinus tachycardia with ST depressions in leads I, II, aVF, ST elevation in aVR.    A/P  -Suspect COPD exacerbation 2/2 pneumonia  -started Duonebs with symptomatic improvement and Solumedrol 40 mg bid, patient already on empiric Zosyn. Chest PT and guaifenesin ordered as well.    -EKG changes likely demand ischemia, f/u 8 pm troponin, serial EKGs    -Cannot rule out PE especially in the setting of MM predisposing to hyperviscosity, Wells score 4. f/u 8 pm D-dimer I was called to assess the patient for increased work of breathing. On my assessment the patient was comfortable lying in bed, complaining of trouble breathing and phlegm that cannot be expectorated. Denied chest pain. On questioning he mentioned he had a productive cough that started after PEG placement, with a long history of smoking 1/2 pack a day, quit two years ago.    /60,  SpO2 84% on RA, increased to 92% on 4L NC, was not on oxygen at home. Tachypneic. Diffuse rhonchi were audible on auscultation.    Previous CXR showed bilateral effusions, patient on empiric abx for possible pneumonia.  EKG showed sinus tachycardia with ST depressions in leads I, II, aVF, ST elevation in aVR.    A/P  Suspect COPD exacerbation 2/2 pneumonia  -started Duonebs with symptomatic improvement and Solumedrol 40 mg bid, patient already on empiric Zosyn. Chest PT and guaifenesin ordered as well. Check ABG    EKG changes likely demand ischemia  -f/u 8 pm troponin, serial EKGs    Cannot rule out PE especially in the setting of MM predisposing to hyperviscosity, Wells score 4. No clinical signs of DVT  - f/u 8 pm D-dimer, LE duplex

## 2023-11-11 NOTE — PROGRESS NOTE ADULT - ASSESSMENT
Assessment and Plan  Case of a 58 year old male patient with a history of MM not on chemotherapy, CKD IIIb, and recent admission in 10/2023 for MAC on CKD during which he was found to have mild pharyngeal dysphagia who presented on 11/10 for PEG tube placement s/p EGD and PEG tube placement on 11/10. We are on board in setting of recently placed PEG tube on 11/10.      Mild Pharyngeal Dysphagia, Tight Upper Esophageal Sphincter, and Failure to Thrive  Status Post PEG Tube Placement  * History of progressive dysphagia to solids > liquids over the last few months associated with globus sensation and weight loss; no odynophagia  * BMI: 14 kg/m2  * Status Post Speech and Swallow evaluation  * Mild pharyngeal dysphagia noted on VFSS on 10/25  * Calorie Count:  * Hemodynamically stable; no fevers  * Status post EGD and PEG tube placement on 11/10: normal Esophageal mucosa; tight UES; erosive gastritis; normal duodenal mucosa  * Status post loosening of PEG tube bumper on 11/11    RECOMMENDATIONS  - Can start feeding through G tube starting today  - Can continue medication administration through G tube  - Care Instructions for patient        - Clean the PEG site with soap and water daily       - Apply clean dressing to the site       - Apply abdominal binder to prevent pulling and tape the tube to the skin to avoid tugging       - Flush the tube with 30cc of water before and after feeds and medications      Thank you for your consult.  - Please note that plan was discussed with Dr. Isbell and communicated with medical team.   - Please reach GI on [phone] during weekdays till 5pm.  - Please call the GI service line after 5pm on Weekdays and anytime on Weekends: [phone].      Derick Schreiber MD  PGY - 4 Gastroenterology Fellow   [facility]            - Follow up with our [facility] located at [address] [place] [address]. Phone Number: [phone]    - Follow up with our [facility] located at [address] [place] [address]. Telephone No: [phone]    Thank you for your consult.  - Please note that plan was discussed with Dr. SQUIRES and communicated with medical team.   - Please reach GI on [phone] during weekdays till 5pm.  - Please call the GI service line after 5pm on Weekdays and anytime on Weekends: [phone].      Derick Schreiber MD  PGY - 4 Gastroenterology Fellow   [facility]

## 2023-11-12 NOTE — DISCHARGE NOTE FOR THE EXPIRED PATIENT - HOSPITAL COURSE
Hospital COURSE    Pt is a 58-year-old male PMHx of multiple myeloma diagnosed in 2017 not on any chemotherapy or radiation follows with Dr. Valadez. Pt had EGD and PEG placement 11/10 for failure to thrive and was admitted for post procedure monitoring. Patient was started on tube feeds and medications. Leukocytosis noted, patient was afebrile on unasyn.      11/11 1800   Patient complained of shortness of breath and desatted to 84% on RA. Denied chest pain. Patient was tachycardic with  and laboring to breath. Patient placed on 4L NC with improvement in O2 Sat to 92%. D-dimer, trops, ECG and CXR was ordered. D-dimer was negative adjusted for age, trop was elevated at 1.02, CXR showed mild left lung effusion, ECG showed AVR st elevation and diffuse st depression. Cardiology recommended trending trops and serial ECG. Previous CXR showed bilateral effusions.       11/12 0340   Patient was found laboring to breath and desatted to 80s. Patient denied chest pain. Rapid response was called. Patient was put on rebreather 6L and CXR, ECG, ABG was ordered. ABG showed elevated lactate and was acidotic. ECG showed similar findings and cardiology was contacted. Per cardio fellow, unlikely to be cardiac. CXR showed left lung interval opacification. Lung sounds diminished on PE. Zosyn started for pneumonia. Patient denied chest pain and breathing improved on 6L satting 96%. Pulm/crit was consulted for ugrdae to SDU. Recommended BIPAP, chest PT, hypertonic saline nebs, nasal suctioning, and mucomyst. Cardiology consulted.    Started on HFNC and Transferred to SICU s/p 2 rapids for reduced O2 saturations. Pt was noted to be hypotensive with MAP in 60s then to 58. Levo was to be started. As it was being readied, pt was unresponsive. Blood pressure could not be obtained. Pulse was weak and initially at 74, but steadily dropped. Rapid response was called. . Family was at bedside and wanted DNR/ DNI. Pulses weak and dropping. Atropine and epi pushed. O2 administered by BVM. No response. Blood pressure could not be obtained. Pulses ceased. No cardiac activity on US of heart. Death called at 1342 for cardiopulmonary criteria.

## 2023-11-12 NOTE — DISCHARGE NOTE FOR THE EXPIRED PATIENT - NS EXPIRED FAMCONTACTED
Patient would like communication of their results via:        Home Phone: [phone] (home)  Okay to leave a message containing results? Yes    Cell Phone:   Telephone Information:   Mobile [phone]     Okay to leave a message containing results? Yes     yes

## 2023-11-12 NOTE — PROGRESS NOTE ADULT - TIME BILLING
direct patient care and chart review  -coordinated current plan of care with medical staff (senior residents)  -updated family
direct patient care and chart review  -coordinated current plan of care with medical staff on MDR
Coordination of care

## 2023-11-12 NOTE — RAPID RESPONSE TEAM SUMMARY - NSTRANSFERTORRT_GEN_ALL_CORE
Stepdown Melolabial Transposition Flap Text: The defect edges were debeveled with a #15 scalpel blade.  Given the location of the defect and the proximity to free margins a melolabial flap was deemed most appropriate.  Using a sterile surgical marker, an appropriate melolabial transposition flap was drawn incorporating the defect.    The area thus outlined was incised deep to adipose tissue with a #15 scalpel blade.  The skin margins were undermined to an appropriate distance in all directions utilizing iris scissors.

## 2023-11-12 NOTE — PROGRESS NOTE ADULT - ASSESSMENT
·	Acute Hypoxic respiratory failure - suspected Aspiration Pneumonitis vs. Acute COPD exac  ·	NSTEMI - demand Ischemia   ·	Dysphagia - s/p Peg tube placement   ·	FTT - Severe protein malnutrition/Underweight BMI < 14   ·	MAC - suspected ATN  ·	Hyperkalemia   ·	Hypocalcemia   ·	Acute Hyponatremia   ·	MM   ·	Wound/skin ulcer POA    -patient s/p Rapid Response overnight and already upgraded to ICU before my shift started   -Broad spectrum IV abx - ID consult   -IV steroids, c/w high flow O2 - recc as per Pulm in critical care unit   -Echo, cardio consult - follow reccs   -started Peg tube feeds 11/11/23 - GI follow up   -monitor electrolytes - Lokelma, insulin 10 U with D50 push   -check urine Protein:Cr ratio; Renal US; Nephro consult   -IV calcium Gluconate - monitor BMP  -skin care as per nursing - wound care nurse consult for proper assessment and diagnosis   -patient sees Dr. Valadez from Oncology  -Discussed current deteriorating clinical status with sister Madisyn at bedside this am - aware of the overall poor prognosis - She will have patient's son (age 14) to come and visit him today   -FULL CODE - prognosis poor     DISPO: already upgraded to Critical care unit - care as per ICU team   -spoke to family this am   -patient is comprehending his care this am - continue to assess his mentation  during the course of the day     Attending Physician Dr. Payal Lovett # [id number] .      ·	Acute Hypoxic respiratory failure - suspected Aspiration Pneumonitis vs. Acute COPD exac  ·	NSTEMI - demand Ischemia   ·	Dysphagia - s/p Peg tube placement   ·	FTT - Severe protein malnutrition/Underweight BMI < 14   ·	MAC - suspected ATN  ·	Hyperkalemia   ·	Hypocalcemia   ·	Acute Hyponatremia   ·	MM   ·	Wound/skin ulcer POA    -patient s/p Rapid Response overnight and already upgraded to ICU before my shift started   -Broad spectrum IV abx - ID consult   -IV steroids, c/w high flow O2 - recc as per Pulm in critical care unit   -Echo, cardio consult - follow reccs   -started Peg tube feeds 11/11/23 - GI follow up   -monitor electrolytes - Lokelma, insulin 10 U with D50 push   -check urine Protein:Cr ratio; Renal US; Nephro consult   -IV calcium Gluconate - monitor BMP  -skin care as per nursing - wound care nurse consult for proper assessment and diagnosis   -patient sees Dr. Valadez from Oncology  -Discussed current deteriorating clinical status with sister Madisyn at bedside this am - aware of the overall poor prognosis - She will have patient's son (age 14) to come and visit him today   -FULL CODE - prognosis poor     DISPO: patient's upgrade coordinated by Dr. Santos/Zenon this morning - care as per ICU team   -I also spoke to family this am     Attending Physician Dr. Payal Lovett # [id number] .

## 2023-11-12 NOTE — CHART NOTE - NSCHARTNOTEFT_GEN_A_CORE
Transfer Note  ---------------------------    Transfer from: 4A  Transfer to: SDU  Accepting Physician: Dr. Meyers      [facility] COURSE    Pt is a 58-year-old male PMHx of multiple myeloma diagnosed in 2017 not on any chemotherapy or radiation follows with Dr. Valadez. Pt had EGD and PEG placement 11/10 for failure to thrive and was admitted for post procedure monitoring. Patient was started on tube feeds and medications. Leukocytosis noted, patient was afebrile on unasyn.      11/11 1800   Patient complained of shortness of breath and desatted to 84% on RA. Denied chest pain. Patient was tachycardic with  and laboring to breath. Patient placed on 4L NC with improvement in O2 Sat to 92%. D-dimer, trops, ECG and CXR was ordered. D-dimer was negative adjusted for age, trop was elevated at 1.02, CXR showed mild left lung effusion, ECG showed AVR st elevation and diffuse st depression. Cardiology recommended trending trops and serial ECG. Previous CXR showed bilateral effusions.       11/12 0340   Patient was found laboring to breath and desatted to 80s. Patient denied chest pain. Rapid response was called. Patient was put on rebreather 6L and CXR, ECG, ABG was ordered. ABG showed elevated lactate and was acidotic. ECG showed similar findings and cardiology was contacted. Per cardio fellow, unlikely to be cardiac. CXR showed left lung interval opacification. Lung sounds diminished on PE. Zosyn started for pneumonia. Patient denied chest pain and breathing improved on 6L satting 96%. Pulm/crit was consulted for ugrdae to SDU. Recommended BIPAP, chest PT, hypertonic saline nebs, nasal suctioning, and mucomyst. Cardiology consulted.        To-Do:  - trend trops, serial CXR.  - f/u cardiology consult  - consult ID consult  - resume feeds   - GOC discussion. (patient is full code per last medicine progress note.)  - ID consult?    OBJECTIVE --  Vital Signs Last 24 Hrs  T(C): 36.4 (12 Nov 2023 03:08), Max: 36.4 (12 Nov 2023 03:08)  T(F): 97.5 (12 Nov 2023 03:08), Max: 97.5 (12 Nov 2023 03:08)  HR: 110 (12 Nov 2023 03:08) (97 - 110)  BP: 102/57 (12 Nov 2023 03:08) (98/56 - 102/60)  BP(mean): --  RR: 18 (12 Nov 2023 03:08) (18 - 18)  SpO2: 95% (12 Nov 2023 03:08) (95% - 95%)        I&O's Summary      MEDICATIONS  (STANDING):  acetylcysteine 10%  Inhalation 4 milliLiter(s) Inhalation two times a day  albuterol/ipratropium for Nebulization 3 milliLiter(s) Nebulizer every 4 hours  fentaNYL   Patch  25 MICROgram(s)/Hr 1 Patch Transdermal every 72 hours  folic acid 1 milliGRAM(s) Oral daily  guaiFENesin  milliGRAM(s) Oral every 12 hours  heparin   Injectable 5000 Unit(s) SubCutaneous every 12 hours  lactated ringers. 1000 milliLiter(s) (75 mL/Hr) IV Continuous <Continuous>  methocarbamol 500 milliGRAM(s) Oral three times a day  methylPREDNISolone sodium succinate Injectable 40 milliGRAM(s) IV Push every 12 hours  pantoprazole   Suspension 40 milliGRAM(s) Oral two times a day  piperacillin/tazobactam IVPB.. 2.25 Gram(s) IV Intermittent every 6 hours  polyethylene glycol 3350 17 Gram(s) Oral daily  senna 2 Tablet(s) Oral at bedtime  sodium chloride 3%  Inhalation 4 milliLiter(s) Inhalation every 12 hours    MEDICATIONS  (PRN):  acetaminophen     Tablet .. 650 milliGRAM(s) Oral every 6 hours PRN Mild Pain (1 - 3), Moderate Pain (4 - 6)  albuterol    90 MICROgram(s) HFA Inhaler 1 Puff(s) Inhalation every 4 hours PRN Shortness of Breath and/or Wheezing  melatonin 3 milliGRAM(s) Oral at bedtime PRN Insomnia  morphine  - Injectable 4 milliGRAM(s) IV Push every 6 hours PRN Severe Pain (7 - 10)  ondansetron Injectable 4 milliGRAM(s) IV Push every 8 hours PRN Nausea and/or Vomiting        LABS                                            10.1                  Neurophils% (auto):   76.0   (11-11 @ 05:47):    12.43)-----------(159          Lymphocytes% (auto):  12.7                                          30.5                   Eosinphils% (auto):   0.8      Manual%: Neutrophils x    ; Lymphocytes x    ; Eosinophils x    ; Bands%: x    ; Blasts x                                    127    |  93     |  49                  Calcium: 6.9   / iCa: x      (11-11 @ 20:10)    ----------------------------<  108       Magnesium: x                                5.3     |  19     |  2.8              Phosphorous: x        TPro  6.9    /  Alb  2.7    /  TBili  0.3    /  DBili  x      /  AST  25     /  ALT  15     /  AlkPhos  52     11 Nov 2023 05:47    ( 11-11 @ 20:10 )   PT: x    ;   INR: x      aPTT: 31.3 sec          ASSESSMENT & PLAN:     # Dysphagia   # s/p Peg tube placement   # FTT - Severe protein malnutrition/Underweight BMI < 14  # Dyspnea / suspected aspiration Pneumonitis   # MAC   # Acute Hyponatremia   # MM   # Wound/skin ulcer     -start Peg tube feeds - discussed with GI  -monitor electrolytes   -stat chest xray, leukocytosis noted; afebrile - empiric abx   -NS IVF - monitor BMP and stop fluids next 8 hrs - monitor for volume overload   -serum Cr uptrended - check urine protein Cr ratio - renal US   -clarify home meds   -skin care as per nursing - wound care nurse consult for proper assessment and diagnosis   -overall prognosis guarded - full code   -outpatient follow up with oncologist Dr. Valadez             For Follow-Up:

## 2023-11-12 NOTE — CONSULT NOTE ADULT - SUBJECTIVE AND OBJECTIVE BOX
HPI:  Pt is a 58-year-old male PMHx of multiple myeloma diagnosed in 2017 not on any chemotherapy or radiation follows with Dr. Valadez. Pt presented to [facility] for  EGD and PEG placement for failure to thrive being admitted for post procedure monitoring. Of note, pt recently admitted 10/2023 for flank pain, electrolytes abnormalities and dysphagia.     On admission vitals: /78, , RR 16, T 98.3F    Pt was drowsy on my exam. Information obtained from sister Madisyn Gonzalez [phone] at bedside and [facility].  (10 Nov 2023 13:54)      PAST MEDICAL & SURGICAL HISTORY  Multiple myeloma, remission status unspecified    Chronic kidney disease, unspecified CKD stage    Folate deficiency    Muscle spasm of back    History of dysphagia    No significant past surgical history        FAMILY HISTORY:  FAMILY HISTORY:      SOCIAL HISTORY:  []smoker  []Alcohol  []Drug    ALLERGIES:  No Known Allergies      MEDICATIONS:  MEDICATIONS  (STANDING):  acetylcysteine 10%  Inhalation 4 milliLiter(s) Inhalation two times a day  albuterol/ipratropium for Nebulization 3 milliLiter(s) Nebulizer every 6 hours  albuterol/ipratropium for Nebulization 3 milliLiter(s) Nebulizer every 4 hours  fentaNYL   Patch  25 MICROgram(s)/Hr 1 Patch Transdermal every 72 hours  folic acid 1 milliGRAM(s) Oral daily  guaiFENesin  milliGRAM(s) Oral every 12 hours  heparin   Injectable 5000 Unit(s) SubCutaneous every 12 hours  lactated ringers. 1000 milliLiter(s) (75 mL/Hr) IV Continuous <Continuous>  methocarbamol 500 milliGRAM(s) Oral three times a day  methylPREDNISolone sodium succinate Injectable 40 milliGRAM(s) IV Push every 12 hours  pantoprazole   Suspension 40 milliGRAM(s) Oral two times a day  piperacillin/tazobactam IVPB.. 2.25 Gram(s) IV Intermittent every 6 hours  polyethylene glycol 3350 17 Gram(s) Oral daily  senna 2 Tablet(s) Oral at bedtime  sodium chloride 3%  Inhalation 4 milliLiter(s) Inhalation every 12 hours    MEDICATIONS  (PRN):  acetaminophen     Tablet .. 650 milliGRAM(s) Oral every 6 hours PRN Mild Pain (1 - 3), Moderate Pain (4 - 6)  albuterol    90 MICROgram(s) HFA Inhaler 1 Puff(s) Inhalation every 4 hours PRN Shortness of Breath and/or Wheezing  melatonin 3 milliGRAM(s) Oral at bedtime PRN Insomnia  morphine  - Injectable 4 milliGRAM(s) IV Push every 6 hours PRN Severe Pain (7 - 10)  ondansetron Injectable 4 milliGRAM(s) IV Push every 8 hours PRN Nausea and/or Vomiting      HOME MEDICATIONS:  Home Medications:  acetaminophen 325 mg oral capsule: 1 cap(s) orally as needed for  mild pain (10 Nov 2023 13:17)  Colace 100 mg oral capsule: 1 cap(s) orally 2 times a day (10 Nov 2023 13:17)  fentaNYL 25 mcg/hr transdermal film, extended release: 1 patch transdermally 3 times a day (10 Nov 2023 13:17)  folic acid 1 mg oral tablet: 1 tab(s) orally once a day (10 Nov 2023 13:17)  methocarbamol 500 mg oral tablet: 2 tab(s) orally 3 times a day (10 Nov 2023 13:17)  oxyCODONE 5 mg oral tablet: 1 tab(s) orally every 6 hours as needed for  moderate pain (10 Nov 2023 13:17)      VITALS:   T(F): 97.5 (11-12 @ 03:08), Max: 98.6 (11-10 @ 13:32)  HR: 111 (11-12 @ 07:51) (90 - 118)  BP: 102/57 (11-12 @ 03:08) (98/56 - 127/72)  BP(mean): --  RR: 18 (11-12 @ 03:08) (16 - 18)  SpO2: 97% (11-12 @ 11:10) (95% - 99%)    I&O's Summary      REVIEW OF SYSTEMS:  ENT: No vertigo or throat pain   NECK: No pain or stiffness  RESPIRATORY: +ve sob, comfortable on HFNC  CARDIOVASCULAR: No chest pain or palpitations  GASTROINTESTINAL: mild epigastric discomfort  GENITOURINARY: No dysuria  NEUROLOGICAL: No numbness or weakness  SKIN: No itching, no rashes  MSK: no    PHYSICAL EXAM:  NEURO: patient is awake , alert and oriented  GEN: Not in acute distress  NECK: no thyroid enlargement, no JVD  LUNGS: Clear to auscultation bilaterally   CARDIOVASCULAR: S1/S2 present, RRR , no murmurs or rubs, no carotid bruits,  + PP bilaterally  ABD: Soft, non-tender, non-distended, +BS  EXT: No THAO  SKIN: Intact    LABS:                        9.5    18.48 )-----------( 212      ( 12 Nov 2023 08:33 )             29.3     11-12    129<L>  |  94<L>  |  55<H>  ----------------------------<  126<H>  5.5<H>   |  20  |  3.3<H>    Ca    6.6<L>      12 Nov 2023 08:33  Phos  6.4     11-12  Mg     2.2     11-12    TPro  7.1  /  Alb  2.8<L>  /  TBili  0.4  /  DBili  x   /  AST  70<H>  /  ALT  37  /  AlkPhos  60  11-12    PT/INR - ( 11 Nov 2023 05:47 )   PT: 13.40 sec;   INR: 1.17 ratio         PTT - ( 11 Nov 2023 20:10 )  PTT:31.3 sec  Troponin T, Serum: 1.06 ng/mL ** (11-12-23 @ 08:33)  Troponin T, Serum: 0.98 ng/mL ** (11-12-23 @ 01:10)  Troponin T, Serum: 1.02 ng/mL ** (11-11-23 @ 20:10)    CARDIAC MARKERS ( 12 Nov 2023 08:33 )  x     / 1.06 ng/mL / x     / x     / x      CARDIAC MARKERS ( 12 Nov 2023 01:10 )  x     / 0.98 ng/mL / x     / x     / x      CARDIAC MARKERS ( 11 Nov 2023 20:10 )  x     / 1.02 ng/mL / x     / x     / x            Troponin trend:      10-17 Chol 123 LDL -- HDL 34<L> Trig 62      RADIOLOGY:        -CXR:  -TTE:  -CCTA:  -STRESS TEST:  -CATHETERIZATION:    ECG:    TELEMETRY EVENTS:   HPI:  Pt is a 58-year-old male PMHx of multiple myeloma diagnosed in 2017 not on any chemotherapy or radiation follows with Dr. Valadez. Pt presented to [facility] for  EGD and PEG placement for failure to thrive being admitted for post procedure monitoring. Of note, pt recently admitted 10/2023 for flank pain, electrolytes abnormalities and dysphagia.     On admission vitals: /78, , RR 16, T 98.3F    Pt was drowsy on my exam. Information obtained from sister Madisyn Gonzalez [phone] at bedside and [facility].  (10 Nov 2023 13:54)    Interval Hx:   Pt was spoken to at bedside with sister and family. Pt does not have a cardiologist. Had no chest pain throughout this time, only sob. Pt comfortable on HFNC    PAST MEDICAL & SURGICAL HISTORY  Multiple myeloma, remission status unspecified    Chronic kidney disease, unspecified CKD stage    Folate deficiency    Muscle spasm of back    History of dysphagia    No significant past surgical history        FAMILY HISTORY:  FAMILY HISTORY:      SOCIAL HISTORY:  []smoker  []Alcohol  []Drug    ALLERGIES:  No Known Allergies      MEDICATIONS:  MEDICATIONS  (STANDING):  acetylcysteine 10%  Inhalation 4 milliLiter(s) Inhalation two times a day  albuterol/ipratropium for Nebulization 3 milliLiter(s) Nebulizer every 6 hours  albuterol/ipratropium for Nebulization 3 milliLiter(s) Nebulizer every 4 hours  fentaNYL   Patch  25 MICROgram(s)/Hr 1 Patch Transdermal every 72 hours  folic acid 1 milliGRAM(s) Oral daily  guaiFENesin  milliGRAM(s) Oral every 12 hours  heparin   Injectable 5000 Unit(s) SubCutaneous every 12 hours  lactated ringers. 1000 milliLiter(s) (75 mL/Hr) IV Continuous <Continuous>  methocarbamol 500 milliGRAM(s) Oral three times a day  methylPREDNISolone sodium succinate Injectable 40 milliGRAM(s) IV Push every 12 hours  pantoprazole   Suspension 40 milliGRAM(s) Oral two times a day  piperacillin/tazobactam IVPB.. 2.25 Gram(s) IV Intermittent every 6 hours  polyethylene glycol 3350 17 Gram(s) Oral daily  senna 2 Tablet(s) Oral at bedtime  sodium chloride 3%  Inhalation 4 milliLiter(s) Inhalation every 12 hours    MEDICATIONS  (PRN):  acetaminophen     Tablet .. 650 milliGRAM(s) Oral every 6 hours PRN Mild Pain (1 - 3), Moderate Pain (4 - 6)  albuterol    90 MICROgram(s) HFA Inhaler 1 Puff(s) Inhalation every 4 hours PRN Shortness of Breath and/or Wheezing  melatonin 3 milliGRAM(s) Oral at bedtime PRN Insomnia  morphine  - Injectable 4 milliGRAM(s) IV Push every 6 hours PRN Severe Pain (7 - 10)  ondansetron Injectable 4 milliGRAM(s) IV Push every 8 hours PRN Nausea and/or Vomiting      HOME MEDICATIONS:  Home Medications:  acetaminophen 325 mg oral capsule: 1 cap(s) orally as needed for  mild pain (10 Nov 2023 13:17)  Colace 100 mg oral capsule: 1 cap(s) orally 2 times a day (10 Nov 2023 13:17)  fentaNYL 25 mcg/hr transdermal film, extended release: 1 patch transdermally 3 times a day (10 Nov 2023 13:17)  folic acid 1 mg oral tablet: 1 tab(s) orally once a day (10 Nov 2023 13:17)  methocarbamol 500 mg oral tablet: 2 tab(s) orally 3 times a day (10 Nov 2023 13:17)  oxyCODONE 5 mg oral tablet: 1 tab(s) orally every 6 hours as needed for  moderate pain (10 Nov 2023 13:17)      VITALS:   T(F): 97.5 (11-12 @ 03:08), Max: 98.6 (11-10 @ 13:32)  HR: 111 (11-12 @ 07:51) (90 - 118)  BP: 102/57 (11-12 @ 03:08) (98/56 - 127/72)  BP(mean): --  RR: 18 (11-12 @ 03:08) (16 - 18)  SpO2: 97% (11-12 @ 11:10) (95% - 99%)    I&O's Summary      REVIEW OF SYSTEMS:  ENT: No vertigo or throat pain   NECK: No pain or stiffness  RESPIRATORY: +ve sob, comfortable on HFNC  CARDIOVASCULAR: No chest pain or palpitations  GASTROINTESTINAL: mild epigastric discomfort  GENITOURINARY: No dysuria  NEUROLOGICAL: No numbness or weakness  SKIN: No itching, no rashes  MSK: no    PHYSICAL EXAM:  NEURO: patient is awake , alert and oriented  GEN: Not in acute distress, cachectic appearing with temporal wasting  NECK: no thyroid enlargement, no JVD  LUNGS: no breath sounds on the left, decreased on the right with crackles  CARDIOVASCULAR: S1/S2 present, RRR , no murmurs  ABD: Slightly tender on the PEG site  EXT: No THAO  SKIN: Intact    LABS:                        9.5    18.48 )-----------( 212      ( 12 Nov 2023 08:33 )             29.3     11-12    129<L>  |  94<L>  |  55<H>  ----------------------------<  126<H>  5.5<H>   |  20  |  3.3<H>    Ca    6.6<L>      12 Nov 2023 08:33  Phos  6.4     11-12  Mg     2.2     11-12    TPro  7.1  /  Alb  2.8<L>  /  TBili  0.4  /  DBili  x   /  AST  70<H>  /  ALT  37  /  AlkPhos  60  11-12    PT/INR - ( 11 Nov 2023 05:47 )   PT: 13.40 sec;   INR: 1.17 ratio         PTT - ( 11 Nov 2023 20:10 )  PTT:31.3 sec  Troponin T, Serum: 1.06 ng/mL ** (11-12-23 @ 08:33)  Troponin T, Serum: 0.98 ng/mL ** (11-12-23 @ 01:10)  Troponin T, Serum: 1.02 ng/mL ** (11-11-23 @ 20:10)    CARDIAC MARKERS ( 12 Nov 2023 08:33 )  x     / 1.06 ng/mL / x     / x     / x      CARDIAC MARKERS ( 12 Nov 2023 01:10 )  x     / 0.98 ng/mL / x     / x     / x      CARDIAC MARKERS ( 11 Nov 2023 20:10 )  x     / 1.02 ng/mL / x     / x     / x            Troponin trend:      10-17 Chol 123 LDL -- HDL 34<L> Trig 62      RADIOLOGY:    -CXR: left sided opacity    ECG: sinus tach, ST depressions in 1, aVL, V2, V3, ST elevations in AVR

## 2023-11-12 NOTE — CONSULT NOTE ADULT - ATTENDING COMMENTS
In brief, 58M with history of multiple myeloma not on treatment, admitted for elective EGD and PEG placement for failure to thrive.  Following day developed acute hypoxic respiratory failure with mild improvement with NC.  In this setting was noted on POCUS to have severe LV dysfunction and ECG with diffuse ST depressions and BABAK in aVR.  No chest pain.  Mild/stable troponin elevation 1.02 ->0.98 -> 1.06 in the setting of concurrent significant renal disease with Cr 3.3.  LIJ venous sat of 60%.  Acute metabolic acidosis (pH 7.22, HCO3 18, pCO2 45)  WBC 18  D-dimer negative adj. for age    No formal TTE on file.  CXR with bilateral opacities/effusions L>R    Impression of stress induced cardiomyopathy 2/2 pneumonia with mucous plugging.  Extent of LV dysfunction does not correlate with the relatively low/stable level of troponin elevation, particularly in the context of CKD.  Venous sat of 60% via IJ cental line also argues against a primary cardiac etiology.    Shortly after initiation of HFNC and transfer to SICU patient became unresponsive. Prior to initiation of ACLS family agreed for DNR/DNI.  Patient passed at 1342.

## 2023-11-12 NOTE — CONSULT NOTE ADULT - ASSESSMENT
58-year-old male past medical history of multiple myeloma diagnosed in 2017 not on any chemotherapy or radiation presented to [facility] for outpatient EGD and PEG placement for failure to thrive being admitted for post procedure monitoring.     # Dysphagia with failure to thrive s/p EGD with PEG placement:   - EGD: tight UES, normal esophagus, erosive gastritis, normal duodenum, successful PEG placement   - not on any blood thinners    recommendations:  - Can use PEG for medications in 4 hours  - Can use PEG for feedings in AM after evaluation by GI fellow  - Flush before and after each use  - Keep abdominal binder at all times  - PPI twice daily  - monitor for any signs of bleeding  - nutrition eval for tube feed optimization    discussed with patient and sister  discussed with accepting hospitalist and MAR
Pt is a 59yo Male who presents for an elective G tube - called by anesthesia to assess laryngeal airway prior to procedure. Video FFL done, airway narrow but patent.     ·	plan per anesthesia, recall as needed  ·	w/d with attng
Pt is a 58-year-old male PMHx of multiple myeloma diagnosed in 2017 not on any chemotherapy or radiation follows with Dr. Valadez. Pt had EGD and PEG placement 11/10 for failure to thrive and was admitted for post procedure monitoring. Pt has SOB with AHRF overnight and transferred to stepdown, Cardio consulted for elevated troponins and concerning EKG findings.    #Elevated troponin  #Abnormal EKG  #Likely stress cardiomyopathy in the setting of acute illness    - CXR: left sided opacity  - ECG: sinus tach, ST depressions in 1, aVL, V2, V3, ST elevations in AVR  - troponin 1.02>0.98 to now 1.06, stable  - bedside echo over night compared to now at bedside: improved ventricular function and slight improvement in EF, apical hypokinesis    RECS:  - Based on the clinical findings, no chest pain and improvement in bedside echo, likely cause for troponin and apical hypokinesis is stress cardiomyopathy.  - Clinical findings not suggestive of acute plaque rupture, hence no treatment for NSTEMI suggested for now  - will recommend official echo for now, repeat in 1 week to see improvement  - recommend not to trend troponins at this time  - rest of the management per Primary team and pulm

## 2023-11-12 NOTE — PROGRESS NOTE ADULT - SUBJECTIVE AND OBJECTIVE BOX
NAN GONZALEZ  58y  Male      Patient is a 58y old  Male who presents with a chief complaint of Post-procedure monitoring (11 Nov 2023 14:23)      INTERVAL HPI/OVERNIGHT EVENTS:    overnight events noted  -upgraded to ICU for AHRF     Vital Signs Last 24 Hrs  T(C): 36.4 (12 Nov 2023 03:08), Max: 36.4 (12 Nov 2023 03:08)  T(F): 97.5 (12 Nov 2023 03:08), Max: 97.5 (12 Nov 2023 03:08)  HR: 111 (12 Nov 2023 07:51) (100 - 114)  BP: 102/57 (12 Nov 2023 03:08) (102/57 - 102/60)  RR: 18 (12 Nov 2023 03:08) (18 - 18)  SpO2: 97% (12 Nov 2023 11:10) (95% - 99%)    Parameters below as of 12 Nov 2023 11:10  Patient On (Oxygen Delivery Method): nasal cannula, high flow  O2 Flow (L/min): 40  O2 Concentration (%): 60    PHYSICAL EXAM:  GENERAL: on high flow O2 - distressed - cachectic - ill appearing   HEAD:  Atraumatic, Normocephalic  EYES: EOMI, PERRLA, conjunctiva and sclera clear  NERVOUS SYSTEM:  awake and alert to name and place   CHEST/LUNG: rhonchi b/l  CV/HEART: Regular rate and rhythm  GI/ABDOMEN: Soft abdomen + Peg tube   EXTREMITIES:  generalized weakness but moves all extrem      LAB:                        9.5    18.48 )-----------( 212      ( 12 Nov 2023 08:33 )             29.3     11-12    129<L>  |  94<L>  |  55<H>  ----------------------------<  126<H>  5.5<H>   |  20  |  3.3<H>    Ca    6.6<L>      12 Nov 2023 08:33  Phos  6.4     11-12  Mg     2.2     11-12    TPro  7.1  /  Alb  2.8<L>  /  TBili  0.4  /  DBili  x   /  AST  70<H>  /  ALT  37  /  AlkPhos  60  11-12    CARDIAC MARKERS ( 12 Nov 2023 08:33 )  x     / 1.06 ng/mL / x     / x     / x      CARDIAC MARKERS ( 12 Nov 2023 01:10 )  x     / 0.98 ng/mL / x     / x     / x      CARDIAC MARKERS ( 11 Nov 2023 20:10 )  x     / 1.02 ng/mL / x     / x     / x           Daily   CAPILLARY BLOOD GLUCOSE      POCT Blood Glucose.: 126 mg/dL (12 Nov 2023 05:58)  POCT Blood Glucose.: 111 mg/dL (11 Nov 2023 21:24)    Urinalysis Basic - ( 12 Nov 2023 08:33 )    Color: x / Appearance: x / SG: x / pH: x  Gluc: 126 mg/dL / Ketone: x  / Bili: x / Urobili: x   Blood: x / Protein: x / Nitrite: x   Leuk Esterase: x / RBC: x / WBC x   Sq Epi: x / Non Sq Epi: x / Bacteria: x      LIVER FUNCTIONS - ( 12 Nov 2023 08:33 )  Alb: 2.8 g/dL / Pro: 7.1 g/dL / ALK PHOS: 60 U/L / ALT: 37 U/L / AST: 70 U/L / GGT: x               RADIOLOGY:    Imaging Personally visualized and Reviewed:  [y ] YES  [ ] NO      MEDS:  acetaminophen     Tablet .. 650 milliGRAM(s) Oral every 6 hours PRN  acetylcysteine 10%  Inhalation 4 milliLiter(s) Inhalation two times a day  albuterol    90 MICROgram(s) HFA Inhaler 1 Puff(s) Inhalation every 4 hours PRN  albuterol/ipratropium for Nebulization 3 milliLiter(s) Nebulizer every 6 hours  albuterol/ipratropium for Nebulization 3 milliLiter(s) Nebulizer every 4 hours  fentaNYL   Patch  25 MICROgram(s)/Hr 1 Patch Transdermal every 72 hours  folic acid 1 milliGRAM(s) Oral daily  guaiFENesin  milliGRAM(s) Oral every 12 hours  heparin   Injectable 5000 Unit(s) SubCutaneous every 12 hours  lactated ringers. 1000 milliLiter(s) IV Continuous <Continuous>  melatonin 3 milliGRAM(s) Oral at bedtime PRN  methocarbamol 500 milliGRAM(s) Oral three times a day  methylPREDNISolone sodium succinate Injectable 40 milliGRAM(s) IV Push every 12 hours  morphine  - Injectable 4 milliGRAM(s) IV Push every 6 hours PRN  ondansetron Injectable 4 milliGRAM(s) IV Push every 8 hours PRN  pantoprazole   Suspension 40 milliGRAM(s) Oral two times a day  piperacillin/tazobactam IVPB.. 2.25 Gram(s) IV Intermittent every 6 hours  polyethylene glycol 3350 17 Gram(s) Oral daily  senna 2 Tablet(s) Oral at bedtime  sodium chloride 3%  Inhalation 4 milliLiter(s) Inhalation every 12 hours         NAN GONZALEZ  58y  Male      Patient is a 58y old  Male who presents with a chief complaint of Post-procedure monitoring (11 Nov 2023 14:23)      INTERVAL HPI/OVERNIGHT EVENTS:    overnight events noted  -upgraded to ICU for AHRF - see Dr. Santos/Zenon PEREIRA note - upgrade coordinated by him and endorsed to ICU team by overnight staff     Vital Signs Last 24 Hrs  T(C): 36.4 (12 Nov 2023 03:08), Max: 36.4 (12 Nov 2023 03:08)  T(F): 97.5 (12 Nov 2023 03:08), Max: 97.5 (12 Nov 2023 03:08)  HR: 111 (12 Nov 2023 07:51) (100 - 114)  BP: 102/57 (12 Nov 2023 03:08) (102/57 - 102/60)  RR: 18 (12 Nov 2023 03:08) (18 - 18)  SpO2: 97% (12 Nov 2023 11:10) (95% - 99%)    Parameters below as of 12 Nov 2023 11:10  Patient On (Oxygen Delivery Method): nasal cannula, high flow  O2 Flow (L/min): 40  O2 Concentration (%): 60    PHYSICAL EXAM:  GENERAL: on high flow O2 - distressed - cachectic - ill appearing   HEAD:  Atraumatic, Normocephalic  EYES: EOMI, PERRLA, conjunctiva and sclera clear  NERVOUS SYSTEM:  awake and alert to name and place   CHEST/LUNG: rhonchi b/l  CV/HEART: Regular rate and rhythm  GI/ABDOMEN: Soft abdomen + Peg tube   EXTREMITIES:  generalized weakness but moves all extrem      LAB:                        9.5    18.48 )-----------( 212      ( 12 Nov 2023 08:33 )             29.3     11-12    129<L>  |  94<L>  |  55<H>  ----------------------------<  126<H>  5.5<H>   |  20  |  3.3<H>    Ca    6.6<L>      12 Nov 2023 08:33  Phos  6.4     11-12  Mg     2.2     11-12    TPro  7.1  /  Alb  2.8<L>  /  TBili  0.4  /  DBili  x   /  AST  70<H>  /  ALT  37  /  AlkPhos  60  11-12    CARDIAC MARKERS ( 12 Nov 2023 08:33 )  x     / 1.06 ng/mL / x     / x     / x      CARDIAC MARKERS ( 12 Nov 2023 01:10 )  x     / 0.98 ng/mL / x     / x     / x      CARDIAC MARKERS ( 11 Nov 2023 20:10 )  x     / 1.02 ng/mL / x     / x     / x           Daily   CAPILLARY BLOOD GLUCOSE      POCT Blood Glucose.: 126 mg/dL (12 Nov 2023 05:58)  POCT Blood Glucose.: 111 mg/dL (11 Nov 2023 21:24)    Urinalysis Basic - ( 12 Nov 2023 08:33 )    Color: x / Appearance: x / SG: x / pH: x  Gluc: 126 mg/dL / Ketone: x  / Bili: x / Urobili: x   Blood: x / Protein: x / Nitrite: x   Leuk Esterase: x / RBC: x / WBC x   Sq Epi: x / Non Sq Epi: x / Bacteria: x      LIVER FUNCTIONS - ( 12 Nov 2023 08:33 )  Alb: 2.8 g/dL / Pro: 7.1 g/dL / ALK PHOS: 60 U/L / ALT: 37 U/L / AST: 70 U/L / GGT: x               RADIOLOGY:    Imaging Personally visualized and Reviewed:  [y ] YES  [ ] NO      MEDS:  acetaminophen     Tablet .. 650 milliGRAM(s) Oral every 6 hours PRN  acetylcysteine 10%  Inhalation 4 milliLiter(s) Inhalation two times a day  albuterol    90 MICROgram(s) HFA Inhaler 1 Puff(s) Inhalation every 4 hours PRN  albuterol/ipratropium for Nebulization 3 milliLiter(s) Nebulizer every 6 hours  albuterol/ipratropium for Nebulization 3 milliLiter(s) Nebulizer every 4 hours  fentaNYL   Patch  25 MICROgram(s)/Hr 1 Patch Transdermal every 72 hours  folic acid 1 milliGRAM(s) Oral daily  guaiFENesin  milliGRAM(s) Oral every 12 hours  heparin   Injectable 5000 Unit(s) SubCutaneous every 12 hours  lactated ringers. 1000 milliLiter(s) IV Continuous <Continuous>  melatonin 3 milliGRAM(s) Oral at bedtime PRN  methocarbamol 500 milliGRAM(s) Oral three times a day  methylPREDNISolone sodium succinate Injectable 40 milliGRAM(s) IV Push every 12 hours  morphine  - Injectable 4 milliGRAM(s) IV Push every 6 hours PRN  ondansetron Injectable 4 milliGRAM(s) IV Push every 8 hours PRN  pantoprazole   Suspension 40 milliGRAM(s) Oral two times a day  piperacillin/tazobactam IVPB.. 2.25 Gram(s) IV Intermittent every 6 hours  polyethylene glycol 3350 17 Gram(s) Oral daily  senna 2 Tablet(s) Oral at bedtime  sodium chloride 3%  Inhalation 4 milliLiter(s) Inhalation every 12 hours

## 2023-11-12 NOTE — RAPID RESPONSE TEAM SUMMARY - NSSITUATIONBACKGROUNDRRT_GEN_ALL_CORE
Patient is a 59y/o male with PMH of multiple myeloma not on treatment admitted for post PEG placement monitoring. Patient had 2 prior episodes of hypoxia, and has been accepted to SDU with pulm recommending BIPAP for now. Per RT, patient was started on bipap but was discontinued because patient endorsed feeling of dyspnea and strong urge to cough. Patient was to be placed on HFNC instead. While RT was preparing HFNC, patient desatted to the 70s and rapid was called by nurse. Breathing improved and O2 sat was back to 98% on rebreather 6L. Patient was afebrile, denied chest pain or other symptoms.    ABG ordered. Requested mucomyst and chest PT to be done before BIPAP or HFNC Patient is a 57y/o male with PMH of multiple myeloma not on treatment admitted for post PEG placement monitoring. Patient had 2 prior episodes of hypoxia, and has been accepted to SDU with pulm recommending BIPAP for now. Per RT, patient was started on bipap but was discontinued because patient endorsed feeling of dyspnea and strong urge to cough. Patient was to be placed on HFNC instead. While RT was preparing HFNC, patient desatted to the 70s and rapid was called by nurse. Breathing improved and O2 sat was back to 98% on rebreather 6L. Patient was afebrile, denied chest pain or other symptoms.    ABG ordered and TTE. Requested mucomyst and chest PT to be done before BIPAP or HFNC. Duoneb ordered.

## 2023-11-12 NOTE — RAPID RESPONSE TEAM SUMMARY - NSOTHERINTERVENTIONSRRT_GEN_ALL_CORE
Attending attestation:   Agree with above.     Focused PE: +S1/S2, no M/R/G, no pitting edema, decreased left sided breast sounds    Impression:   - acute hypoxemic respiratory failure   - left sided pneumonia and mucus plug with possible parapneumonic effusion   - NSTEMI likely type 2 MI     Plan:  - patient accepted for upgrade to stepdown  - chest PT, mucomyst, hypertonic saline nebs, BIPAP  - switch abx to zosyn   - can empirically give solumedrol but no hx of COPD? doubt COPD exacerbation   - cardio consult; critical care eval in SDU   - consider ID consult

## 2023-11-12 NOTE — RAPID RESPONSE TEAM SUMMARY - NSSITUATIONBACKGROUNDRRT_GEN_ALL_CORE
Patient is a 57y/o male with PMH of multiple myeloma not on chemo admitted for post PEG placement monitoring. At 1800 11/11, patient desaturated and complained of SOB. D-dimer, trops, ECG and CXR was ordered. D-dimer was negative adjusted for age, trop was elevated at 1.02, CXR showed mild left lung effusion, ECG showed AVR st elevation and diffuse st depression. Cardiology recommended trending trops and serial ECG. At 0340 11/12, patient was found laboring to breath and desatted to 80s. Patient denied chest pain. Patient was put on rebreather 6L and CXR, ECG, ABG was ordered. ABG showed elevated lactate and acidotic picture. ABX for pneumonia was switched to zosyn. ECG showed similar findings and cardiology was contacted. Per cardio fellow, unlikely to be cardiac. CXR showed left lung interval opacification. Lung sounds diminished on PE. Chest PT ordered. Patient denied chest pain and breathing improved.

## 2023-11-13 LAB
PROCALCITONIN SERPL-MCNC: 1.43 NG/ML — HIGH (ref 0.02–0.1)
PROCALCITONIN SERPL-MCNC: 1.43 NG/ML — HIGH (ref 0.02–0.1)

## 2023-11-17 LAB
CULTURE RESULTS: SIGNIFICANT CHANGE UP
CULTURE RESULTS: SIGNIFICANT CHANGE UP
SPECIMEN SOURCE: SIGNIFICANT CHANGE UP
SPECIMEN SOURCE: SIGNIFICANT CHANGE UP

## 2024-04-08 NOTE — ED PROVIDER NOTE - CLINICAL SUMMARY MEDICAL DECISION MAKING FREE TEXT BOX
DME for sleep apnea machine and supplies re-ordered as PCP provider.  Insurance doesn't accept orders from a specialist.  
54 y/o M PMH multiple myeloma, followed by Dr. Valadez presents to the ED for 2 units. Pt’s hemoglobin was found to be 7.5 so sent to the ED. Pt only complains of dyspnea on exertion and notes that this is a normal symptom for when he has low hemoglobin. VS reviewed. Labs obtained. Patient was consented for blood. Blood products ordered, patient found to have antibioties and was told it would take longer. Patient stated he needed to leave by 6pm for an appointment in [place] at 7 and refused to wait for the blood. Patient requested to leave ama The risks, benefits and alternatives (including the possibility of worsening of disease, pain, permanent disability, and/or death) with the patient.  The patient voices understanding of these risks, benefits, and alternatives and still wishes to sign out against medical advice.  The patient is awake, alert, oriented x 3 and has demonstrated capacity to refuse/direct care.  I have advised the patient that they can and should return immediately should they develop any worse/different/additional symptoms, or if they change their mind and want to continue their care. Patient has full capacity and has verbalized understanding.  Given detailed returned precautions.

## 2024-04-09 NOTE — ED ADULT NURSE NOTE - CAS DISCH TRANSFER METHOD
Alert-The patient is alert, awake and responds to voice. The patient is oriented to time, place, and person. The triage nurse is able to obtain subjective information.
Private car
Stable

## 2024-06-24 NOTE — ED ADULT TRIAGE NOTE - ESI TRIAGE ACUITY LEVEL, MLM
Health Maintenance       DTaP/Tdap/Td Vaccine (2 - Td or Tdap)  Overdue since 1/20/2021    Diabetes Eye Exam (Yearly)  Overdue since 5/12/2024           Following review of the above:  Health maintenance topics up to date.    Note: Refer to final orders and clinician documentation.       3
